# Patient Record
Sex: FEMALE | Race: WHITE | Employment: OTHER | URBAN - METROPOLITAN AREA
[De-identification: names, ages, dates, MRNs, and addresses within clinical notes are randomized per-mention and may not be internally consistent; named-entity substitution may affect disease eponyms.]

---

## 2017-04-29 ENCOUNTER — HOSPITAL ENCOUNTER (EMERGENCY)
Facility: CLINIC | Age: 82
Discharge: HOME | End: 2017-04-29
Attending: FAMILY MEDICINE | Admitting: FAMILY MEDICINE
Payer: MEDICARE

## 2017-04-29 VITALS
WEIGHT: 126 LBS | RESPIRATION RATE: 22 BRPM | HEART RATE: 81 BPM | TEMPERATURE: 98.2 F | SYSTOLIC BLOOD PRESSURE: 152 MMHG | OXYGEN SATURATION: 97 % | BODY MASS INDEX: 24.61 KG/M2 | DIASTOLIC BLOOD PRESSURE: 58 MMHG

## 2017-04-29 DIAGNOSIS — R00.2 PALPITATIONS: ICD-10-CM

## 2017-04-29 LAB
ALBUMIN SERPL-MCNC: 3.6 G/DL (ref 3.4–5)
ALP SERPL-CCNC: 113 U/L (ref 40–150)
ALT SERPL W P-5'-P-CCNC: 24 U/L (ref 0–50)
ANION GAP SERPL CALCULATED.3IONS-SCNC: 7 MMOL/L (ref 3–14)
AST SERPL W P-5'-P-CCNC: 22 U/L (ref 0–45)
BASOPHILS # BLD AUTO: 0 10E9/L (ref 0–0.2)
BASOPHILS NFR BLD AUTO: 0.3 %
BILIRUB SERPL-MCNC: 0.2 MG/DL (ref 0.2–1.3)
BUN SERPL-MCNC: 30 MG/DL (ref 7–30)
CALCIUM SERPL-MCNC: 9.2 MG/DL (ref 8.5–10.1)
CHLORIDE SERPL-SCNC: 96 MMOL/L (ref 94–109)
CO2 SERPL-SCNC: 36 MMOL/L (ref 20–32)
CREAT SERPL-MCNC: 0.88 MG/DL (ref 0.52–1.04)
DIFFERENTIAL METHOD BLD: ABNORMAL
EOSINOPHIL # BLD AUTO: 0.2 10E9/L (ref 0–0.7)
EOSINOPHIL NFR BLD AUTO: 2.3 %
ERYTHROCYTE [DISTWIDTH] IN BLOOD BY AUTOMATED COUNT: 13.9 % (ref 10–15)
GFR SERPL CREATININE-BSD FRML MDRD: 62 ML/MIN/1.7M2
GLUCOSE SERPL-MCNC: 93 MG/DL (ref 70–99)
HCT VFR BLD AUTO: 39.3 % (ref 35–47)
HGB BLD-MCNC: 11.6 G/DL (ref 11.7–15.7)
IMM GRANULOCYTES # BLD: 0 10E9/L (ref 0–0.4)
IMM GRANULOCYTES NFR BLD: 0.2 %
LYMPHOCYTES # BLD AUTO: 1.7 10E9/L (ref 0.8–5.3)
LYMPHOCYTES NFR BLD AUTO: 18.5 %
MCH RBC QN AUTO: 28.3 PG (ref 26.5–33)
MCHC RBC AUTO-ENTMCNC: 29.5 G/DL (ref 31.5–36.5)
MCV RBC AUTO: 96 FL (ref 78–100)
MONOCYTES # BLD AUTO: 0.9 10E9/L (ref 0–1.3)
MONOCYTES NFR BLD AUTO: 9.7 %
NEUTROPHILS # BLD AUTO: 6.5 10E9/L (ref 1.6–8.3)
NEUTROPHILS NFR BLD AUTO: 69 %
NT-PROBNP SERPL-MCNC: 973 PG/ML (ref 0–1800)
PLATELET # BLD AUTO: 263 10E9/L (ref 150–450)
POTASSIUM SERPL-SCNC: 3.8 MMOL/L (ref 3.4–5.3)
PROT SERPL-MCNC: 7.6 G/DL (ref 6.8–8.8)
RBC # BLD AUTO: 4.1 10E12/L (ref 3.8–5.2)
SODIUM SERPL-SCNC: 139 MMOL/L (ref 133–144)
TROPONIN I SERPL-MCNC: NORMAL UG/L (ref 0–0.04)
WBC # BLD AUTO: 9.4 10E9/L (ref 4–11)

## 2017-04-29 PROCEDURE — 84484 ASSAY OF TROPONIN QUANT: CPT | Performed by: FAMILY MEDICINE

## 2017-04-29 PROCEDURE — 80053 COMPREHEN METABOLIC PANEL: CPT | Performed by: FAMILY MEDICINE

## 2017-04-29 PROCEDURE — 85025 COMPLETE CBC W/AUTO DIFF WBC: CPT | Performed by: FAMILY MEDICINE

## 2017-04-29 PROCEDURE — 93005 ELECTROCARDIOGRAM TRACING: CPT

## 2017-04-29 PROCEDURE — 93010 ELECTROCARDIOGRAM REPORT: CPT | Mod: Z6 | Performed by: FAMILY MEDICINE

## 2017-04-29 PROCEDURE — 99284 EMERGENCY DEPT VISIT MOD MDM: CPT | Mod: 25 | Performed by: FAMILY MEDICINE

## 2017-04-29 PROCEDURE — 99284 EMERGENCY DEPT VISIT MOD MDM: CPT | Mod: 25

## 2017-04-29 PROCEDURE — A9270 NON-COVERED ITEM OR SERVICE: HCPCS | Mod: GY | Performed by: FAMILY MEDICINE

## 2017-04-29 PROCEDURE — 25000132 ZZH RX MED GY IP 250 OP 250 PS 637: Mod: GY | Performed by: FAMILY MEDICINE

## 2017-04-29 PROCEDURE — 83880 ASSAY OF NATRIURETIC PEPTIDE: CPT | Performed by: FAMILY MEDICINE

## 2017-04-29 RX ORDER — ATENOLOL 25 MG/1
50 TABLET ORAL ONCE
Status: COMPLETED | OUTPATIENT
Start: 2017-04-29 | End: 2017-04-29

## 2017-04-29 RX ORDER — LIDOCAINE 40 MG/G
CREAM TOPICAL
Status: DISCONTINUED | OUTPATIENT
Start: 2017-04-29 | End: 2017-04-29 | Stop reason: HOSPADM

## 2017-04-29 RX ORDER — AMLODIPINE BESYLATE 5 MG/1
5 TABLET ORAL ONCE
Status: COMPLETED | OUTPATIENT
Start: 2017-04-29 | End: 2017-04-29

## 2017-04-29 RX ADMIN — ATENOLOL 50 MG: 25 TABLET ORAL at 18:08

## 2017-04-29 RX ADMIN — AMLODIPINE BESYLATE 5 MG: 5 TABLET ORAL at 18:09

## 2017-04-29 NOTE — DISCHARGE INSTRUCTIONS
"  * Heart Palpitations    Palpitations refers to the feeling that your heart is beating hard, fast or irregular. Some people describe it as \"pounding\" or \"skipped beats\". Palpitations may occur in persons with heart disease, but can also occur in healthy persons. Your doctor does not believe that anything dangerous is causing your symptoms at this time.  Heart-Related Causes:    Arrhythmia (a change from the heart's normal rhythm)    Disease of the heart valves  Non-Heart-Related Causes:    Certain medicines (such as asthma inhalers and decongestants)    Some herbal supplements, energy drinks and pills, and weight loss pills    Illegal stimulant drugs (such as cocaine, crank, methamphetamine, PCP)    Caffeine, alcohol and tobacco    Medical conditions such as thyroid disease, anemia, anxiety and panic disorder  Sometimes the cause cannot be found.  Home Care:  1. Avoid excess caffeine, alcohol, tobacco and any stimulant drugs.  2. Tell your doctor about any prescription or over-the-counter or herbal medicines you take.  Follow Up  with your doctor or as advised by our staff.  Get Prompt Medical Attention  if any of the following occur together with palpitations:    Weakness, dizziness, light-headed or fainting    Chest pain or shortness of breath    Rapid heart rate (over 120 beats per minute, at rest)    Palpitations that lasts over 20 minutes    Weakness of an arm or leg or one side of the face    Difficulty with speech or vision    3419-8376 [employer]. [address] [place] [address]. All rights reserved. This information is not intended as a substitute for professional medical care. Always follow your healthcare professional's instructions.        "

## 2017-04-29 NOTE — ED NOTES
Pt states that she felt odd today.  Checked her bp which was good, but HR:  40 then checked on an oximeter and it was 80.  This is the first time this has ever happened to her.  Denies any chest pain, n/v/Sob.

## 2017-04-29 NOTE — ED AVS SNAPSHOT
" [facility] Emergency Department    [address] [place] [address]    Phone:  [phone]    Fax:  [phone]                                       Janice K Goodell   MRN: [id number]    Department:  [facility] Emergency Department   Date of Visit:  4/29/2017           Patient Information     Date Of Birth          1935        Your diagnoses for this visit were:     Palpitations        You were seen by Negro Barroso MD.      Follow-up Information     Follow up with Michael Henao Schedule an appointment as soon as possible for a visit in 3 days.    Specialty:  Family Practice    Why:  If not improving.    Contact information:    [facility]  [id number]   [place] [address]  [phone]          Discharge Instructions         * Heart Palpitations    Palpitations refers to the feeling that your heart is beating hard, fast or irregular. Some people describe it as \"pounding\" or \"skipped beats\". Palpitations may occur in persons with heart disease, but can also occur in healthy persons. Your doctor does not believe that anything dangerous is causing your symptoms at this time.  Heart-Related Causes:    Arrhythmia (a change from the heart's normal rhythm)    Disease of the heart valves  Non-Heart-Related Causes:    Certain medicines (such as asthma inhalers and decongestants)    Some herbal supplements, energy drinks and pills, and weight loss pills    Illegal stimulant drugs (such as cocaine, crank, methamphetamine, PCP)    Caffeine, alcohol and tobacco    Medical conditions such as thyroid disease, anemia, anxiety and panic disorder  Sometimes the cause cannot be found.  Home Care:  1. Avoid excess caffeine, alcohol, tobacco and any stimulant drugs.  2. Tell your doctor about any prescription or over-the-counter or herbal medicines you take.  Follow Up  with your doctor or as advised by our staff.  Get Prompt Medical Attention  if any of the following " occur together with palpitations:    Weakness, dizziness, light-headed or fainting    Chest pain or shortness of breath    Rapid heart rate (over 120 beats per minute, at rest)    Palpitations that lasts over 20 minutes    Weakness of an arm or leg or one side of the face    Difficulty with speech or vision    9211-0167 [employer]. [address] [place] [address]. All rights reserved. This information is not intended as a substitute for professional medical care. Always follow your healthcare professional's instructions.          24 Hour Appointment Hotline       To make an appointment at any [facility], call [phone] ([phone]). If you don't have a family doctor or clinic, we will help you find one. [facility] clinics are conveniently located to serve the needs of you and your family.             Review of your medicines      Our records show that you are taking the medicines listed below. If these are incorrect, please call your family doctor or clinic.        Dose / Directions Last dose taken    ADVAIR DISKUS 250-50 MCG/DOSE diskus inhaler   Generic drug:  fluticasone-salmeterol        bid   Refills:  0        ALBUTEROL IN        as needed   Refills:  0        aspirin 81 MG chewable tablet   Dose:  81 mg        Take 81 mg by mouth daily.   Refills:  0        atenolol 50 MG tablet   Commonly known as:  TENORMIN        1 TABLET DAILY   Refills:  0        BACTROBAN 2 % ointment   Generic drug:  mupirocin        in nose prn   Refills:  0        cetirizine 10 MG tablet   Commonly known as:  zyrTEC   Dose:  10 mg   Quantity:  20 tablet        Take 1 tablet by mouth 2 times daily as needed (itch, swelling of lips).   Refills:  0        COLACE PO        Take  by mouth.   Refills:  0        ferrous sulfate 325 (65 FE) MG tablet   Commonly known as:  IRON        Take  by mouth daily (with breakfast).   Refills:  0        FLONASE 50 MCG/ACT spray   Generic drug:  fluticasone        2  "puffs daily   Refills:  0        INCRUSE ELLIPTA 62.5 MCG/INH oral inhaler   Dose:  1 puff   Generic drug:  umeclidinium        Inhale 1 puff into the lungs daily   Refills:  0        LASIX 20 MG tablet   Generic drug:  furosemide        2x/day   Refills:  0        MULTIVITAMIN TABS   OR        1 tablet daily   Refills:  0        NORVASC 5 MG tablet   Generic drug:  amLODIPine        2x daily   Refills:  0        POTASSIUM CHLORIDE        Refills:  0        PRESERVISION/LUTEIN Caps        Take  by mouth.   Refills:  0        PRILOSEC PO   Dose:  20 mg        Take 20 mg by mouth.   Refills:  0        TYLENOL 325 MG tablet   Generic drug:  acetaminophen        as needed   Refills:  0                Procedures and tests performed during your visit     Procedure/Test Number of Times Performed    CBC with platelets differential 1    Cardiac Continuous Monitoring 1    Comprehensive metabolic panel 1    EKG 12-lead, tracing only 2    Nt probnp inpatient (BNP) 1    Orthostatic blood pressure and pulse 1    Peripheral IV catheter 1    Troponin I 1      Orders Needing Specimen Collection     None      Pending Results     No orders found from 4/27/2017 to 4/30/2017.            Pending Culture Results     No orders found from 4/27/2017 to 4/30/2017.            Thank you for choosing [facility]       Thank you for choosing [facility] for your care. Our goal is always to provide you with excellent care. Hearing back from our patients is one way we can continue to improve our services. Please take a few minutes to complete the written survey that you may receive in the mail after you visit with us. Thank you!        [employer] Information     [employer] lets you send messages to your doctor, view your test results, renew your prescriptions, schedule appointments and more. To sign up, go to [email or web address] . Click on \"Log in\" on the left side of the screen, which will take you to the Welcome page. Then click on \"Sign up Now\" on the " right side of the page.     You will be asked to enter the access code listed below, as well as some personal information. Please follow the directions to create your username and password.     Your access code is: [email or web address]  Expires: 2017  7:01 PM     Your access code will  in 90 days. If you need help or a new code, please call your [facility] clinic or [phone].        Care EveryWhere ID     This is your Care EveryWhere ID. This could be used by other organizations to access your [facility] medical records  [id number]        After Visit Summary       This is your record. Keep this with you and show to your community pharmacist(s) and doctor(s) at your next visit.

## 2017-04-29 NOTE — ED PROVIDER NOTES
History     Chief Complaint   Patient presents with     Irregular Heart Beat     HPI  Janice K Goodell is a 81 year old female who presents with concerns of an irregular heartbeat.  Patient states that she was feeling off this afternoon.  She has high blood pressure so she went to check her blood pressure when she noticed that her pulse was erratic.  It was jumping between the 80s and then down into the 40s.  Patient denied in the chest pain, lightheadedness or dizziness.  Patient does have long-standing COPD which she is on oxygen for but denies any worsening shortness of breath currently.  She denies any nausea vomiting or diarrhea.  She denies any back pain or belly pain.  She states she does drink a lot of coffee daily but is not drunk anymore than she normally does.  She states she has been eating and drinking normally.    I have reviewed the Medications, Allergies, Past Medical and Surgical History, and Social History in the Epic system.    Review of Systems   All other systems reviewed and are negative.      Physical Exam   BP: (!) 188/92  Pulse: 81  Heart Rate: 46  Temp: 98  F (36.7  C)  Resp: 18  Weight: 57.2 kg (126 lb 3.2 oz)  SpO2: 96 %  Physical Exam   Constitutional: She appears well-developed and well-nourished. No distress.   HENT:   Head: Normocephalic and atraumatic.   Mouth/Throat: Oropharynx is clear and moist. No oropharyngeal exudate.   Eyes: Conjunctivae are normal.   Neck: Normal range of motion. Neck supple.   Cardiovascular: Normal rate, regular rhythm, normal heart sounds and intact distal pulses.  Exam reveals no gallop and no friction rub.    No murmur heard.  Pulmonary/Chest: Effort normal and breath sounds normal. No respiratory distress. She has no wheezes. She has no rales.   Abdominal: Soft. Bowel sounds are normal. She exhibits no distension. There is no tenderness. There is no rebound and no guarding.   Skin: She is not diaphoretic.   Nursing note and vitals reviewed.      ED  Course     ED Course     Procedures              EKG Interpretation:      Interpreted by Negro Barroso  Time reviewed: now   Symptoms at time of EKG: now   Rhythm: normal sinus   Rate: normal  Axis: NORMAL  Ectopy: some PACs noted  Conduction: normal  ST Segments/ T Waves: No ST-T wave changes  Q Waves: none  Comparison to prior: No old EKG available    Clinical Impression: normal EKG with PACs    Results for orders placed or performed during the hospital encounter of 04/29/17   CBC with platelets differential   Result Value Ref Range    WBC 9.4 4.0 - 11.0 10e9/L    RBC Count 4.10 3.8 - 5.2 10e12/L    Hemoglobin 11.6 (L) 11.7 - 15.7 g/dL    Hematocrit 39.3 35.0 - 47.0 %    MCV 96 78 - 100 fl    MCH 28.3 26.5 - 33.0 pg    MCHC 29.5 (L) 31.5 - 36.5 g/dL    RDW 13.9 10.0 - 15.0 %    Platelet Count 263 150 - 450 10e9/L    Diff Method Automated Method     % Neutrophils 69.0 %    % Lymphocytes 18.5 %    % Monocytes 9.7 %    % Eosinophils 2.3 %    % Basophils 0.3 %    % Immature Granulocytes 0.2 %    Absolute Neutrophil 6.5 1.6 - 8.3 10e9/L    Absolute Lymphocytes 1.7 0.8 - 5.3 10e9/L    Absolute Monocytes 0.9 0.0 - 1.3 10e9/L    Absolute Eosinophils 0.2 0.0 - 0.7 10e9/L    Absolute Basophils 0.0 0.0 - 0.2 10e9/L    Abs Immature Granulocytes 0.0 0 - 0.4 10e9/L   Comprehensive metabolic panel   Result Value Ref Range    Sodium 139 133 - 144 mmol/L    Potassium 3.8 3.4 - 5.3 mmol/L    Chloride 96 94 - 109 mmol/L    Carbon Dioxide 36 (H) 20 - 32 mmol/L    Anion Gap 7 3 - 14 mmol/L    Glucose 93 70 - 99 mg/dL    Urea Nitrogen 30 7 - 30 mg/dL    Creatinine 0.88 0.52 - 1.04 mg/dL    GFR Estimate 62 >60 mL/min/1.7m2    GFR Estimate If Black 75 >60 mL/min/1.7m2    Calcium 9.2 8.5 - 10.1 mg/dL    Bilirubin Total 0.2 0.2 - 1.3 mg/dL    Albumin 3.6 3.4 - 5.0 g/dL    Protein Total 7.6 6.8 - 8.8 g/dL    Alkaline Phosphatase 113 40 - 150 U/L    ALT 24 0 - 50 U/L    AST 22 0 - 45 U/L   Troponin I   Result Value Ref Range     Troponin I ES  0.000 - 0.045 ug/L     <0.015  The 99th percentile for upper reference range is 0.045 ug/L.  Troponin values in   the range of 0.045 - 0.120 ug/L may be associated with risks of adverse   clinical events.     Nt probnp inpatient (BNP)   Result Value Ref Range    N-Terminal Pro BNP Inpatient 973 0 - 1800 pg/mL     Medications   amLODIPine (NORVASC) tablet 5 mg (5 mg Oral Given 4/29/17 1809)   atenolol (TENORMIN) tablet 50 mg (50 mg Oral Given 4/29/17 1808)     labs are reviewed and more remarkable.  Patient was monitored on the cardiac monitor for more than an 2 hours and had no signs of significant arrhythmias.  I think some of her symptoms could be related to her PACs this doesn't appear to be any significant cardiac event at this point.  I recommend that she cut back on her caffeine use to see if this makes a difference.  I will have her follow-up with her doctor in the next few days for further evaluation if needed.    Assessments & Plan (with Medical Decision Making)  palpitations      I have reviewed the nursing notes.    I have reviewed the findings, diagnosis, plan and need for follow up with the patient.    Discharge Medication List as of 4/29/2017  7:01 PM          Final diagnoses:   Palpitations       4/29/2017   [facility] EMERGENCY DEPARTMENT     Negro Barroso MD  04/30/17 5430

## 2017-04-29 NOTE — ED AVS SNAPSHOT
[facility] Emergency Department    [address] [place] [address]    Phone:  [phone]    Fax:  [phone]                                       Janice K Goodell   MRN: [id number]    Department:  [facility] Emergency Department   Date of Visit:  4/29/2017           After Visit Summary Signature Page     I have received my discharge instructions, and my questions have been answered. I have discussed any challenges I see with this plan with the nurse or doctor.    ..........................................................................................................................................  Patient/Patient Representative Signature      ..........................................................................................................................................  Patient Representative Print Name and Relationship to Patient    ..................................................               ................................................  Date                                            Time    ..........................................................................................................................................  Reviewed by Signature/Title    ...................................................              ..............................................  Date                                                            Time

## 2017-05-16 ENCOUNTER — HOSPITAL ENCOUNTER (OUTPATIENT)
Dept: CT IMAGING | Facility: CLINIC | Age: 82
Discharge: HOME | End: 2017-05-16
Attending: FAMILY MEDICINE | Admitting: FAMILY MEDICINE
Payer: MEDICARE

## 2017-05-16 DIAGNOSIS — Z85.118 PERSONAL HISTORY OF MALIGNANT NEOPLASM OF BRONCHUS AND LUNG: ICD-10-CM

## 2017-05-16 PROCEDURE — 71260 CT THORAX DX C+: CPT

## 2017-05-16 PROCEDURE — 25000125 ZZHC RX 250: Performed by: RADIOLOGY

## 2017-05-16 PROCEDURE — 25000128 H RX IP 250 OP 636: Performed by: RADIOLOGY

## 2017-05-16 RX ORDER — IOPAMIDOL 755 MG/ML
500 INJECTION, SOLUTION INTRAVASCULAR ONCE
Status: COMPLETED | OUTPATIENT
Start: 2017-05-16 | End: 2017-05-16

## 2017-05-16 RX ADMIN — SODIUM CHLORIDE 75 ML: 9 INJECTION, SOLUTION INTRAVENOUS at 11:33

## 2017-05-16 RX ADMIN — IOPAMIDOL 75 ML: 755 INJECTION, SOLUTION INTRAVENOUS at 11:33

## 2017-11-08 ENCOUNTER — HOSPITAL ENCOUNTER (OUTPATIENT)
Dept: CT IMAGING | Facility: CLINIC | Age: 82
Discharge: HOME | End: 2017-11-08
Attending: FAMILY MEDICINE | Admitting: FAMILY MEDICINE
Payer: MEDICARE

## 2017-11-08 DIAGNOSIS — R91.1 LUNG NODULE: ICD-10-CM

## 2017-11-08 PROCEDURE — 71250 CT THORAX DX C-: CPT

## 2018-05-15 ENCOUNTER — HOSPITAL ENCOUNTER (OUTPATIENT)
Dept: CT IMAGING | Facility: CLINIC | Age: 83
Discharge: HOME | End: 2018-05-15
Attending: FAMILY MEDICINE | Admitting: FAMILY MEDICINE
Payer: MEDICARE

## 2018-05-15 DIAGNOSIS — R91.1 LUNG NODULE: ICD-10-CM

## 2018-05-15 PROCEDURE — 71250 CT THORAX DX C-: CPT

## 2018-08-25 ENCOUNTER — HOSPITAL ENCOUNTER (EMERGENCY)
Facility: CLINIC | Age: 83
Discharge: HOME | End: 2018-08-26
Attending: PHYSICIAN ASSISTANT | Admitting: PHYSICIAN ASSISTANT
Payer: MEDICARE

## 2018-08-25 DIAGNOSIS — I48.91 ATRIAL FIBRILLATION WITH RAPID VENTRICULAR RESPONSE (H): ICD-10-CM

## 2018-08-25 LAB
BASOPHILS # BLD AUTO: 0 10E9/L (ref 0–0.2)
BASOPHILS NFR BLD AUTO: 0.4 %
DIFFERENTIAL METHOD BLD: ABNORMAL
EOSINOPHIL NFR BLD AUTO: 3 %
ERYTHROCYTE [DISTWIDTH] IN BLOOD BY AUTOMATED COUNT: 13.4 % (ref 10–15)
HCT VFR BLD AUTO: 32.5 % (ref 35–47)
HGB BLD-MCNC: 10.2 G/DL (ref 11.7–15.7)
IMM GRANULOCYTES # BLD: 0 10E9/L (ref 0–0.4)
IMM GRANULOCYTES NFR BLD: 0.3 %
LYMPHOCYTES # BLD AUTO: 1.6 10E9/L (ref 0.8–5.3)
LYMPHOCYTES NFR BLD AUTO: 14.6 %
MCH RBC QN AUTO: 29 PG (ref 26.5–33)
MCHC RBC AUTO-ENTMCNC: 31.4 G/DL (ref 31.5–36.5)
MCV RBC AUTO: 92 FL (ref 78–100)
MONOCYTES # BLD AUTO: 1 10E9/L (ref 0–1.3)
MONOCYTES NFR BLD AUTO: 8.8 %
NEUTROPHILS # BLD AUTO: 7.9 10E9/L (ref 1.6–8.3)
NEUTROPHILS NFR BLD AUTO: 72.9 %
NRBC # BLD AUTO: 0 10*3/UL
NRBC BLD AUTO-RTO: 0 /100
PLATELET # BLD AUTO: 249 10E9/L (ref 150–450)
RBC # BLD AUTO: 3.52 10E12/L (ref 3.8–5.2)
WBC # BLD AUTO: 10.8 10E9/L (ref 4–11)

## 2018-08-25 PROCEDURE — 99285 EMERGENCY DEPT VISIT HI MDM: CPT | Mod: 25 | Performed by: PHYSICIAN ASSISTANT

## 2018-08-25 PROCEDURE — 25000128 H RX IP 250 OP 636: Performed by: PHYSICIAN ASSISTANT

## 2018-08-25 PROCEDURE — 93005 ELECTROCARDIOGRAM TRACING: CPT | Performed by: PHYSICIAN ASSISTANT

## 2018-08-25 PROCEDURE — 84484 ASSAY OF TROPONIN QUANT: CPT | Performed by: PHYSICIAN ASSISTANT

## 2018-08-25 PROCEDURE — 93010 ELECTROCARDIOGRAM REPORT: CPT | Mod: Z6 | Performed by: PHYSICIAN ASSISTANT

## 2018-08-25 PROCEDURE — 83880 ASSAY OF NATRIURETIC PEPTIDE: CPT | Performed by: PHYSICIAN ASSISTANT

## 2018-08-25 PROCEDURE — 96361 HYDRATE IV INFUSION ADD-ON: CPT | Performed by: PHYSICIAN ASSISTANT

## 2018-08-25 PROCEDURE — 80053 COMPREHEN METABOLIC PANEL: CPT | Performed by: PHYSICIAN ASSISTANT

## 2018-08-25 PROCEDURE — 85025 COMPLETE CBC W/AUTO DIFF WBC: CPT | Performed by: PHYSICIAN ASSISTANT

## 2018-08-25 RX ORDER — POTASSIUM CHLORIDE 750 MG/1
10 TABLET, EXTENDED RELEASE ORAL 2 TIMES DAILY WITH MEALS
COMMUNITY
Start: 2017-10-17

## 2018-08-25 RX ORDER — ASPIRIN 81 MG/1
81 TABLET ORAL DAILY
COMMUNITY
Start: 2012-03-21 | End: 2018-08-25

## 2018-08-25 RX ORDER — DESONIDE 0.5 MG/G
CREAM TOPICAL PRN
Status: ON HOLD | COMMUNITY
Start: 2017-10-17 | End: 2019-06-26

## 2018-08-25 RX ORDER — DILTIAZEM HYDROCHLORIDE 5 MG/ML
20 INJECTION INTRAVENOUS ONCE
Status: COMPLETED | OUTPATIENT
Start: 2018-08-25 | End: 2018-08-26

## 2018-08-25 RX ORDER — FLUTICASONE PROPIONATE 50 MCG
1 SPRAY, SUSPENSION (ML) NASAL DAILY
COMMUNITY
Start: 2013-11-29

## 2018-08-25 RX ORDER — ATENOLOL 50 MG/1
50 TABLET ORAL 2 TIMES DAILY
COMMUNITY
Start: 2018-05-08 | End: 2018-09-05 | Stop reason: ALTCHOICE

## 2018-08-25 RX ORDER — FUROSEMIDE 40 MG
40 TABLET ORAL DAILY
Status: ON HOLD | COMMUNITY
Start: 2018-07-18 | End: 2019-06-26

## 2018-08-25 RX ORDER — MULTIVITAMIN
1 TABLET ORAL DAILY
COMMUNITY

## 2018-08-25 RX ORDER — TRIAMCINOLONE ACETONIDE 0.1 %
PASTE (GRAM) DENTAL 2 TIMES DAILY PRN
Status: ON HOLD | COMMUNITY
Start: 2017-10-17 | End: 2019-06-26

## 2018-08-25 RX ORDER — AMLODIPINE BESYLATE 5 MG/1
5 TABLET ORAL 2 TIMES DAILY
COMMUNITY
Start: 2018-05-08 | End: 2019-06-24

## 2018-08-25 RX ORDER — TIOTROPIUM BROMIDE 18 UG/1
18 CAPSULE ORAL; RESPIRATORY (INHALATION) DAILY
COMMUNITY
Start: 2018-07-02

## 2018-08-25 RX ORDER — LISINOPRIL 10 MG/1
20 TABLET ORAL 2 TIMES DAILY
COMMUNITY
Start: 2017-12-13

## 2018-08-25 RX ORDER — FERROUS GLUCONATE 324(38)MG
324 TABLET ORAL DAILY
COMMUNITY
Start: 2013-05-30 | End: 2019-06-24

## 2018-08-25 RX ORDER — DOCUSATE SODIUM 100 MG/1
100 CAPSULE, LIQUID FILLED ORAL DAILY
Status: ON HOLD | COMMUNITY
Start: 2012-03-30 | End: 2019-06-26

## 2018-08-25 RX ORDER — ALBUTEROL SULFATE 90 UG/1
2 AEROSOL, METERED RESPIRATORY (INHALATION) 4 TIMES DAILY PRN
COMMUNITY
Start: 2018-07-02

## 2018-08-25 RX ADMIN — SODIUM CHLORIDE 1000 ML: 9 INJECTION, SOLUTION INTRAVENOUS at 23:53

## 2018-08-25 NOTE — ED AVS SNAPSHOT
[facility] Emergency Department    [address] [place] [address]    Phone:  [phone]    Fax:  [phone]                                       Janice K Goodell   MRN: [id number]    Department:  [facility] Emergency Department   Date of Visit:  8/25/2018           After Visit Summary Signature Page     I have received my discharge instructions, and my questions have been answered. I have discussed any challenges I see with this plan with the nurse or doctor.    ..........................................................................................................................................  Patient/Patient Representative Signature      ..........................................................................................................................................  Patient Representative Print Name and Relationship to Patient    ..................................................               ................................................  Date                                            Time    ..........................................................................................................................................  Reviewed by Signature/Title    ...................................................              ..............................................  Date                                                            Time          22EPIC Rev 08/18

## 2018-08-25 NOTE — ED AVS SNAPSHOT
[facility] Emergency Department    [address]     [place] [address]    Phone:  [phone]    Fax:  [phone]                                       Janice K Goodell   MRN: [id number]    Department:  [facility] Emergency Department   Date of Visit:  8/25/2018           Patient Information     Date Of Birth          1935        Your diagnoses for this visit were:     Atrial fibrillation with rapid ventricular response (H)        You were seen by Dayna Marie PA-C.      Follow-up Information     Follow up with [facility] Emergency Department.    Specialty:  EMERGENCY MEDICINE    Why:  If symptoms worsen    Contact information:    [address] [facility]   [place] [address] [phone]    Additional information:    From [address]: Exit at [address] on south side of [place]. Turn right on [address]. Turn left at stoplight on [address]. [facility] will be in view two blocks ahead        Follow up with Michael Henao In 3 days.    Specialty:  Family Practice    Why:  For ER follow up    Contact information:    [facility]  [address] [facility] [address]  [phone]          Discharge Instructions       Fortunately it looks like your heart converted back into a normal rhythm. Continue taking your atenolol twice daily as prescribed.  This will help control your rate going forward.  We did not start any anticoagulation medication due to the bleeding risk after your liver biopsy.    Please talk to your regular doctor on Tuesday at your follow-up appointment about this ER visit.  You may benefit from wearing a heart monitor to evaluate rhythm further.  You may require anticoagulation in the future but this can be discussed at the follow-up appointment either with him or with cardiology.    In the meantime, if you develop any recurrence of or worsening symptoms, please do not hesitate to return to the emergency department  right away.    Thank you for choosing [facility] Emergency Department. It was a pleasure taking care of you today. If you have any questions, please call [phone].    Dayna Marie PA-C          What Is Atrial Flutter/Atrial Fibrillation?      The heart has its own electrical system. This system makes the signals that start each heartbeat. The heartbeat begins in 1 of the 2 upper chambers of the heart (atria). A problem can make the atria beat faster than normal. The atria may beat fast but still evenly. This problem is called atrial flutter. If the atria beat very fast and also unevenly, it is called atrial fibrillation (AFib).  Causes of Atrial Flutter and Atrial Fibrillation  Causes of these problems can include:    Previous heart attack    High blood pressure    Thyroid problems  In many cases, the cause is unknown.  When the Atria Beat Too Fast  The atria may beat fast only once in a while. This is called a paroxysmal heart rhythm problem. If they beat fast all the time, it is a chronic problem.  Atrial Flutter  With atrial flutter, electrical signals travel around and around inside the atria. These circling signals make the atria beat too fast:    Atrial flutter can cause symptoms similar to AFib. It can also lead to the even faster, uneven rhythms of AFib.  Atrial Fibrillation (AFib)  With AFib, cells in the atria send extra electrical signals. These extra signals make the atria beat very fast. They also beat unevenly:    The atria beat so fast and unevenly that they may quiver instead of norma. If the atria don t contract, they don t move enough blood into the 2 lower chambers of the heart (ventricles). This can cause you to feel dizzy or weak.    Blood that doesn t keep moving can pool and form clots in the atria. These clots can move into other parts of the body and cause serious problems such as a stroke.   Symptoms of Atrial Flutter and AFib  These symptoms include the  following:    Palpitations (a fluttering, fast heartbeat)    Weakness or tiredness    Shortness of breath    Chest pain or tightness    Dizziness or lightheadedness    Fainting spells           24 Hour Appointment Hotline       To make an appointment at any [facility] clinic, call [phone]). If you don't have a family doctor or clinic, we will help you find one. [facility] clinics are conveniently located to serve the needs of you and your family.             Review of your medicines      Our records show that you are taking the medicines listed below. If these are incorrect, please call your family doctor or clinic.        Dose / Directions Last dose taken    ADVAIR DISKUS 500-50 MCG/DOSE diskus inhaler   Dose:  1 puff   Generic drug:  fluticasone-salmeterol        Inhale 1 puff into the lungs 2 times daily   Refills:  0        albuterol 108 (90 Base) MCG/ACT inhaler   Commonly known as:  PROAIR HFA/PROVENTIL HFA/VENTOLIN HFA   Dose:  2 puff        Inhale 2 puffs into the lungs 4 times daily as needed   Refills:  0        amLODIPine 5 MG tablet   Commonly known as:  NORVASC   Dose:  5 mg        Take 5 mg by mouth 2 times daily   Refills:  0        aspirin 81 MG chewable tablet   Dose:  81 mg        Take 81 mg by mouth daily.   Refills:  0        atenolol 50 MG tablet   Commonly known as:  TENORMIN   Dose:  50 mg        Take 50 mg by mouth 2 times daily   Refills:  0        BACTROBAN 2 % ointment   Generic drug:  mupirocin        in nose prn   Refills:  0        cetirizine 10 MG tablet   Commonly known as:  zyrTEC   Dose:  10 mg   Quantity:  20 tablet        Take 1 tablet by mouth 2 times daily as needed (itch, swelling of lips).   Refills:  0        desonide 0.05 % cream   Commonly known as:  DESOWEN        Apply topically as needed   Refills:  0        ferrous gluconate 324 (38 Fe) MG tablet   Commonly known as:  FERGON   Dose:  324 mg        Take 324 mg by mouth daily   Refills:  0         fluticasone 50 MCG/ACT spray   Commonly known as:  FLONASE   Dose:  1 spray        Spray 1 spray in nostril daily   Refills:  0        furosemide 40 MG tablet   Commonly known as:  LASIX   Dose:  40 mg        Take 40 mg by mouth daily   Refills:  0        lisinopril 10 MG tablet   Commonly known as:  PRINIVIL/ZESTRIL   Dose:  10 mg        Take 10 mg by mouth 2 times daily   Refills:  0        MULTIPLE VITAMIN ESSENTIAL Tabs   Dose:  1 tablet        Take 1 tablet by mouth daily   Refills:  0        OCUVITE PRESERVISION PO   Dose:  1 tablet        Take 1 tablet by mouth 2 times daily   Refills:  0        omeprazole 20 MG CR capsule   Commonly known as:  priLOSEC   Dose:  20 mg        Take 20 mg by mouth 2 times daily   Refills:  0        potassium chloride SA 10 MEQ CR tablet   Commonly known as:  K-DUR/KLOR-CON M   Dose:  10 mEq        Take 10 mEq by mouth 2 times daily (with meals)   Refills:  0        STOOL SOFTENER 100 MG capsule   Dose:  100 mg   Generic drug:  docusate sodium        Take 100 mg by mouth daily   Refills:  0        tiotropium 18 MCG capsule   Commonly known as:  SPIRIVA   Dose:  18 mcg        Inhale 18 mcg into the lungs daily   Refills:  0        triamcinolone 0.1 % paste   Commonly known as:  KENALOG        2 times daily as needed   Refills:  0                Procedures and tests performed during your visit     Procedure/Test Number of Times Performed    CBC with platelets differential 1    Cardiac Continuous Monitoring 1    Comprehensive metabolic panel 1    EKG 12-lead, tracing only 2    Nt probnp inpatient (BNP) 1    Peripheral IV catheter 1    Troponin I 1      Orders Needing Specimen Collection     None      Pending Results     No orders found for last 3 day(s).            Pending Culture Results     No orders found for last 3 day(s).            Pending Results Instructions     If you had any lab results that were not finalized at the time of your Discharge, you can call the ED Lab Result  "RN at [phone]. You will be contacted by this team for any positive Lab results or changes in treatment. The nurses are available 7 days a week from 10A to 6:30P.  You can leave a message 24 hours per day and they will return your call.        Thank you for choosing [facility]       Thank you for choosing [facility] for your care. Our goal is always to provide you with excellent care. Hearing back from our patients is one way we can continue to improve our services. Please take a few minutes to complete the written survey that you may receive in the mail after you visit with us. Thank you!        [employer] Information     [employer] lets you send messages to your doctor, view your test results, renew your prescriptions, schedule appointments and more. To sign up, go to [email or web address] . Click on \"Log in\" on the left side of the screen, which will take you to the Welcome page. Then click on \"Sign up Now\" on the right side of the page.     You will be asked to enter the access code listed below, as well as some personal information. Please follow the directions to create your username and password.     Your access code is: [email or web address]  Expires: 2018 12:56 AM     Your access code will  in 90 days. If you need help or a new code, please call your [facility] clinic or [phone].        Care EveryWhere ID     This is your Care EveryWhere ID. This could be used by other organizations to access your [facility] medical records  [id number]        Equal Access to Services     VARINDER VARGHESE : Hadii jhonathan croucho Sogaetano, waaxda luqadaha, qaybta kaalmada charito salcedo . So [facility] [phone].    ATENCIÓN: Si habla español, tiene a roldan disposición servicios gratuitos de asistencia lingüística. Llame al [phone].    We comply with applicable federal civil rights laws and [place] laws. We do not discriminate on the basis of race, color, national origin, age, disability, " sex, sexual orientation, or gender identity.            After Visit Summary       This is your record. Keep this with you and show to your community pharmacist(s) and doctor(s) at your next visit.

## 2018-08-26 VITALS
TEMPERATURE: 98 F | SYSTOLIC BLOOD PRESSURE: 134 MMHG | DIASTOLIC BLOOD PRESSURE: 60 MMHG | RESPIRATION RATE: 22 BRPM | OXYGEN SATURATION: 98 % | HEART RATE: 132 BPM

## 2018-08-26 LAB
ALBUMIN SERPL-MCNC: 3.2 G/DL (ref 3.4–5)
ALP SERPL-CCNC: 111 U/L (ref 40–150)
ALT SERPL W P-5'-P-CCNC: 29 U/L (ref 0–50)
ANION GAP SERPL CALCULATED.3IONS-SCNC: 7 MMOL/L (ref 3–14)
AST SERPL W P-5'-P-CCNC: 29 U/L (ref 0–45)
BILIRUB SERPL-MCNC: 0.2 MG/DL (ref 0.2–1.3)
BUN SERPL-MCNC: 22 MG/DL (ref 7–30)
CALCIUM SERPL-MCNC: 8.5 MG/DL (ref 8.5–10.1)
CHLORIDE SERPL-SCNC: 97 MMOL/L (ref 94–109)
CO2 SERPL-SCNC: 32 MMOL/L (ref 20–32)
CREAT SERPL-MCNC: 1.07 MG/DL (ref 0.52–1.04)
GFR SERPL CREATININE-BSD FRML MDRD: 49 ML/MIN/1.7M2
GLUCOSE SERPL-MCNC: 134 MG/DL (ref 70–99)
NT-PROBNP SERPL-MCNC: 1998 PG/ML (ref 0–1800)
POTASSIUM SERPL-SCNC: 3.6 MMOL/L (ref 3.4–5.3)
PROT SERPL-MCNC: 7.1 G/DL (ref 6.8–8.8)
SODIUM SERPL-SCNC: 136 MMOL/L (ref 133–144)
TROPONIN I SERPL-MCNC: <0.015 UG/L (ref 0–0.04)

## 2018-08-26 PROCEDURE — 96374 THER/PROPH/DIAG INJ IV PUSH: CPT | Performed by: PHYSICIAN ASSISTANT

## 2018-08-26 PROCEDURE — 93010 ELECTROCARDIOGRAM REPORT: CPT | Mod: 76 | Performed by: PHYSICIAN ASSISTANT

## 2018-08-26 PROCEDURE — 25000125 ZZHC RX 250: Performed by: PHYSICIAN ASSISTANT

## 2018-08-26 RX ADMIN — DILTIAZEM HYDROCHLORIDE 20 MG: 5 INJECTION INTRAVENOUS at 00:07

## 2018-08-26 NOTE — DISCHARGE INSTRUCTIONS
Fortunately it looks like your heart converted back into a normal rhythm. Continue taking your atenolol twice daily as prescribed.  This will help control your rate going forward.  We did not start any anticoagulation medication due to the bleeding risk after your liver biopsy.    Please talk to your regular doctor on Tuesday at your follow-up appointment about this ER visit.  You may benefit from wearing a heart monitor to evaluate rhythm further.  You may require anticoagulation in the future but this can be discussed at the follow-up appointment either with him or with cardiology.    In the meantime, if you develop any recurrence of or worsening symptoms, please do not hesitate to return to the emergency department right away.    Thank you for choosing [facility] Emergency Department. It was a pleasure taking care of you today. If you have any questions, please call [phone].    Dayna Marie PA-C          What Is Atrial Flutter/Atrial Fibrillation?      The heart has its own electrical system. This system makes the signals that start each heartbeat. The heartbeat begins in 1 of the 2 upper chambers of the heart (atria). A problem can make the atria beat faster than normal. The atria may beat fast but still evenly. This problem is called atrial flutter. If the atria beat very fast and also unevenly, it is called atrial fibrillation (AFib).  Causes of Atrial Flutter and Atrial Fibrillation  Causes of these problems can include:    Previous heart attack    High blood pressure    Thyroid problems  In many cases, the cause is unknown.  When the Atria Beat Too Fast  The atria may beat fast only once in a while. This is called a paroxysmal heart rhythm problem. If they beat fast all the time, it is a chronic problem.  Atrial Flutter  With atrial flutter, electrical signals travel around and around inside the atria. These circling signals make the atria beat too fast:    Atrial flutter can cause  symptoms similar to AFib. It can also lead to the even faster, uneven rhythms of AFib.  Atrial Fibrillation (AFib)  With AFib, cells in the atria send extra electrical signals. These extra signals make the atria beat very fast. They also beat unevenly:    The atria beat so fast and unevenly that they may quiver instead of norma. If the atria don t contract, they don t move enough blood into the 2 lower chambers of the heart (ventricles). This can cause you to feel dizzy or weak.    Blood that doesn t keep moving can pool and form clots in the atria. These clots can move into other parts of the body and cause serious problems such as a stroke.   Symptoms of Atrial Flutter and AFib  These symptoms include the following:    Palpitations (a fluttering, fast heartbeat)    Weakness or tiredness    Shortness of breath    Chest pain or tightness    Dizziness or lightheadedness    Fainting spells

## 2018-08-26 NOTE — ED TRIAGE NOTES
Pt with multiple issues. Had a recent liver BX. BP has been up and down. Heart rate has been going up. Feels tired. Had a recent brain MRI. Was told recently her cancer is back. Lack of energy. Chronic pain in L side.

## 2018-08-26 NOTE — ED PROVIDER NOTES
History     Chief Complaint   Patient presents with     Generalized Weakness     HPI  Janice K Goodell is a 82 year old female who presents to the emergency department with palpitations and weakness the patient reports that yesterday she was feeling somewhat weaker than normal.  Today she was feeling very weak and tired as well.  This afternoon she developed a pounding in her lower chest that she has never experienced before and has been even more weak, lightheaded, and fatigued since then.  She notes some fluttering in her chest still and feels like her shortness of breath is worse than usual.  She is on oxygen for COPD at 3 L/min.  Her friend who is a nurse came over and they were checking her blood pressure which was fluctuating from the 140s systolic down to the 80s systolic and her heart rate was in the 100-140s range.  Her friend said that she probably should get checked out due to her abnormal vital signs.  Patient reports she saw a cardiologist several months ago and had stress testing completed which was normal.  Patient denies any chest pain with these palpitations.  She denies any abdominal pain, nausea or vomiting.  Denies urinary symptoms.  She notes that she recently found out she had lung cancer again and had a liver biopsy done in the last week as well.        Problem List:    Patient Active Problem List    Diagnosis Date Noted     community acquired pneumonia 09/22/2012     Priority: High     Anemia 09/23/2012     Priority: Medium     COPD (chronic obstructive pulmonary disease) (H) 09/22/2012     Priority: Medium     CKD (chronic kidney disease) stage 3, GFR 30-59 ml/min 09/22/2012     Priority: Medium     Hypokalemia 09/22/2012     Priority: Medium     HTN (hypertension) 09/22/2012     Priority: Medium     Personal history of Lung cancer - right lobectomy 2006, chemo 2007 09/22/2012     Priority: Low        Past Medical History:    Past Medical History:   Diagnosis Date     COPD (chronic  obstructive pulmonary disease) (H)      Heart valve disorder      Hypertension      Macular degeneration      Malignant neoplasm (H) 2006     Plantar fasciitis        Past Surgical History:    Past Surgical History:   Procedure Laterality Date     APPENDECTOMY       BIOPSY  2006    Lung     CATARACT IOL, RT/LT  6/26/2008    Right eye     CHOLECYSTECTOMY       COLONOSCOPY       Colorectal surgery       ENT SURGERY      Tympanoplasty     HC REMV CATARACT EXTRACAP,INSERT LENS  04/23/09    Left eye     HERNIA REPAIR      Incarcerated, 2 surgeries     LASER YAG CAPSULOTOMY  7/24/2014    Procedure: LASER YAG CAPSULOTOMY;  Surgeon: Wes Mcbride MD;  Location: [facility] OR     Right lung - lobectomy  2006       Family History:    No family history on file.    Social History:  Marital Status:   [5]  Social History   Substance Use Topics     Smoking status: Former Smoker     Quit date: 10/12/2001     Smokeless tobacco: Not on file     Alcohol use No        Medications:      albuterol (PROAIR HFA/PROVENTIL HFA/VENTOLIN HFA) 108 (90 Base) MCG/ACT inhaler   amLODIPine (NORVASC) 5 MG tablet   aspirin 81 MG chewable tablet   atenolol (TENORMIN) 50 MG tablet   BACTROBAN 2 % EX OINT   cetirizine (ZYRTEC) 10 MG tablet   desonide (DESOWEN) 0.05 % cream   docusate sodium (STOOL SOFTENER) 100 MG capsule   ferrous gluconate (FERGON) 324 (38 Fe) MG tablet   fluticasone (FLONASE) 50 MCG/ACT spray   fluticasone-salmeterol (ADVAIR DISKUS) 500-50 MCG/DOSE diskus inhaler   furosemide (LASIX) 40 MG tablet   lisinopril (PRINIVIL/ZESTRIL) 10 MG tablet   MULTIPLE VITAMIN ESSENTIAL TABS   Multiple Vitamins-Minerals (OCUVITE PRESERVISION PO)   omeprazole (PRILOSEC) 20 MG CR capsule   potassium chloride SA (K-DUR/KLOR-CON M) 10 MEQ CR tablet   tiotropium (SPIRIVA) 18 MCG capsule   triamcinolone (KENALOG) 0.1 % paste         Review of Systems   All other systems reviewed and are negative.      Physical Exam   BP: (!) 144/100  Pulse:  132  Heart Rate: 105  Temp: 98  F (36.7  C)  Resp: 22  SpO2: 97 %      Physical Exam   Constitutional: She is oriented to person, place, and time. No distress.   Chronically ill-appearing female, appears frail.  Wearing O2 via nasal cannula.   HENT:   Head: Normocephalic and atraumatic.   Eyes: Conjunctivae are normal.   Neck: Neck supple.   Cardiovascular: Normal heart sounds.  An irregular rhythm present. Tachycardia present.    Pulmonary/Chest: Effort normal. No respiratory distress. She has no wheezes.   Slightly diminished breath sounds in bases.   Abdominal: Soft. She exhibits no distension. There is no tenderness. There is no rebound.   Musculoskeletal: She exhibits edema (trace BLE). She exhibits no tenderness (in BLE).   Neurological: She is alert and oriented to person, place, and time.   Skin: Skin is warm and dry. She is not diaphoretic.   Psychiatric: She has a normal mood and affect.       ED Course     ED Course     Procedures         EKG Interpretation:      Interpreted by Dayna Marie  Time reviewed: 2355  Symptoms at time of EKG: weakness, palpitations   Rhythm: atrial fibrillation - rapid  Rate: 100-110  Axis: Left Axis Deviation  Ectopy: none  Conduction: normal  ST Segments/ T Waves: No acute ischemic changes  Q Waves: none  Comparison to prior: now with new onset a-fib with RVR    Clinical Impression: atrial fibrillation (new onset)             EKG Interpretation:      Interpreted by Dayna Marie  Time reviewed:0020   Symptoms at time of EKG: after diltiazem administration feeling improved  Rhythm: Normal sinus   Rate: Normal  Axis: Left Axis Deviation  Ectopy: None  Conduction: Normal  ST Segments/ T Waves: No acute ischemic changes  Q Waves: None  Comparison to prior: converted from a-fib to sinus rhythm    Clinical Impression: normal sinus rhythm        Critical Care time:  none      Results for orders placed or performed during the hospital encounter of 08/25/18 (from the past  24 hour(s))   CBC with platelets differential   Result Value Ref Range    WBC 10.8 4.0 - 11.0 10e9/L    RBC Count 3.52 (L) 3.8 - 5.2 10e12/L    Hemoglobin 10.2 (L) 11.7 - 15.7 g/dL    Hematocrit 32.5 (L) 35.0 - 47.0 %    MCV 92 78 - 100 fl    MCH 29.0 26.5 - 33.0 pg    MCHC 31.4 (L) 31.5 - 36.5 g/dL    RDW 13.4 10.0 - 15.0 %    Platelet Count 249 150 - 450 10e9/L    Diff Method Automated Method     % Neutrophils 72.9 %    % Lymphocytes 14.6 %    % Monocytes 8.8 %    % Eosinophils 3.0 %    % Basophils 0.4 %    % Immature Granulocytes 0.3 %    Nucleated RBCs 0 0 /100    Absolute Neutrophil 7.9 1.6 - 8.3 10e9/L    Absolute Lymphocytes 1.6 0.8 - 5.3 10e9/L    Absolute Monocytes 1.0 0.0 - 1.3 10e9/L    Absolute Basophils 0.0 0.0 - 0.2 10e9/L    Abs Immature Granulocytes 0.0 0 - 0.4 10e9/L    Absolute Nucleated RBC 0.0    Comprehensive metabolic panel   Result Value Ref Range    Sodium 136 133 - 144 mmol/L    Potassium 3.6 3.4 - 5.3 mmol/L    Chloride 97 94 - 109 mmol/L    Carbon Dioxide 32 20 - 32 mmol/L    Anion Gap 7 3 - 14 mmol/L    Glucose 134 (H) 70 - 99 mg/dL    Urea Nitrogen 22 7 - 30 mg/dL    Creatinine 1.07 (H) 0.52 - 1.04 mg/dL    GFR Estimate 49 (L) >60 mL/min/1.7m2    GFR Estimate If Black 60 (L) >60 mL/min/1.7m2    Calcium 8.5 8.5 - 10.1 mg/dL    Bilirubin Total 0.2 0.2 - 1.3 mg/dL    Albumin 3.2 (L) 3.4 - 5.0 g/dL    Protein Total 7.1 6.8 - 8.8 g/dL    Alkaline Phosphatase 111 40 - 150 U/L    ALT 29 0 - 50 U/L    AST 29 0 - 45 U/L   Troponin I   Result Value Ref Range    Troponin I ES <0.015 0.000 - 0.045 ug/L   Nt probnp inpatient (BNP)   Result Value Ref Range    N-Terminal Pro BNP Inpatient 1998 (H) 0 - 1800 pg/mL       Medications   0.9% sodium chloride BOLUS (0 mLs Intravenous Stopped 8/26/18 0045)   diltiazem (CARDIZEM) injection 20 mg (20 mg Intravenous Given 8/26/18 0007)       Assessments & Plan (with Medical Decision Making)  Janice K Goodell is a 82 year old email with a history of COPD on  chronic O2 who presented to the ED complaining of palpitations, weakness, and lightheadedness that developed in the last couple days.  Denies chest pain.  Slight increase in shortness of breath from baseline.  On arrival to the ED patient tachycardic ranging from 100-140s with O2 saturations 98% on home O2.  Blood pressure 144/100.  Noted to have an irregular, tachycardic rhythm on exam today suggestive of atrial fibrillation or other dysrhythmias.  Otherwise no acute abnormalities found on exam.  Given slow IV fluids for heart rate and lightheadedness.  EKG obtained and confirmed atrial fibrillation with RVR.  Troponin undetectable, BNP mildly elevated at 1998.  CBC appeared near her baseline, CMP with mildly elevated creatinine of 1.07 which is also near her baseline.  Patient was administered 20 mg IV bolus of diltiazem here in the ED.  Within 5-10 minutes it was noted on cardiac monitoring that she had converted back into a sinus rhythm.  EKG confirmed a normal sinus rhythm and there were no additional arrhythmias or acute ischemic changes.  Patient did report feeling improved.  At this point I am not seeing any indication for hospitalization.  I did discuss case with Dr. Durand, hospitalist, for outpatient management consultation.  Because of her recent liver biopsy 3 days ago we did not want to start anticoagulation for at least a week due to high risk of bleeding.  She is already on atenolol 50 mg twice daily which should provide adequate rate control in the meantime.  She has a follow-up visit for Tuesday, 3 days from now, which will also be beneficial for her to discuss further evaluation if indicated.  May benefit from a zio patch to monitor if she is paroxysmally going into atrial fibrillation.  She was advised to take it easy over the next few days in the meantime.  I did go over warning signs and symptoms of when she should return to the emergency department.  Patient expressed understanding and was  agreeable to this plan.  She was discharged home in suitable condition.     I have reviewed the nursing notes.    I have reviewed the findings, diagnosis, plan and need for follow up with the patient.       New Prescriptions    No medications on file       Final diagnoses:   Atrial fibrillation with rapid ventricular response (H)     Note: Chart documentation done in part with Dragon Voice Recognition software. Although reviewed after completion, some word and grammatical errors may remain.     8/25/2018   [facility] EMERGENCY DEPARTMENT     Dayna Marie PA-C  08/26/18 0055

## 2018-09-04 ENCOUNTER — HOSPITAL ENCOUNTER (EMERGENCY)
Facility: CLINIC | Age: 83
Discharge: HOME | End: 2018-09-05
Attending: FAMILY MEDICINE | Admitting: FAMILY MEDICINE
Payer: MEDICARE

## 2018-09-04 DIAGNOSIS — I48.0 PAROXYSMAL ATRIAL FIBRILLATION (H): ICD-10-CM

## 2018-09-04 DIAGNOSIS — J44.9 CHRONIC OBSTRUCTIVE PULMONARY DISEASE, UNSPECIFIED COPD TYPE (H): ICD-10-CM

## 2018-09-04 DIAGNOSIS — D64.9 ANEMIA, UNSPECIFIED TYPE: ICD-10-CM

## 2018-09-04 DIAGNOSIS — C34.90 SMALL CELL CARCINOMA OF LUNG, UNSPECIFIED LATERALITY: ICD-10-CM

## 2018-09-04 LAB
ANION GAP SERPL CALCULATED.3IONS-SCNC: 10 MMOL/L (ref 3–14)
APTT PPP: 25 SEC (ref 22–37)
BASOPHILS # BLD AUTO: 0 10E9/L (ref 0–0.2)
BASOPHILS NFR BLD AUTO: 0.2 %
BUN SERPL-MCNC: 34 MG/DL (ref 7–30)
CALCIUM SERPL-MCNC: 8.8 MG/DL (ref 8.5–10.1)
CHLORIDE SERPL-SCNC: 98 MMOL/L (ref 94–109)
CO2 SERPL-SCNC: 32 MMOL/L (ref 20–32)
CREAT SERPL-MCNC: 1.03 MG/DL (ref 0.52–1.04)
DIFFERENTIAL METHOD BLD: ABNORMAL
EOSINOPHIL NFR BLD AUTO: 3.1 %
ERYTHROCYTE [DISTWIDTH] IN BLOOD BY AUTOMATED COUNT: 13.2 % (ref 10–15)
GFR SERPL CREATININE-BSD FRML MDRD: 51 ML/MIN/1.7M2
GLUCOSE SERPL-MCNC: 164 MG/DL (ref 70–99)
HCT VFR BLD AUTO: 31.8 % (ref 35–47)
HGB BLD-MCNC: 9.9 G/DL (ref 11.7–15.7)
IMM GRANULOCYTES # BLD: 0 10E9/L (ref 0–0.4)
IMM GRANULOCYTES NFR BLD: 0.3 %
INR PPP: 0.96 (ref 0.86–1.14)
LYMPHOCYTES # BLD AUTO: 1.4 10E9/L (ref 0.8–5.3)
LYMPHOCYTES NFR BLD AUTO: 21.4 %
MCH RBC QN AUTO: 28.9 PG (ref 26.5–33)
MCHC RBC AUTO-ENTMCNC: 31.1 G/DL (ref 31.5–36.5)
MCV RBC AUTO: 93 FL (ref 78–100)
MONOCYTES # BLD AUTO: 0.1 10E9/L (ref 0–1.3)
MONOCYTES NFR BLD AUTO: 1.8 %
NEUTROPHILS # BLD AUTO: 4.8 10E9/L (ref 1.6–8.3)
NEUTROPHILS NFR BLD AUTO: 73.2 %
NRBC # BLD AUTO: 0 10*3/UL
NRBC BLD AUTO-RTO: 0 /100
PLATELET # BLD AUTO: 221 10E9/L (ref 150–450)
POTASSIUM SERPL-SCNC: 3.6 MMOL/L (ref 3.4–5.3)
RBC # BLD AUTO: 3.42 10E12/L (ref 3.8–5.2)
SODIUM SERPL-SCNC: 140 MMOL/L (ref 133–144)
TROPONIN I SERPL-MCNC: <0.015 UG/L (ref 0–0.04)
WBC # BLD AUTO: 6.5 10E9/L (ref 4–11)

## 2018-09-04 PROCEDURE — 25000132 ZZH RX MED GY IP 250 OP 250 PS 637: Mod: GY | Performed by: FAMILY MEDICINE

## 2018-09-04 PROCEDURE — 84484 ASSAY OF TROPONIN QUANT: CPT | Performed by: FAMILY MEDICINE

## 2018-09-04 PROCEDURE — 85025 COMPLETE CBC W/AUTO DIFF WBC: CPT | Performed by: FAMILY MEDICINE

## 2018-09-04 PROCEDURE — 93010 ELECTROCARDIOGRAM REPORT: CPT | Mod: 76 | Performed by: FAMILY MEDICINE

## 2018-09-04 PROCEDURE — 96360 HYDRATION IV INFUSION INIT: CPT | Performed by: FAMILY MEDICINE

## 2018-09-04 PROCEDURE — A9270 NON-COVERED ITEM OR SERVICE: HCPCS | Mod: GY | Performed by: FAMILY MEDICINE

## 2018-09-04 PROCEDURE — 96361 HYDRATE IV INFUSION ADD-ON: CPT | Performed by: FAMILY MEDICINE

## 2018-09-04 PROCEDURE — 85730 THROMBOPLASTIN TIME PARTIAL: CPT | Performed by: FAMILY MEDICINE

## 2018-09-04 PROCEDURE — 93010 ELECTROCARDIOGRAM REPORT: CPT | Mod: Z6 | Performed by: FAMILY MEDICINE

## 2018-09-04 PROCEDURE — 99285 EMERGENCY DEPT VISIT HI MDM: CPT | Mod: 25 | Performed by: FAMILY MEDICINE

## 2018-09-04 PROCEDURE — 25000128 H RX IP 250 OP 636: Performed by: FAMILY MEDICINE

## 2018-09-04 PROCEDURE — 96374 THER/PROPH/DIAG INJ IV PUSH: CPT | Performed by: FAMILY MEDICINE

## 2018-09-04 PROCEDURE — 85610 PROTHROMBIN TIME: CPT | Performed by: FAMILY MEDICINE

## 2018-09-04 PROCEDURE — 80048 BASIC METABOLIC PNL TOTAL CA: CPT | Performed by: FAMILY MEDICINE

## 2018-09-04 PROCEDURE — 93005 ELECTROCARDIOGRAM TRACING: CPT | Mod: 76 | Performed by: FAMILY MEDICINE

## 2018-09-04 PROCEDURE — 93005 ELECTROCARDIOGRAM TRACING: CPT | Performed by: FAMILY MEDICINE

## 2018-09-04 RX ORDER — DILTIAZEM HYDROCHLORIDE 5 MG/ML
15 INJECTION INTRAVENOUS ONCE
Status: COMPLETED | OUTPATIENT
Start: 2018-09-04 | End: 2018-09-05

## 2018-09-04 RX ORDER — ASPIRIN 81 MG/1
324 TABLET, CHEWABLE ORAL ONCE
Status: COMPLETED | OUTPATIENT
Start: 2018-09-04 | End: 2018-09-04

## 2018-09-04 RX ORDER — CALCIUM GLUCONATE 94 MG/ML
0.5 INJECTION, SOLUTION INTRAVENOUS ONCE
Status: DISCONTINUED | OUTPATIENT
Start: 2018-09-04 | End: 2018-09-05 | Stop reason: HOSPADM

## 2018-09-04 RX ORDER — SODIUM CHLORIDE 9 MG/ML
1000 INJECTION, SOLUTION INTRAVENOUS CONTINUOUS
Status: DISCONTINUED | OUTPATIENT
Start: 2018-09-04 | End: 2018-09-05 | Stop reason: HOSPADM

## 2018-09-04 RX ADMIN — SODIUM CHLORIDE 1000 ML: 9 INJECTION, SOLUTION INTRAVENOUS at 23:23

## 2018-09-04 RX ADMIN — ASPIRIN 81 MG 324 MG: 81 TABLET ORAL at 23:24

## 2018-09-04 NOTE — ED AVS SNAPSHOT
[facility] Emergency Department    [address] [place] [address]    Phone:  [phone]    Fax:  [phone]                                       Janice K Goodell   MRN: [id number]    Department:  [facility] Emergency Department   Date of Visit:  9/4/2018           After Visit Summary Signature Page     I have received my discharge instructions, and my questions have been answered. I have discussed any challenges I see with this plan with the nurse or doctor.    ..........................................................................................................................................  Patient/Patient Representative Signature      ..........................................................................................................................................  Patient Representative Print Name and Relationship to Patient    ..................................................               ................................................  Date                                            Time    ..........................................................................................................................................  Reviewed by Signature/Title    ...................................................              ..............................................  Date                                                            Time          22EPIC Rev 08/18

## 2018-09-04 NOTE — ED AVS SNAPSHOT
[facility] Emergency Department    [address] [place] [address]    Phone:  [phone]    Fax:  [phone]                                       Janice K Goodell   MRN: [id number]    Department:  [facility] Emergency Department   Date of Visit:  9/4/2018           Patient Information     Date Of Birth          1935        Your diagnoses for this visit were:     Paroxysmal atrial fibrillation (H) with RVR    Anemia, unspecified type Hgb 9.9       You were seen by Maxim Ashford MD.      Follow-up Information     Follow up with cardiology On 9/11/2018.    Why:  as scheduled        Discharge Instructions       Stop the atenolol and switch to metoprolol 50 mg twice a day instead.    I sent your prescription to [facility] in [place].    We sent 1 dose home with you that you can take in the morning.    Take a full aspirin daily and keep your appointment with cardiology on Tuesday as scheduled.  Please return to the ED if you worsen or have any concerns.  It was a pleasure visiting with both of you tonight.  I am glad you are feeling better and hope you continue to improve.  I wish you the very best going forward.    Thank you for choosing [facility]. We appreciate the opportunity to meet your urgent medical needs. Please let us know if we could have done anything to make your stay more satisfying.    After discharge, please closely monitor for any new or worsening symptoms. Return to the Emergency Department if you develop any acute worsening signs or symptoms.    If you had lab work, cultures or imaging studies done during your stay, the final results may still be pending. We will call you if your plan of care needs to change. However, if you are not improving as expected, please follow up with your primary care provider or clinic.     Start any prescription medications that were prescribed to you and take them as directed.     Please see  additional handouts that may be pertinent to your condition.        Discharge References/Attachments     FIBRILLATION, ATRIAL (ENGLISH)    ANEMIA DURING CANCER (ENGLISH)      24 Hour Appointment Hotline       To make an appointment at any [facility] clinic, call [phone] ([phone]). If you don't have a family doctor or clinic, we will help you find one. [facility] clinics are conveniently located to serve the needs of you and your family.             Review of your medicines      START taking        Dose / Directions Last dose taken    aspirin 325 MG tablet   Dose:  325 mg   Quantity:  120 tablet   Replaces:  aspirin 81 MG chewable tablet        Take 1 tablet (325 mg) by mouth daily   Refills:  0        metoprolol tartrate 50 MG tablet   Commonly known as:  LOPRESSOR   Dose:  50 mg   Quantity:  60 tablet        Take 1 tablet (50 mg) by mouth 2 times daily   Refills:  0          Our records show that you are taking the medicines listed below. If these are incorrect, please call your family doctor or clinic.        Dose / Directions Last dose taken    ADVAIR DISKUS 500-50 MCG/DOSE diskus inhaler   Dose:  1 puff   Generic drug:  fluticasone-salmeterol        Inhale 1 puff into the lungs 2 times daily   Refills:  0        albuterol 108 (90 Base) MCG/ACT inhaler   Commonly known as:  PROAIR HFA/PROVENTIL HFA/VENTOLIN HFA   Dose:  2 puff        Inhale 2 puffs into the lungs 4 times daily as needed   Refills:  0        amLODIPine 5 MG tablet   Commonly known as:  NORVASC   Dose:  5 mg        Take 5 mg by mouth 2 times daily   Refills:  0        BACTROBAN 2 % ointment   Generic drug:  mupirocin        in nose prn   Refills:  0        cetirizine 10 MG tablet   Commonly known as:  zyrTEC   Dose:  10 mg   Quantity:  20 tablet        Take 1 tablet by mouth 2 times daily as needed (itch, swelling of lips).   Refills:  0        desonide 0.05 % cream   Commonly known as:  DESOWEN        Apply topically as needed   Refills:   0        ferrous gluconate 324 (38 Fe) MG tablet   Commonly known as:  FERGON   Dose:  324 mg        Take 324 mg by mouth daily   Refills:  0        fluticasone 50 MCG/ACT spray   Commonly known as:  FLONASE   Dose:  1 spray        Spray 1 spray in nostril daily   Refills:  0        furosemide 40 MG tablet   Commonly known as:  LASIX   Dose:  40 mg        Take 40 mg by mouth daily   Refills:  0        lisinopril 10 MG tablet   Commonly known as:  PRINIVIL/ZESTRIL   Dose:  10 mg        Take 10 mg by mouth 2 times daily   Refills:  0        MULTIPLE VITAMIN ESSENTIAL Tabs   Dose:  1 tablet        Take 1 tablet by mouth daily   Refills:  0        OCUVITE PRESERVISION PO   Dose:  1 tablet        Take 1 tablet by mouth 2 times daily   Refills:  0        omeprazole 20 MG CR capsule   Commonly known as:  priLOSEC   Dose:  20 mg        Take 20 mg by mouth 2 times daily   Refills:  0        potassium chloride SA 10 MEQ CR tablet   Commonly known as:  K-DUR/KLOR-CON M   Dose:  10 mEq        Take 10 mEq by mouth 2 times daily (with meals)   Refills:  0        STOOL SOFTENER 100 MG capsule   Dose:  100 mg   Generic drug:  docusate sodium        Take 100 mg by mouth daily   Refills:  0        tiotropium 18 MCG capsule   Commonly known as:  SPIRIVA   Dose:  18 mcg        Inhale 18 mcg into the lungs daily   Refills:  0        triamcinolone 0.1 % paste   Commonly known as:  KENALOG        2 times daily as needed   Refills:  0          STOP taking        Dose Reason for stopping Comments    aspirin 81 MG chewable tablet   Replaced by:  aspirin 325 MG tablet              atenolol 50 MG tablet   Commonly known as:  TENORMIN                      Prescriptions were sent or printed at these locations (2 Prescriptions)                   [facility] [place], [place] - [address]   [address] [address]    Telephone:  [phone]   Fax:  [phone]   Hours:                  E-Prescribed (1 of 2)         metoprolol  "tartrate (LOPRESSOR) 50 MG tablet                 Not Printed or Sent (1 of 2)         aspirin 325 MG tablet                Procedures and tests performed during your visit     Basic metabolic panel    CBC with platelets differential    Cardiac Continuous Monitoring    EKG 12 lead    EKG 12-lead, tracing only    INR    Partial thromboplastin time    Peripheral IV: Standard    Pulse oximetry nursing    Troponin I      Orders Needing Specimen Collection     None      Pending Results     No orders found for last 3 day(s).            Pending Culture Results     No orders found for last 3 day(s).            Pending Results Instructions     If you had any lab results that were not finalized at the time of your Discharge, you can call the ED Lab Result RN at [phone]. You will be contacted by this team for any positive Lab results or changes in treatment. The nurses are available 7 days a week from 10A to 6:30P.  You can leave a message 24 hours per day and they will return your call.        Thank you for choosing [facility]       Thank you for choosing [facility] for your care. Our goal is always to provide you with excellent care. Hearing back from our patients is one way we can continue to improve our services. Please take a few minutes to complete the written survey that you may receive in the mail after you visit with us. Thank you!        [facility] Information     [facility] [facility] lets you send messages to your doctor, view your test results, renew your prescriptions, schedule appointments and more. To sign up, go to [email or web address] . Click on \"Log in\" on the left side of the screen, which will take you to the Welcome page. Then click on \"Sign up Now\" on the right side of the page.     You will be asked to enter the access code listed below, as well as some personal information. Please follow the directions to create your username and password.     Your access code is: [email or web address]  Expires: 11/24/2018 12:56 AM     Your " access code will  in 90 days. If you need help or a new code, please call your [facility] clinic or [phone].        Care EveryWhere ID     This is your Care EveryWhere ID. This could be used by other organizations to access your [facility] medical records  [id number]        Equal Access to Services     VARINDER VARGHESE : Bharat croucho Sogaetano, waaxda luqadaha, qaybta kaalmada matias, charito vasquez. So [facility] [phone].    ATENCIÓN: Si habla español, tiene a roldan disposición servicios gratuitos de asistencia lingüística. Llame al [phone].    We comply with applicable federal civil rights laws and [place] laws. We do not discriminate on the basis of race, color, national origin, age, disability, sex, sexual orientation, or gender identity.            After Visit Summary       This is your record. Keep this with you and show to your community pharmacist(s) and doctor(s) at your next visit.

## 2018-09-05 VITALS
BODY MASS INDEX: 26.65 KG/M2 | DIASTOLIC BLOOD PRESSURE: 69 MMHG | RESPIRATION RATE: 18 BRPM | OXYGEN SATURATION: 98 % | WEIGHT: 136.47 LBS | HEART RATE: 78 BPM | TEMPERATURE: 97.5 F | SYSTOLIC BLOOD PRESSURE: 140 MMHG

## 2018-09-05 PROCEDURE — 25000125 ZZHC RX 250: Performed by: FAMILY MEDICINE

## 2018-09-05 PROCEDURE — 25000132 ZZH RX MED GY IP 250 OP 250 PS 637: Mod: GY | Performed by: FAMILY MEDICINE

## 2018-09-05 PROCEDURE — 25000128 H RX IP 250 OP 636: Performed by: FAMILY MEDICINE

## 2018-09-05 PROCEDURE — 96374 THER/PROPH/DIAG INJ IV PUSH: CPT | Performed by: FAMILY MEDICINE

## 2018-09-05 PROCEDURE — 96361 HYDRATE IV INFUSION ADD-ON: CPT | Performed by: FAMILY MEDICINE

## 2018-09-05 RX ORDER — ASPIRIN 325 MG
325 TABLET ORAL DAILY
Qty: 120 TABLET | COMMUNITY
Start: 2018-09-05 | End: 2019-06-24

## 2018-09-05 RX ORDER — METOPROLOL TARTRATE 50 MG
50 TABLET ORAL ONCE
Status: COMPLETED | OUTPATIENT
Start: 2018-09-05 | End: 2018-09-05

## 2018-09-05 RX ORDER — METOPROLOL TARTRATE 50 MG
50 TABLET ORAL 2 TIMES DAILY
Qty: 60 TABLET | Refills: 0 | Status: SHIPPED | OUTPATIENT
Start: 2018-09-05

## 2018-09-05 RX ADMIN — DILTIAZEM HYDROCHLORIDE 15 MG: 5 INJECTION INTRAVENOUS at 00:06

## 2018-09-05 RX ADMIN — METOPROLOL TARTRATE 50 MG: 50 TABLET, FILM COATED ORAL at 01:42

## 2018-09-05 RX ADMIN — SODIUM CHLORIDE 1000 ML: 9 INJECTION, SOLUTION INTRAVENOUS at 00:06

## 2018-09-05 NOTE — ED NOTES
Pt up to commode at bedside with episode of shortness of breath when getting back into bed, sats at 88% but quickly recovered after 1 minute in bed and sats back up to 97%, primary RN notified

## 2018-09-05 NOTE — ED PROVIDER NOTES
History     Chief Complaint   Patient presents with     Atrial Fib     HPI  Janice K Goodell is a 82 year old female who presents to the ED tonight with concerns about recurrent A. fib.  She had her first episode of atrial fibrillation on August 28 and was seen in the ED and converted after 1 dose of IV diltiazem 20 mg.  She had just had a liver biopsy 3 days prior so they elected to not anticoagulate her.  She had a follow-up appt. with her primary physician 3 days later and was in a regular rhythm.  Cardiology referral is in process.    She just recently found out that her lung cancer came back and has finished 3 doses of chemotherapy this past week.  She also has COPD.  Breathing is about at baseline.  She had a little bit of heaviness and pressure in her chest at home but that has decreased since she arrived.  Symptoms started tonight just before 10 PM when she got up from watching TV to get some ice water before bed.  She felt like her heart started pumping hard and then the heaviness and slight shortness of breath started.  She took her blood pressure and it was 159/93 with a heart rate of 158.  Shortly thereafter she checked it again and her blood pressure was 110/82 with a heart rate of 135.    She is on a beta-blocker as she takes atenolol 50 mg twice a day.  Is also on Norvasc lisinopril and Lasix.    Here with her daughter-in-law.    Problem List:    Patient Active Problem List    Diagnosis Date Noted     community acquired pneumonia 09/22/2012     Priority: High     Anemia 09/23/2012     Priority: Medium     COPD (chronic obstructive pulmonary disease) (H) 09/22/2012     Priority: Medium     CKD (chronic kidney disease) stage 3, GFR 30-59 ml/min 09/22/2012     Priority: Medium     Hypokalemia 09/22/2012     Priority: Medium     HTN (hypertension) 09/22/2012     Priority: Medium     Personal history of Lung cancer - right lobectomy 2006, chemo 2007 09/22/2012     Priority: Low        Past Medical  History:    Past Medical History:   Diagnosis Date     COPD (chronic obstructive pulmonary disease) (H)      Heart valve disorder      Hypertension      Macular degeneration      Malignant neoplasm (H) 2006     Plantar fasciitis        Past Surgical History:    Past Surgical History:   Procedure Laterality Date     APPENDECTOMY       BIOPSY  2006    Lung     CATARACT IOL, RT/LT  6/26/2008    Right eye     CHOLECYSTECTOMY       COLONOSCOPY       Colorectal surgery       ENT SURGERY      Tympanoplasty     HC REMV CATARACT EXTRACAP,INSERT LENS  04/23/09    Left eye     HERNIA REPAIR      Incarcerated, 2 surgeries     LASER YAG CAPSULOTOMY  7/24/2014    Procedure: LASER YAG CAPSULOTOMY;  Surgeon: Wes Mcbride MD;  Location: [facility] OR     Right lung - lobectomy  2006       Family History:    No family history on file.    Social History:  Marital Status:   [5]  Social History   Substance Use Topics     Smoking status: Former Smoker     Quit date: 10/12/2001     Smokeless tobacco: Not on file     Alcohol use No        Medications:      aspirin 325 MG tablet   metoprolol tartrate (LOPRESSOR) 50 MG tablet   albuterol (PROAIR HFA/PROVENTIL HFA/VENTOLIN HFA) 108 (90 Base) MCG/ACT inhaler   amLODIPine (NORVASC) 5 MG tablet   BACTROBAN 2 % EX OINT   cetirizine (ZYRTEC) 10 MG tablet   desonide (DESOWEN) 0.05 % cream   docusate sodium (STOOL SOFTENER) 100 MG capsule   ferrous gluconate (FERGON) 324 (38 Fe) MG tablet   fluticasone (FLONASE) 50 MCG/ACT spray   fluticasone-salmeterol (ADVAIR DISKUS) 500-50 MCG/DOSE diskus inhaler   furosemide (LASIX) 40 MG tablet   lisinopril (PRINIVIL/ZESTRIL) 10 MG tablet   MULTIPLE VITAMIN ESSENTIAL TABS   Multiple Vitamins-Minerals (OCUVITE PRESERVISION PO)   omeprazole (PRILOSEC) 20 MG CR capsule   potassium chloride SA (K-DUR/KLOR-CON M) 10 MEQ CR tablet   tiotropium (SPIRIVA) 18 MCG capsule   triamcinolone (KENALOG) 0.1 % paste         Review of Systems   All other systems  reviewed and are negative.      Physical Exam   BP: 90/62  Pulse: 78  Heart Rate: 132  Temp: 98.1  F (36.7  C)  Resp: 22  Weight: 61.9 kg (136 lb 7.4 oz)  SpO2: 97 %      Physical Exam   Constitutional: She is oriented to person, place, and time. She appears well-developed and well-nourished. No distress.   HENT:   Mouth/Throat: Oropharynx is clear and moist.   Eyes: EOM are normal.   Neck: Neck supple.   Cardiovascular: An irregularly irregular rhythm present. Tachycardia present.    Pulmonary/Chest: Effort normal and breath sounds normal.   Abdominal: Soft. There is no tenderness.   Musculoskeletal: Normal range of motion. She exhibits no edema or tenderness.   Neurological: She is alert and oriented to person, place, and time.   Skin: Skin is warm and dry. No erythema.   Psychiatric: She has a normal mood and affect.       ED Course  (with Medical Decision Making)    82-year-old female with her second occurrence of atrial fibrillation, the first occurred last week and she converted back to sinus rhythm with 1 dose of IV diltiazem.  IV placed.  We will give her saline bolus to support her blood pressure and then try another dose of IV diltiazem for rate control.  If her blood pressure does not support this, will need to consider cardioversion.    She was given a dose of Cardizem 15 mg IV along with calcium gluconate 500 mg IV to support her blood pressure.  Her heart rate did slow into the 70s but still appeared to be in A. fib.  We will watch and see if she converts on her own after slowing her rate like she did last time.    Hemoglobin down a bit at 9.9 likely related to her chemotherapy.  Renal function is stable.  Troponin was undetectable.    Her heart rate slowed into the 70s and remained in A. fib but soon she converted to sinus rhythm and remained in sinus rhythm for at least another hour.  She was ambulatory and felt well.  She had no chest discomfort in her shortness of breath is at baseline.  I spoke  with Dr. Durand, the hospitalist on call.  Her 2 episodes of A. fib have been very short-lived and she has converted with just a single dose of diltiazem.  He suggested switching her from atenolol over to metoprolol and keeping her at 50 mg twice a day.  She is over 2 weeks out from her liver biopsy and she was hoping to restart her aspirin.  We will bump her up to full dose aspirin once a day and she has a an appointment to see cardiology early next week.  We will hold off on further anticoagulation until she speaks with cardiology since she is aware when she goes into A. fib and her episodes have been just short-lived both times.       ED Course     Procedures               EKG Interpretation:      Interpreted by Maxim Ashford  Time reviewed: 2248  Symptoms at time of EKG: rapid afib   Rhythm: atrial fibrillation - rapid  Rate: 132  Axis: Left Axis Deviation -33  Ectopy: none  Conduction: normal  ST Segments/ T Waves: Poor R wave progression and nonspecific ST depression anterior laterally  Q Waves: none  Comparison to prior: Compared to the EKG on 8/25/2018 when she was also in A. fib, there has been no significant change.    Clinical Impression: Paroxysmal atrial fibrillation with rapid ventricular response.             EKG Interpretation:      Interpreted by Maxim Ashford  Time reviewed:0056   Symptoms at time of EKG: converted from AFib to NSR   Rhythm: Normal sinus   Rate: 76  Axis: Normal  Ectopy: None  Conduction: Normal  ST Segments/ T Waves: No acute ischemic changes and Poor R wave progression  Q Waves: None  Comparison to prior: Compared to the EKG done earlier this evening when she was in A. fib with RVR, she is now converted to sinus rhythm and the ST/T-wave changes have resolved.    Clinical Impression: Now converted to normal sinus rhythm at 76 bpm.  Poor R-wave progression is a chronic.  She has no acute ischemic changes on this EKG.            Critical Care time:  none                Results for orders placed or performed during the hospital encounter of 09/04/18 (from the past 24 hour(s))   CBC with platelets differential   Result Value Ref Range    WBC 6.5 4.0 - 11.0 10e9/L    RBC Count 3.42 (L) 3.8 - 5.2 10e12/L    Hemoglobin 9.9 (L) 11.7 - 15.7 g/dL    Hematocrit 31.8 (L) 35.0 - 47.0 %    MCV 93 78 - 100 fl    MCH 28.9 26.5 - 33.0 pg    MCHC 31.1 (L) 31.5 - 36.5 g/dL    RDW 13.2 10.0 - 15.0 %    Platelet Count 221 150 - 450 10e9/L    Diff Method Automated Method     % Neutrophils 73.2 %    % Lymphocytes 21.4 %    % Monocytes 1.8 %    % Eosinophils 3.1 %    % Basophils 0.2 %    % Immature Granulocytes 0.3 %    Nucleated RBCs 0 0 /100    Absolute Neutrophil 4.8 1.6 - 8.3 10e9/L    Absolute Lymphocytes 1.4 0.8 - 5.3 10e9/L    Absolute Monocytes 0.1 0.0 - 1.3 10e9/L    Absolute Basophils 0.0 0.0 - 0.2 10e9/L    Abs Immature Granulocytes 0.0 0 - 0.4 10e9/L    Absolute Nucleated RBC 0.0    Basic metabolic panel   Result Value Ref Range    Sodium 140 133 - 144 mmol/L    Potassium 3.6 3.4 - 5.3 mmol/L    Chloride 98 94 - 109 mmol/L    Carbon Dioxide 32 20 - 32 mmol/L    Anion Gap 10 3 - 14 mmol/L    Glucose 164 (H) 70 - 99 mg/dL    Urea Nitrogen 34 (H) 7 - 30 mg/dL    Creatinine 1.03 0.52 - 1.04 mg/dL    GFR Estimate 51 (L) >60 mL/min/1.7m2    GFR Estimate If Black 62 >60 mL/min/1.7m2    Calcium 8.8 8.5 - 10.1 mg/dL   Troponin I   Result Value Ref Range    Troponin I ES <0.015 0.000 - 0.045 ug/L   INR   Result Value Ref Range    INR 0.96 0.86 - 1.14   Partial thromboplastin time   Result Value Ref Range    PTT 25 22 - 37 sec       Medications   0.9% sodium chloride BOLUS (0 mLs Intravenous Stopped 9/5/18 0002)     Followed by   sodium chloride 0.9% infusion (0 mLs Intravenous Stopped 9/5/18 0135)   calcium gluconate 10 % injection 0.5 g (0.5 g Intravenous Not Given 9/5/18 0102)   aspirin chewable tablet 324 mg (324 mg Oral Given 9/4/18 5254)   diltiazem (CARDIZEM) injection 15 mg (15 mg  Intravenous Given 9/5/18 0006)   metoprolol tartrate (LOPRESSOR) tablet 50 mg (50 mg Oral Given 9/5/18 0142)       Assessments & Plan      I have reviewed the nursing notes.    I have reviewed the findings, diagnosis, plan and need for follow up with the patient.       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

## 2018-09-05 NOTE — ED NOTES
Pt was up and ambulated with monitor on and remained in sinus rhythm with oxygen sats maintained as well, stable on feet

## 2018-09-05 NOTE — ED TRIAGE NOTES
"Pt presents with concerns of A-fib.  Pt states that tonight when she got up for ice water her heart started \"pumping hard\".   Pt is a little short of breath and her chest is bothering her.    "

## 2018-09-13 DIAGNOSIS — C34.02 MALIGNANT NEOPLASM OF LEFT MAIN BRONCHUS (H): ICD-10-CM

## 2018-09-13 DIAGNOSIS — C34.02 MALIGNANT NEOPLASM OF LEFT MAIN BRONCHUS (H): Primary | ICD-10-CM

## 2018-09-13 LAB
BASOPHILS # BLD AUTO: 0 10E9/L (ref 0–0.2)
BASOPHILS NFR BLD AUTO: 0 %
DIFFERENTIAL METHOD BLD: ABNORMAL
EOSINOPHIL # BLD AUTO: 0.1 10E9/L (ref 0–0.7)
EOSINOPHIL NFR BLD AUTO: 5 %
ERYTHROCYTE [DISTWIDTH] IN BLOOD BY AUTOMATED COUNT: 12.3 % (ref 10–15)
HCT VFR BLD AUTO: 25.4 % (ref 35–47)
HGB BLD-MCNC: 8.1 G/DL (ref 11.7–15.7)
LYMPHOCYTES # BLD AUTO: 1.1 10E9/L (ref 0.8–5.3)
LYMPHOCYTES NFR BLD AUTO: 75 %
MCH RBC QN AUTO: 29 PG (ref 26.5–33)
MCHC RBC AUTO-ENTMCNC: 31.9 G/DL (ref 31.5–36.5)
MCV RBC AUTO: 91 FL (ref 78–100)
MONOCYTES # BLD AUTO: 0.2 10E9/L (ref 0–1.3)
MONOCYTES NFR BLD AUTO: 16 %
NEUTROPHILS # BLD AUTO: 0.1 10E9/L (ref 1.6–8.3)
NEUTROPHILS NFR BLD AUTO: 4 %
PLATELET # BLD AUTO: 45 10E9/L (ref 150–450)
RBC # BLD AUTO: 2.79 10E12/L (ref 3.8–5.2)
WBC # BLD AUTO: 1.4 10E9/L (ref 4–11)

## 2018-09-13 PROCEDURE — 36415 COLL VENOUS BLD VENIPUNCTURE: CPT | Performed by: INTERNAL MEDICINE

## 2018-09-13 PROCEDURE — 85025 COMPLETE CBC W/AUTO DIFF WBC: CPT | Performed by: INTERNAL MEDICINE

## 2018-09-14 ENCOUNTER — HOSPITAL ENCOUNTER (EMERGENCY)
Facility: CLINIC | Age: 83
Discharge: HOME | End: 2018-09-14
Attending: FAMILY MEDICINE | Admitting: FAMILY MEDICINE
Payer: MEDICARE

## 2018-09-14 ENCOUNTER — APPOINTMENT (OUTPATIENT)
Dept: ULTRASOUND IMAGING | Facility: CLINIC | Age: 83
End: 2018-09-14
Attending: FAMILY MEDICINE
Payer: MEDICARE

## 2018-09-14 VITALS
TEMPERATURE: 98.9 F | DIASTOLIC BLOOD PRESSURE: 74 MMHG | BODY MASS INDEX: 25.15 KG/M2 | WEIGHT: 128.8 LBS | SYSTOLIC BLOOD PRESSURE: 152 MMHG | OXYGEN SATURATION: 99 % | RESPIRATION RATE: 20 BRPM

## 2018-09-14 DIAGNOSIS — R21 RASH AND NONSPECIFIC SKIN ERUPTION: ICD-10-CM

## 2018-09-14 PROCEDURE — 93971 EXTREMITY STUDY: CPT | Mod: LT

## 2018-09-14 PROCEDURE — 99284 EMERGENCY DEPT VISIT MOD MDM: CPT | Mod: 25 | Performed by: FAMILY MEDICINE

## 2018-09-14 PROCEDURE — 99283 EMERGENCY DEPT VISIT LOW MDM: CPT | Mod: Z6 | Performed by: FAMILY MEDICINE

## 2018-09-14 NOTE — ED PROVIDER NOTES
History     Chief Complaint   Patient presents with     Leg Pain     The history is provided by the patient.     Janice K Goodell is a 82 year old female who presents to the emergency department with concerns of left leg pain. The patient is currently getting chemo treatments for her cancer and is concerned that she could have a blood clot in her left leg. She started noticing some discomfort behind her left leg when she woke up this morning. She says it hurts to walk on her leg and it hurts to touch that spot. She noticed about 3-4 hours ago that there was a lump behind her knee. The patient has not had any blood clots before. She does not remember hitting her leg on anything. She has neuropathy in her legs, but does not have any new numbness or tingling in her legs. She is not currently on any blood thinners. Her doctors have been talking about putting her on blood thinners, but she has not yet been placed on any. Patient reports that she had blood work done last night and was told that she is low on red blood cells and platelets. Patient notes that she recently had her Atenolol changed to Metoprolol.    Problem List:    Patient Active Problem List    Diagnosis Date Noted     community acquired pneumonia 09/22/2012     Priority: High     Anemia 09/23/2012     Priority: Medium     COPD (chronic obstructive pulmonary disease) (H) 09/22/2012     Priority: Medium     CKD (chronic kidney disease) stage 3, GFR 30-59 ml/min 09/22/2012     Priority: Medium     Hypokalemia 09/22/2012     Priority: Medium     HTN (hypertension) 09/22/2012     Priority: Medium     Personal history of Lung cancer - right lobectomy 2006, chemo 2007 09/22/2012     Priority: Low        Past Medical History:    Past Medical History:   Diagnosis Date     COPD (chronic obstructive pulmonary disease) (H)      Heart valve disorder      Hypertension      Macular degeneration      Malignant neoplasm (H) 2006     Plantar fasciitis        Past Surgical  History:    Past Surgical History:   Procedure Laterality Date     APPENDECTOMY       BIOPSY  2006    Lung     CATARACT IOL, RT/LT  6/26/2008    Right eye     CHOLECYSTECTOMY       COLONOSCOPY       Colorectal surgery       ENT SURGERY      Tympanoplasty     HC REMV CATARACT EXTRACAP,INSERT LENS  04/23/09    Left eye     HERNIA REPAIR      Incarcerated, 2 surgeries     LASER YAG CAPSULOTOMY  7/24/2014    Procedure: LASER YAG CAPSULOTOMY;  Surgeon: Wes Mcbride MD;  Location: [facility]     Right lung - lobectomy  2006       Family History:    No family history on file.    Social History:  Marital Status:   [5]  Social History   Substance Use Topics     Smoking status: Former Smoker     Quit date: 10/12/2001     Smokeless tobacco: Not on file     Alcohol use No        Medications:      albuterol (PROAIR HFA/PROVENTIL HFA/VENTOLIN HFA) 108 (90 Base) MCG/ACT inhaler   amLODIPine (NORVASC) 5 MG tablet   aspirin 325 MG tablet   cetirizine (ZYRTEC) 10 MG tablet   desonide (DESOWEN) 0.05 % cream   docusate sodium (STOOL SOFTENER) 100 MG capsule   ferrous gluconate (FERGON) 324 (38 Fe) MG tablet   fluticasone (FLONASE) 50 MCG/ACT spray   fluticasone-salmeterol (ADVAIR DISKUS) 500-50 MCG/DOSE diskus inhaler   furosemide (LASIX) 40 MG tablet   lisinopril (PRINIVIL/ZESTRIL) 10 MG tablet   metoprolol tartrate (LOPRESSOR) 50 MG tablet   MULTIPLE VITAMIN ESSENTIAL TABS   Multiple Vitamins-Minerals (OCUVITE PRESERVISION PO)   omeprazole (PRILOSEC) 20 MG CR capsule   potassium chloride SA (K-DUR/KLOR-CON M) 10 MEQ CR tablet   tiotropium (SPIRIVA) 18 MCG capsule   triamcinolone (KENALOG) 0.1 % paste   BACTROBAN 2 % EX OINT         Review of Systems   All other systems reviewed and are negative.      Physical Exam   BP: 161/67  Heart Rate: 95  Temp: 98.9  F (37.2  C)  Resp: 20  Weight: 58.4 kg (128 lb 12.8 oz)  SpO2: 97 %      Physical Exam   Constitutional: She appears well-developed and well-nourished. No distress.    HENT:   Head: Normocephalic and atraumatic.   Right Ear: External ear normal.   Left Ear: External ear normal.   Nose: Nose normal.   Mouth/Throat: Oropharynx is clear and moist. No oropharyngeal exudate.   Eyes: Pupils are equal, round, and reactive to light. Right eye exhibits no discharge. Left eye exhibits no discharge. No scleral icterus.   Neck: Normal range of motion. Neck supple.   Cardiovascular: Normal rate, normal heart sounds and intact distal pulses.    Pulmonary/Chest: Effort normal and breath sounds normal. No respiratory distress.   Abdominal: Soft. There is no tenderness.   Musculoskeletal: Normal range of motion.   The patient has a small rope-like structure on the inside part of her left thigh. It is slightly tender to palpations. She has good pulses in her foot.    Neurological: She is alert.   Skin: Skin is warm and dry. No rash noted. She is not diaphoretic.   Psychiatric: She has a normal mood and affect. Her behavior is normal. Judgment and thought content normal.   Nursing note reviewed.          ED Course     ED Course     Procedures    Results for orders placed or performed during the hospital encounter of 09/14/18   US Lower Extremity Venous Duplex Left    Narrative    VENOUS DOPPLER LEFT LOWER EXTREMITY 9/14/2018 2:59 PM    HISTORY: Left leg pain.    COMPARISON: 7/2/2016.    FINDINGS: Color-flow imaging and Doppler waveform spectral analysis  were utilized. There is normal compressibility and spontaneous flow  throughout the left common femoral, superficial femoral, popliteal and  visualized calf veins.      Impression    IMPRESSION: No evidence for deep venous thrombosis.     Ultrasound shows no signs of a DVT.  This rash does not appear to be any type of cellulitic process.  Please see the pitcher as noted above.  Patient's platelet level is low, this could just be some petechiae from her low platelets.  I recommend that she does apply a heat pack to this area, and continue to watch  this.  Patient will follow up with her doctor early next week if things are not improving.    Assessments & Plan (with Medical Decision Making)  Nonspecific rash     I have reviewed the nursing notes.    I have reviewed the findings, diagnosis, plan and need for follow up with the patient.            This document serves as a record of services personally performed by Negro Barroso, *. It was created on their behalf by Emilie Roque, a trained medical scribe. The creation of this record is based on the provider's personal observations and the statements of the patient. This document has been checked and approved by the attending provider.  Note: Chart documentation done in part with Dragon Voice Recognition software. Although reviewed after completion, some word and grammatical errors may remain.  9/14/2018   [facility] EMERGENCY DEPARTMENT     Negro Barroso MD  09/14/18 8994

## 2018-09-14 NOTE — ED AVS SNAPSHOT
[facility] Emergency Department    [address] [place] [address]    Phone:  [phone]    Fax:  [phone]                                       Janice K Goodell   MRN: [id number]    Department:  [facility] Emergency Department   Date of Visit:  9/14/2018           After Visit Summary Signature Page     I have received my discharge instructions, and my questions have been answered. I have discussed any challenges I see with this plan with the nurse or doctor.    ..........................................................................................................................................  Patient/Patient Representative Signature      ..........................................................................................................................................  Patient Representative Print Name and Relationship to Patient    ..................................................               ................................................  Date                                   Time    ..........................................................................................................................................  Reviewed by Signature/Title    ...................................................              ..............................................  Date                                               Time          22EPIC Rev 08/18

## 2018-09-14 NOTE — ED AVS SNAPSHOT
[facility] Emergency Department    [address] [place] [address]    Phone:  [phone]    Fax:  [phone]                                       Janice K Goodell   MRN: [id number]    Department:  [facility] Emergency Department   Date of Visit:  9/14/2018           Patient Information     Date Of Birth          1935        Your diagnoses for this visit were:     Rash and nonspecific skin eruption        You were seen by Negro Barroso MD.      Follow-up Information     Schedule an appointment as soon as possible for a visit with Michael Henao    Specialty:  Family Practice    Why:  For follow up on your ED stay    Contact information:    [facility]  [address]   [place] [address]  [phone]          Discharge Instructions       1.  Apply warm packs to the tender area a few times a day over the weekend.  You could also try ice packs.  2.  Use Tylenol as needed for any discomfort.    3.  Please return to the emergency department if you do develop a fever, if you see significant spread of the redness up or down your leg.  Otherwise you should follow-up doctor next week.    24 Hour Appointment Hotline       To make an appointment at any [facility] call [phone] ([phone]). If you don't have a family doctor or clinic, we will help you find one. [facility] are conveniently located to serve the needs of you and your family.             Review of your medicines      Our records show that you are taking the medicines listed below. If these are incorrect, please call your family doctor or clinic.        Dose / Directions Last dose taken    ADVAIR DISKUS 500-50 MCG/DOSE diskus inhaler   Dose:  1 puff   Generic drug:  fluticasone-salmeterol        Inhale 1 puff into the lungs 2 times daily   Refills:  0        albuterol 108 (90 Base) MCG/ACT inhaler   Commonly known as:  PROAIR HFA/PROVENTIL HFA/VENTOLIN HFA   Dose:  2 puff         Inhale 2 puffs into the lungs 4 times daily as needed   Refills:  0        amLODIPine 5 MG tablet   Commonly known as:  NORVASC   Dose:  5 mg        Take 5 mg by mouth 2 times daily   Refills:  0        aspirin 325 MG tablet   Dose:  325 mg   Quantity:  120 tablet        Take 1 tablet (325 mg) by mouth daily   Refills:  0        BACTROBAN 2 % ointment   Generic drug:  mupirocin        in nose prn   Refills:  0        cetirizine 10 MG tablet   Commonly known as:  zyrTEC   Dose:  10 mg   Quantity:  20 tablet        Take 1 tablet by mouth 2 times daily as needed (itch, swelling of lips).   Refills:  0        desonide 0.05 % cream   Commonly known as:  DESOWEN        Apply topically as needed   Refills:  0        ferrous gluconate 324 (38 Fe) MG tablet   Commonly known as:  FERGON   Dose:  324 mg        Take 324 mg by mouth daily   Refills:  0        fluticasone 50 MCG/ACT spray   Commonly known as:  FLONASE   Dose:  1 spray        Spray 1 spray in nostril daily   Refills:  0        furosemide 40 MG tablet   Commonly known as:  LASIX   Dose:  40 mg        Take 40 mg by mouth daily   Refills:  0        lisinopril 10 MG tablet   Commonly known as:  PRINIVIL/ZESTRIL   Dose:  10 mg        Take 10 mg by mouth 2 times daily   Refills:  0        metoprolol tartrate 50 MG tablet   Commonly known as:  LOPRESSOR   Dose:  50 mg   Quantity:  60 tablet        Take 1 tablet (50 mg) by mouth 2 times daily   Refills:  0        MULTIPLE VITAMIN ESSENTIAL Tabs   Dose:  1 tablet        Take 1 tablet by mouth daily   Refills:  0        OCUVITE PRESERVISION PO   Dose:  1 tablet        Take 1 tablet by mouth 2 times daily   Refills:  0        omeprazole 20 MG CR capsule   Commonly known as:  priLOSEC   Dose:  20 mg        Take 20 mg by mouth 2 times daily   Refills:  0        potassium chloride SA 10 MEQ CR tablet   Commonly known as:  K-DUR/KLOR-CON M   Dose:  10 mEq        Take 10 mEq by mouth 2 times daily (with meals)   Refills:  0      "   STOOL SOFTENER 100 MG capsule   Dose:  100 mg   Generic drug:  docusate sodium        Take 100 mg by mouth daily   Refills:  0        tiotropium 18 MCG capsule   Commonly known as:  SPIRIVA   Dose:  18 mcg        Inhale 18 mcg into the lungs daily   Refills:  0        triamcinolone 0.1 % paste   Commonly known as:  KENALOG        2 times daily as needed   Refills:  0                Procedures and tests performed during your visit     US Lower Extremity Venous Duplex Left      Orders Needing Specimen Collection     None      Pending Results     Date and Time Order Name Status Description    9/14/2018 1422 US Lower Extremity Venous Duplex Left Preliminary             Pending Culture Results     No orders found from 9/12/2018 to 9/15/2018.            Pending Results Instructions     If you had any lab results that were not finalized at the time of your Discharge, you can call the ED Lab Result RN at [phone]. You will be contacted by this team for any positive Lab results or changes in treatment. The nurses are available 7 days a week from 10A to 6:30P.  You can leave a message 24 hours per day and they will return your call.        Thank you for choosing [facility]       Thank you for choosing [facility] for your care. Our goal is always to provide you with excellent care. Hearing back from our patients is one way we can continue to improve our services. Please take a few minutes to complete the written survey that you may receive in the mail after you visit with us. Thank you!        [employer] Information     [employer] lets you send messages to your doctor, view your test results, renew your prescriptions, schedule appointments and more. To sign up, go to [email or web address] . Click on \"Log in\" on the left side of the screen, which will take you to the Welcome page. Then click on \"Sign up Now\" on the right side of the page.     You will be asked to enter the access code listed below, as well as some personal " information. Please follow the directions to create your username and password.     Your access code is: [email or web address]  Expires: 2018 12:56 AM     Your access code will  in 90 days. If you need help or a new code, please call your [facility] clinic or [phone].        Care EveryWhere ID     This is your Care EveryWhere ID. This could be used by other organizations to access your [facility] medical records  [id number]        Equal Access to Services     [facility]ASH[facility] : Bharat croucho Sogaetano, waaxda luqadaha, qaybta kaalmada adelauren, charito aleman . So [facility] [phone].    ATENCIÓN: Si habla español, tiene a roldan disposición servicios gratuitos de asistencia lingüística. Llame al [phone].    We comply with applicable federal civil rights laws and [place] laws. We do not discriminate on the basis of race, color, national origin, age, disability, sex, sexual orientation, or gender identity.            After Visit Summary       This is your record. Keep this with you and show to your community pharmacist(s) and doctor(s) at your next visit.

## 2018-09-14 NOTE — ED NOTES
Pt states her left knee is tender and it is painful with palpation.  There is a small hard area under and area of redness on medial side of left knee.  Pt has recent diagnosis of a-fib and is not currently on a thinner.  Additionally, pt is undergoing cancer treatment, chemo.

## 2018-09-14 NOTE — DISCHARGE INSTRUCTIONS
1.  Apply warm packs to the tender area a few times a day over the weekend.  You could also try ice packs.  2.  Use Tylenol as needed for any discomfort.    3.  Please return to the emergency department if you do develop a fever, if you see significant spread of the redness up or down your leg.  Otherwise you should follow-up doctor next week.

## 2018-09-20 ENCOUNTER — TRANSFERRED RECORDS (OUTPATIENT)
Dept: HEALTH INFORMATION MANAGEMENT | Facility: CLINIC | Age: 83
End: 2018-09-20

## 2018-09-20 LAB
ALT SERPL-CCNC: 17 IU/L (ref 7–52)
AST SERPL-CCNC: 23 U/L (ref 13–39)
CREAT SERPL-MCNC: 0.88 MG/DL (ref 0.6–1.3)
GFR SERPL CREATININE-BSD FRML MDRD: 61.7 ML/MIN/1.73M2
GLUCOSE SERPL-MCNC: 88 MG/DL (ref 70–110)
POTASSIUM SERPL-SCNC: 3.9 MMOL/L (ref 3.5–5.1)

## 2018-09-24 DIAGNOSIS — C34.02 SMALL CELL CARCINOMA OF LEFT MAIN BRONCHUS (H): Primary | ICD-10-CM

## 2018-09-27 DIAGNOSIS — C34.02 SMALL CELL CARCINOMA OF LEFT MAIN BRONCHUS (H): ICD-10-CM

## 2018-09-27 LAB
BASOPHILS # BLD AUTO: 0 10E9/L (ref 0–0.2)
BASOPHILS NFR BLD AUTO: 0.2 %
DIFFERENTIAL METHOD BLD: ABNORMAL
EOSINOPHIL NFR BLD AUTO: 0.8 %
ERYTHROCYTE [DISTWIDTH] IN BLOOD BY AUTOMATED COUNT: 13.2 % (ref 10–15)
HCT VFR BLD AUTO: 24.9 % (ref 35–47)
HGB BLD-MCNC: 7.9 G/DL (ref 11.7–15.7)
IMM GRANULOCYTES # BLD: 0 10E9/L (ref 0–0.4)
IMM GRANULOCYTES NFR BLD: 0.6 %
LYMPHOCYTES # BLD AUTO: 1.1 10E9/L (ref 0.8–5.3)
LYMPHOCYTES NFR BLD AUTO: 21.5 %
MCH RBC QN AUTO: 29.5 PG (ref 26.5–33)
MCHC RBC AUTO-ENTMCNC: 31.7 G/DL (ref 31.5–36.5)
MCV RBC AUTO: 93 FL (ref 78–100)
MONOCYTES # BLD AUTO: 0.1 10E9/L (ref 0–1.3)
MONOCYTES NFR BLD AUTO: 1.4 %
NEUTROPHILS # BLD AUTO: 3.7 10E9/L (ref 1.6–8.3)
NEUTROPHILS NFR BLD AUTO: 75.5 %
NRBC # BLD AUTO: 0 10*3/UL
NRBC BLD AUTO-RTO: 0 /100
PLATELET # BLD AUTO: 355 10E9/L (ref 150–450)
PLATELET # BLD EST: ABNORMAL 10*3/UL
RBC # BLD AUTO: 2.68 10E12/L (ref 3.8–5.2)
RBC MORPH BLD: NORMAL
WBC # BLD AUTO: 4.9 10E9/L (ref 4–11)

## 2018-09-27 PROCEDURE — 85025 COMPLETE CBC W/AUTO DIFF WBC: CPT | Performed by: INTERNAL MEDICINE

## 2018-09-27 PROCEDURE — 36415 COLL VENOUS BLD VENIPUNCTURE: CPT | Performed by: INTERNAL MEDICINE

## 2018-10-18 DIAGNOSIS — C34.02 SMALL CELL CARCINOMA OF LEFT MAIN BRONCHUS (H): ICD-10-CM

## 2018-10-18 LAB
BASOPHILS # BLD AUTO: 0 10E9/L (ref 0–0.2)
BASOPHILS NFR BLD AUTO: 0 %
DIFFERENTIAL METHOD BLD: ABNORMAL
EOSINOPHIL # BLD AUTO: 0 10E9/L (ref 0–0.7)
EOSINOPHIL NFR BLD AUTO: 0 %
ERYTHROCYTE [DISTWIDTH] IN BLOOD BY AUTOMATED COUNT: 15.8 % (ref 10–15)
HCT VFR BLD AUTO: 26.1 % (ref 35–47)
HGB BLD-MCNC: 8.2 G/DL (ref 11.7–15.7)
LYMPHOCYTES # BLD AUTO: 2 10E9/L (ref 0.8–5.3)
LYMPHOCYTES NFR BLD AUTO: 14 %
MCH RBC QN AUTO: 29.4 PG (ref 26.5–33)
MCHC RBC AUTO-ENTMCNC: 31.4 G/DL (ref 31.5–36.5)
MCV RBC AUTO: 94 FL (ref 78–100)
MONOCYTES # BLD AUTO: 0.4 10E9/L (ref 0–1.3)
MONOCYTES NFR BLD AUTO: 3 %
NEUTROPHILS # BLD AUTO: 11.9 10E9/L (ref 1.6–8.3)
NEUTROPHILS NFR BLD AUTO: 83 %
PLATELET # BLD AUTO: 131 10E9/L (ref 150–450)
PLATELET # BLD EST: ABNORMAL 10*3/UL
RBC # BLD AUTO: 2.79 10E12/L (ref 3.8–5.2)
RBC MORPH BLD: NORMAL
WBC # BLD AUTO: 14.3 10E9/L (ref 4–11)

## 2018-10-18 PROCEDURE — 36415 COLL VENOUS BLD VENIPUNCTURE: CPT | Performed by: INTERNAL MEDICINE

## 2018-10-18 PROCEDURE — 85025 COMPLETE CBC W/AUTO DIFF WBC: CPT | Performed by: INTERNAL MEDICINE

## 2018-10-25 DIAGNOSIS — C34.02 SMALL CELL CARCINOMA OF LEFT MAIN BRONCHUS (H): ICD-10-CM

## 2018-10-25 LAB
ANISOCYTOSIS BLD QL SMEAR: SLIGHT
BASOPHILS # BLD AUTO: 0 10E9/L (ref 0–0.2)
BASOPHILS NFR BLD AUTO: 0 %
DIFFERENTIAL METHOD BLD: ABNORMAL
EOSINOPHIL # BLD AUTO: 0.4 10E9/L (ref 0–0.7)
EOSINOPHIL NFR BLD AUTO: 2 %
ERYTHROCYTE [DISTWIDTH] IN BLOOD BY AUTOMATED COUNT: 16.2 % (ref 10–15)
HCT VFR BLD AUTO: 25.3 % (ref 35–47)
HGB BLD-MCNC: 7.9 G/DL (ref 11.7–15.7)
LYMPHOCYTES # BLD AUTO: 2.3 10E9/L (ref 0.8–5.3)
LYMPHOCYTES NFR BLD AUTO: 11 %
MCH RBC QN AUTO: 29 PG (ref 26.5–33)
MCHC RBC AUTO-ENTMCNC: 31.2 G/DL (ref 31.5–36.5)
MCV RBC AUTO: 93 FL (ref 78–100)
METAMYELOCYTES # BLD: 1.3 10E9/L
METAMYELOCYTES NFR BLD MANUAL: 6 %
MONOCYTES # BLD AUTO: 0.2 10E9/L (ref 0–1.3)
MONOCYTES NFR BLD AUTO: 1 %
MYELOCYTES # BLD: 0.9 10E9/L
MYELOCYTES NFR BLD MANUAL: 4 %
NEUTROPHILS # BLD AUTO: 16.2 10E9/L (ref 1.6–8.3)
NEUTROPHILS NFR BLD AUTO: 76 %
PLATELET # BLD AUTO: 63 10E9/L (ref 150–450)
PLATELET # BLD EST: ABNORMAL 10*3/UL
RBC # BLD AUTO: 2.72 10E12/L (ref 3.8–5.2)
WBC # BLD AUTO: 21.3 10E9/L (ref 4–11)

## 2018-10-25 PROCEDURE — 85025 COMPLETE CBC W/AUTO DIFF WBC: CPT | Performed by: INTERNAL MEDICINE

## 2018-10-25 PROCEDURE — 36415 COLL VENOUS BLD VENIPUNCTURE: CPT | Performed by: INTERNAL MEDICINE

## 2018-11-01 DIAGNOSIS — D64.81 ANEMIA DUE TO CHEMOTHERAPY: ICD-10-CM

## 2018-11-01 DIAGNOSIS — C78.7 SECONDARY MALIGNANT NEOPLASM OF LIVER (H): ICD-10-CM

## 2018-11-01 DIAGNOSIS — T45.1X5A ANEMIA DUE TO CHEMOTHERAPY: ICD-10-CM

## 2018-11-01 DIAGNOSIS — C34.02 MALIGNANT NEOPLASM OF LEFT MAIN BRONCHUS (H): Primary | ICD-10-CM

## 2018-11-01 DIAGNOSIS — D69.59 THROMBOCYTOPENIA DUE TO DIMINISHED PLATELET PRODUCTION: ICD-10-CM

## 2018-11-01 DIAGNOSIS — C78.2 SECONDARY MALIGNANT NEOPLASM OF PLEURA (H): ICD-10-CM

## 2018-11-05 ENCOUNTER — APPOINTMENT (OUTPATIENT)
Dept: GENERAL RADIOLOGY | Facility: CLINIC | Age: 83
End: 2018-11-05
Attending: FAMILY MEDICINE
Payer: MEDICARE

## 2018-11-05 ENCOUNTER — APPOINTMENT (OUTPATIENT)
Dept: CT IMAGING | Facility: CLINIC | Age: 83
End: 2018-11-05
Attending: FAMILY MEDICINE
Payer: MEDICARE

## 2018-11-05 ENCOUNTER — HOSPITAL ENCOUNTER (EMERGENCY)
Facility: CLINIC | Age: 83
Discharge: HOME | End: 2018-11-05
Attending: FAMILY MEDICINE | Admitting: FAMILY MEDICINE
Payer: MEDICARE

## 2018-11-05 VITALS
OXYGEN SATURATION: 99 % | WEIGHT: 135 LBS | TEMPERATURE: 98.8 F | BODY MASS INDEX: 26.37 KG/M2 | SYSTOLIC BLOOD PRESSURE: 148 MMHG | DIASTOLIC BLOOD PRESSURE: 62 MMHG | HEART RATE: 115 BPM | RESPIRATION RATE: 20 BRPM

## 2018-11-05 DIAGNOSIS — R07.81 PLEURITIC CHEST PAIN: ICD-10-CM

## 2018-11-05 LAB
ALBUMIN SERPL-MCNC: 2.9 G/DL (ref 3.4–5)
ALP SERPL-CCNC: 135 U/L (ref 40–150)
ALT SERPL W P-5'-P-CCNC: 26 U/L (ref 0–50)
ANION GAP SERPL CALCULATED.3IONS-SCNC: 11 MMOL/L (ref 3–14)
AST SERPL W P-5'-P-CCNC: 15 U/L (ref 0–45)
BASE EXCESS BLDV CALC-SCNC: 8.2 MMOL/L
BASOPHILS # BLD AUTO: 0.5 10E9/L (ref 0–0.2)
BASOPHILS NFR BLD AUTO: 1 %
BILIRUB SERPL-MCNC: 0.3 MG/DL (ref 0.2–1.3)
BUN SERPL-MCNC: 37 MG/DL (ref 7–30)
CALCIUM SERPL-MCNC: 8.7 MG/DL (ref 8.5–10.1)
CHLORIDE SERPL-SCNC: 95 MMOL/L (ref 94–109)
CO2 SERPL-SCNC: 30 MMOL/L (ref 20–32)
CREAT SERPL-MCNC: 1.04 MG/DL (ref 0.52–1.04)
DIFFERENTIAL METHOD BLD: ABNORMAL
EOSINOPHIL # BLD AUTO: 0.5 10E9/L (ref 0–0.7)
EOSINOPHIL NFR BLD AUTO: 1 %
ERYTHROCYTE [DISTWIDTH] IN BLOOD BY AUTOMATED COUNT: 18.5 % (ref 10–15)
ERYTHROCYTE [SEDIMENTATION RATE] IN BLOOD BY WESTERGREN METHOD: 51 MM/H (ref 0–30)
GFR SERPL CREATININE-BSD FRML MDRD: 51 ML/MIN/1.7M2
GLUCOSE SERPL-MCNC: 157 MG/DL (ref 70–99)
HCO3 BLDV-SCNC: 34 MMOL/L (ref 21–28)
HCT VFR BLD AUTO: 26 % (ref 35–47)
HGB BLD-MCNC: 8.3 G/DL (ref 11.7–15.7)
LYMPHOCYTES # BLD AUTO: 0.9 10E9/L (ref 0.8–5.3)
LYMPHOCYTES NFR BLD AUTO: 2 %
MCH RBC QN AUTO: 30.3 PG (ref 26.5–33)
MCHC RBC AUTO-ENTMCNC: 31.9 G/DL (ref 31.5–36.5)
MCV RBC AUTO: 95 FL (ref 78–100)
MONOCYTES # BLD AUTO: 0.5 10E9/L (ref 0–1.3)
MONOCYTES NFR BLD AUTO: 1 %
NEUTROPHILS # BLD AUTO: 44.6 10E9/L (ref 1.6–8.3)
NEUTROPHILS NFR BLD AUTO: 95 %
NT-PROBNP SERPL-MCNC: 581 PG/ML (ref 0–1800)
O2/TOTAL GAS SETTING VFR VENT: 36 %
PCO2 BLDV: 57 MM HG (ref 40–50)
PH BLDV: 7.39 PH (ref 7.32–7.43)
PLATELET # BLD AUTO: 271 10E9/L (ref 150–450)
PO2 BLDV: 39 MM HG (ref 25–47)
POTASSIUM SERPL-SCNC: 3.9 MMOL/L (ref 3.4–5.3)
PROT SERPL-MCNC: 6.5 G/DL (ref 6.8–8.8)
RBC # BLD AUTO: 2.74 10E12/L (ref 3.8–5.2)
SODIUM SERPL-SCNC: 136 MMOL/L (ref 133–144)
TROPONIN I SERPL-MCNC: <0.015 UG/L (ref 0–0.04)
WBC # BLD AUTO: 47 10E9/L (ref 4–11)

## 2018-11-05 PROCEDURE — 85025 COMPLETE CBC W/AUTO DIFF WBC: CPT | Performed by: FAMILY MEDICINE

## 2018-11-05 PROCEDURE — 96361 HYDRATE IV INFUSION ADD-ON: CPT | Performed by: FAMILY MEDICINE

## 2018-11-05 PROCEDURE — 96375 TX/PRO/DX INJ NEW DRUG ADDON: CPT | Mod: 59 | Performed by: FAMILY MEDICINE

## 2018-11-05 PROCEDURE — 25000128 H RX IP 250 OP 636: Performed by: FAMILY MEDICINE

## 2018-11-05 PROCEDURE — 99285 EMERGENCY DEPT VISIT HI MDM: CPT | Mod: 25 | Performed by: FAMILY MEDICINE

## 2018-11-05 PROCEDURE — 84484 ASSAY OF TROPONIN QUANT: CPT | Performed by: FAMILY MEDICINE

## 2018-11-05 PROCEDURE — 93005 ELECTROCARDIOGRAM TRACING: CPT | Performed by: FAMILY MEDICINE

## 2018-11-05 PROCEDURE — 93010 ELECTROCARDIOGRAM REPORT: CPT | Mod: Z6 | Performed by: FAMILY MEDICINE

## 2018-11-05 PROCEDURE — 83880 ASSAY OF NATRIURETIC PEPTIDE: CPT | Performed by: FAMILY MEDICINE

## 2018-11-05 PROCEDURE — 25000125 ZZHC RX 250: Performed by: FAMILY MEDICINE

## 2018-11-05 PROCEDURE — 80053 COMPREHEN METABOLIC PANEL: CPT | Performed by: FAMILY MEDICINE

## 2018-11-05 PROCEDURE — 96374 THER/PROPH/DIAG INJ IV PUSH: CPT | Mod: 59 | Performed by: FAMILY MEDICINE

## 2018-11-05 PROCEDURE — 71045 X-RAY EXAM CHEST 1 VIEW: CPT | Mod: TC

## 2018-11-05 PROCEDURE — 82803 BLOOD GASES ANY COMBINATION: CPT | Performed by: FAMILY MEDICINE

## 2018-11-05 PROCEDURE — 85652 RBC SED RATE AUTOMATED: CPT | Performed by: FAMILY MEDICINE

## 2018-11-05 PROCEDURE — 71260 CT THORAX DX C+: CPT

## 2018-11-05 RX ORDER — IOPAMIDOL 755 MG/ML
100 INJECTION, SOLUTION INTRAVASCULAR ONCE
Status: COMPLETED | OUTPATIENT
Start: 2018-11-05 | End: 2018-11-05

## 2018-11-05 RX ORDER — OXYCODONE HYDROCHLORIDE 5 MG/1
5 TABLET ORAL EVERY 6 HOURS PRN
Qty: 12 TABLET | Refills: 0 | Status: SHIPPED | OUTPATIENT
Start: 2018-11-05 | End: 2019-06-24

## 2018-11-05 RX ORDER — HYDROMORPHONE HYDROCHLORIDE 1 MG/ML
0.5 INJECTION, SOLUTION INTRAMUSCULAR; INTRAVENOUS; SUBCUTANEOUS ONCE
Status: COMPLETED | OUTPATIENT
Start: 2018-11-05 | End: 2018-11-05

## 2018-11-05 RX ORDER — HEPARIN SODIUM (PORCINE) LOCK FLUSH IV SOLN 100 UNIT/ML 100 UNIT/ML
5 SOLUTION INTRAVENOUS ONCE
Status: COMPLETED | OUTPATIENT
Start: 2018-11-05 | End: 2018-11-05

## 2018-11-05 RX ORDER — CEFUROXIME AXETIL 500 MG/1
500 TABLET ORAL 2 TIMES DAILY
Qty: 20 TABLET | Refills: 0 | Status: SHIPPED | OUTPATIENT
Start: 2018-11-05 | End: 2019-06-24

## 2018-11-05 RX ORDER — FENTANYL CITRATE 50 UG/ML
25 INJECTION, SOLUTION INTRAMUSCULAR; INTRAVENOUS
Status: DISCONTINUED | OUTPATIENT
Start: 2018-11-05 | End: 2018-11-06 | Stop reason: HOSPADM

## 2018-11-05 RX ADMIN — SODIUM CHLORIDE, PRESERVATIVE FREE 5 ML: 5 INJECTION INTRAVENOUS at 22:49

## 2018-11-05 RX ADMIN — Medication 1 ML: at 20:00

## 2018-11-05 RX ADMIN — SODIUM CHLORIDE 250 ML: 9 INJECTION, SOLUTION INTRAVENOUS at 21:10

## 2018-11-05 RX ADMIN — FENTANYL CITRATE 25 MCG: 50 INJECTION, SOLUTION INTRAMUSCULAR; INTRAVENOUS at 20:35

## 2018-11-05 RX ADMIN — SODIUM CHLORIDE 100 ML: 9 INJECTION, SOLUTION INTRAVENOUS at 21:39

## 2018-11-05 RX ADMIN — Medication 0.5 MG: at 21:30

## 2018-11-05 RX ADMIN — IOPAMIDOL 80 ML: 755 INJECTION, SOLUTION INTRAVENOUS at 21:39

## 2018-11-05 NOTE — ED AVS SNAPSHOT
[facility] Emergency Department    [address] [place] [address]    Phone:  [phone]    Fax:  [phone]                                       Janice K Goodell   MRN: [id number]    Department:  [facility] Emergency Department   Date of Visit:  11/5/2018           Patient Information     Date Of Birth          1935        Your diagnoses for this visit were:     Pleuritic chest pain        You were seen by Christopher Lundberg MD.      Follow-up Information     Follow up with Jovanna Eagle MD. Call in 1 day.    Specialty:  Oncology    Contact information:    [facility]  [address]  [place] [address] [phone]          Follow up with [facility] Emergency Department.    Specialty:  EMERGENCY MEDICINE    Why:  If symptoms worsen    Contact information:    [address] [place] [id number] [phone]    Additional information:    From [address]: Exit at [address] on south side of [place]. Turn right on [address]. Turn left at stoplight on [address]. [facility] will be in view two blocks ahead        Discharge Instructions       Thank you for giving us the opportunity to see you. The impression is that you have pleuritic chest pain, with possible left lower lobe pneumonia versus atelectasis.    Your white blood count was markedly elevated and may be due to the Neulasta.    Contact Dr. Cat tomorrow for further follow-up later this week.  Have your complete blood count rechecked by Thursday.    The following medications were given during your stay: IV fluids, fentanyl, Dilaudid    Since you received an opiate pain medication or sedative during your visit, please do not drive for at least 8 hours.     If you had lab work, cultures or imaging studies done during your stay, the final results may still be pending. We will call you if your plan of care needs to change.     If you are not  seeing an improvement within 3-4 days, please follow up with your primary care provider or clinic.     After discharge, please closely monitor for any new or worsening symptoms. Return to the Emergency Department at any time if your symptoms worsen.        Your next 10 appointments already scheduled     Nov 08, 2018  6:30 PM CST   LAB with [facility] ([facility])    [address] [place] [id number]   [phone]           Please do not eat 10-12 hours before your appointment if you are coming in fasting for labs on lipids, cholesterol, or glucose (sugar). This does not apply to pregnant women. Water, hot tea and black coffee (with nothing added) are okay. Do not drink other fluids, diet soda or chew gum.            Nov 15, 2018  6:30 PM CST   LAB with [facility]   [facility]    [address] [place] [address]   [phone]           Please do not eat 10-12 hours before your appointment if you are coming in fasting for labs on lipids, cholesterol, or glucose (sugar). This does not apply to pregnant women. Water, hot tea and black coffee (with nothing added) are okay. Do not drink other fluids, diet soda or chew gum.            Nov 21, 2018  6:30 PM CST   LAB with [facility]   [facility]    [address] [place] [address]   [phone]           Please do not eat 10-12 hours before your appointment if you are coming in fasting for labs on lipids, cholesterol, or glucose (sugar). This does not apply to pregnant women. Water, hot tea and black coffee (with nothing added) are okay. Do not drink other fluids, diet soda or chew gum.            Nov 27, 2018 10:30 AM CST   CT CHEST/ABDOMEN/PELVIS W CONTRAST with PHCT1   [facility] CT Scan ([facility])    [address] [place] [id number]   [phone]           How  do I prepare for my exam? (Food and drink instructions) To prepare: Do not eat or drink for 2 hours before your exam. If you need to take medicine, you may take it with small sips of water. (We may ask you to take liquid medicine as well.)  How do I prepare for my exam? (Other instructions) Please arrive 30 minutes early for your CT.  Once in the department you might be asked to drink water 15-20 minutes prior to your exam.  If indicated you may be asked to drink an oral contrast in advance of your CT.  If this is the case, the imaging team will let you know or be in contact with you prior to your appointment  Patients over 70 or patients with diabetes or kidney problems: If you haven t had a blood test (creatinine test) within the last 30 days, the Cardiologist/Radiologist may require you to get this test prior to your exam.  If you have diabetes:  Continue to take your metformin medication on the day of your exam  What should I wear: Please wear loose clothing, such as a sweat suit or jogging clothes. Avoid snaps, zippers and other metal. We may ask you to undress and put on a hospital gown.  How long does the exam take: Most scans take less than 20 minutes.  What should I bring: Please bring any scans or X-rays taken at other hospitals, if similar tests were done. Also bring a list of your medicines, including vitamins, minerals and over-the-counter drugs. It is safest to leave personal items at home.  Do I need a : No  is needed.  What do I need to tell my doctor? Be sure to tell your doctor: * If you have any allergies. * If there s any chance you are pregnant. * If you are breastfeeding.  What should I do after the exam: No restrictions, You may resume normal activities.  What is this test: A CT (computed tomography) scan is a series of pictures that allows us to look inside your body. The scanner creates images of the body in cross sections, much like slices of bread. This helps us see any  problems more clearly. You may receive contrast (X-ray dye) before or during your scan. You will be asked to drink the contrast.  Who should I call with questions: If you have any questions, please call the Imaging Department where you will have your exam. Directions, parking instructions, and other information is available on our website, [email or web address].              24 Hour Appointment Hotline       To make an appointment at any [facility] clinic, call [phone] ([phone]). If you don't have a family doctor or clinic, we will help you find one. [facility] clinics are conveniently located to serve the needs of you and your family.             Review of your medicines      START taking        Dose / Directions Last dose taken    cefuroxime 500 MG tablet   Commonly known as:  CEFTIN   Dose:  500 mg   Quantity:  20 tablet        Take 1 tablet (500 mg) by mouth 2 times daily   Refills:  0        oxyCODONE IR 5 MG tablet   Commonly known as:  ROXICODONE   Dose:  5 mg   Quantity:  12 tablet        Take 1 tablet (5 mg) by mouth every 6 hours as needed for pain   Refills:  0          Our records show that you are taking the medicines listed below. If these are incorrect, please call your family doctor or clinic.        Dose / Directions Last dose taken    ADVAIR DISKUS 500-50 MCG/DOSE diskus inhaler   Dose:  1 puff   Generic drug:  fluticasone-salmeterol        Inhale 1 puff into the lungs 2 times daily   Refills:  0        albuterol 108 (90 Base) MCG/ACT inhaler   Commonly known as:  PROAIR HFA/PROVENTIL HFA/VENTOLIN HFA   Dose:  2 puff        Inhale 2 puffs into the lungs 4 times daily as needed   Refills:  0        amLODIPine 5 MG tablet   Commonly known as:  NORVASC   Dose:  5 mg        Take 5 mg by mouth 2 times daily   Refills:  0        aspirin 325 MG tablet   Dose:  325 mg   Quantity:  120 tablet        Take 1 tablet (325 mg) by mouth daily   Refills:  0        BACTROBAN 2 % ointment   Generic drug:   mupirocin        in nose prn   Refills:  0        cetirizine 10 MG tablet   Commonly known as:  zyrTEC   Dose:  10 mg   Quantity:  20 tablet        Take 1 tablet by mouth 2 times daily as needed (itch, swelling of lips).   Refills:  0        desonide 0.05 % cream   Commonly known as:  DESOWEN        Apply topically as needed   Refills:  0        ferrous gluconate 324 (38 Fe) MG tablet   Commonly known as:  FERGON   Dose:  324 mg        Take 324 mg by mouth daily   Refills:  0        fluticasone 50 MCG/ACT spray   Commonly known as:  FLONASE   Dose:  1 spray        Spray 1 spray in nostril daily   Refills:  0        furosemide 40 MG tablet   Commonly known as:  LASIX   Dose:  40 mg        Take 40 mg by mouth daily   Refills:  0        lisinopril 10 MG tablet   Commonly known as:  PRINIVIL/ZESTRIL   Dose:  10 mg        Take 10 mg by mouth 2 times daily   Refills:  0        metoprolol tartrate 50 MG tablet   Commonly known as:  LOPRESSOR   Dose:  50 mg   Quantity:  60 tablet        Take 1 tablet (50 mg) by mouth 2 times daily   Refills:  0        MULTIPLE VITAMIN ESSENTIAL Tabs   Dose:  1 tablet        Take 1 tablet by mouth daily   Refills:  0        OCUVITE PRESERVISION PO   Dose:  1 tablet        Take 1 tablet by mouth 2 times daily   Refills:  0        omeprazole 20 MG CR capsule   Commonly known as:  priLOSEC   Dose:  20 mg        Take 20 mg by mouth 2 times daily   Refills:  0        potassium chloride SA 10 MEQ CR tablet   Commonly known as:  K-DUR/KLOR-CON M   Dose:  10 mEq        Take 10 mEq by mouth 2 times daily (with meals)   Refills:  0        STOOL SOFTENER 100 MG capsule   Dose:  100 mg   Generic drug:  docusate sodium        Take 100 mg by mouth daily   Refills:  0        tiotropium 18 MCG capsule   Commonly known as:  SPIRIVA   Dose:  18 mcg        Inhale 18 mcg into the lungs daily   Refills:  0        triamcinolone 0.1 % paste   Commonly known as:  KENALOG        2 times daily as needed   Refills:  0                 Information about OPIOIDS     PRESCRIPTION OPIOIDS: WHAT YOU NEED TO KNOW   We gave you an opioid (narcotic) pain medicine. It is important to manage your pain, but opioids are not always the best choice. You should first try all the other options your care team gave you. Take this medicine for as short a time (and as few doses) as possible.    Some activities can increase your pain, such as bandage changes or therapy sessions. It may help to take your pain medicine 30 to 60 minutes before these activities. Reduce your stress by getting enough sleep, working on hobbies you enjoy and practicing relaxation or meditation. Talk to your care team about ways to manage your pain beyond prescription opioids.    These medicines have risks:    DO NOT drive when on new or higher doses of pain medicine. These medicines can affect your alertness and reaction times, and you could be arrested for driving under the influence (DUI). If you need to use opioids long-term, talk to your care team about driving.    DO NOT operate heavy machinery    DO NOT do any other dangerous activities while taking these medicines.    DO NOT drink any alcohol while taking these medicines.     If the opioid prescribed includes acetaminophen, DO NOT take with any other medicines that contain acetaminophen. Read all labels carefully. Look for the word  acetaminophen  or  Tylenol.  Ask your pharmacist if you have questions or are unsure.    You can get addicted to pain medicines, especially if you have a history of addiction (chemical, alcohol or substance dependence). Talk to your care team about ways to reduce this risk.    All opioids tend to cause constipation. Drink plenty of water and eat foods that have a lot of fiber, such as fruits, vegetables, prune juice, apple juice and high-fiber cereal. Take a laxative (Miralax, milk of magnesia, Colace, Senna) if you don t move your bowels at least every other day. Other side effects include  upset stomach, sleepiness, dizziness, throwing up, tolerance (needing more of the medicine to have the same effect), physical dependence and slowed breathing.    Store your pills in a secure place, locked if possible. We will not replace any lost or stolen medicine. If you don t finish your medicine, please throw away (dispose) as directed by your pharmacist. The [place] [employer] has more information about safe disposal: https://www.pca.[facility].[place]/living-green/managing-unwanted-medications        Prescriptions were sent or printed at these locations (2 Prescriptions)                   [facility] [place] [facility] - [place], [place] - [address]   [address] [address]    Telephone:  [phone]   Fax:  [phone]   Hours:                  E-Prescribed (1 of 2)         cefuroxime (CEFTIN) 500 MG tablet                 Printed at Department/Unit printer (1 of 2)         oxyCODONE IR (ROXICODONE) 5 MG tablet                Procedures and tests performed during your visit     Blood gas venous    CBC with platelets differential    CT Chest Pulmonary Embolism w Contrast    Comprehensive metabolic panel    EKG 12-lead, tracing only    Erythrocyte sedimentation rate auto    Nt probnp inpatient (BNP)    Troponin I    XR Chest Port 1 View      Orders Needing Specimen Collection     None      Pending Results     Date and Time Order Name Status Description    11/5/2018 2108 CT Chest Pulmonary Embolism w Contrast Preliminary             Pending Culture Results     No orders found from 11/3/2018 to 11/6/2018.            Pending Results Instructions     If you had any lab results that were not finalized at the time of your Discharge, you can call the ED Lab Result RN at [phone]. You will be contacted by this team for any positive Lab results or changes in treatment. The nurses are available 7 days a week from 10A to 6:30P.  You can leave a message 24 hours per day and they will  return your call.        Thank you for choosing [facility]       Thank you for choosing [facility] for your care. Our goal is always to provide you with excellent care. Hearing back from our patients is one way we can continue to improve our services. Please take a few minutes to complete the written survey that you may receive in the mail after you visit with us. Thank you!        Care EveryWhere ID     This is your Care EveryWhere ID. This could be used by other organizations to access your [facility] medical records  [id number]        Equal Access to Services     VARINDER VARGHESE : Hadii jhonathan croucho Sogaetano, waaxda luqadaha, qaybta kaalmada adelauren, charito vasquez. [facility] [phone].    ATENCIÓN: Si habla español, tiene a roldan disposición servicios gratuitos de asistencia lingüística. Ll[facility] [phone].    We comply with applicable federal civil rights laws and [place] laws. We do not discriminate on the basis of race, color, national origin, age, disability, sex, sexual orientation, or gender identity.            After Visit Summary       This is your record. Keep this with you and show to your community pharmacist(s) and doctor(s) at your next visit.

## 2018-11-05 NOTE — ED AVS SNAPSHOT
[facility] Emergency Department    [address] [place] [address]    Phone:  [phone]    Fax:  [phone]                                       Janice K Goodell   MRN: [id number]    Department:  [facility] Emergency Department   Date of Visit:  11/5/2018           After Visit Summary Signature Page     I have received my discharge instructions, and my questions have been answered. I have discussed any challenges I see with this plan with the nurse or doctor.    ..........................................................................................................................................  Patient/Patient Representative Signature      ..........................................................................................................................................  Patient Representative Print Name and Relationship to Patient    ..................................................               ................................................  Date                                   Time    ..........................................................................................................................................  Reviewed by Signature/Title    ...................................................              ..............................................  Date                                               Time          22EPIC Rev 08/18

## 2018-11-06 NOTE — ED TRIAGE NOTES
"Patient with L sided \"sharp\" constant CP starting approximately 15 minutes ago. Hx A-Fib, on Xarelto. Patient reports this pain feels nothing like when she is in A-Fib. Denies nausea/SOB.   "

## 2018-11-06 NOTE — ED PROVIDER NOTES
[facility] ED Provider Note   CC:     Chief Complaint   Patient presents with     Chest Pain     HPI:  Janice K Goodell is a 82 year old female who presented to the emergency department with acute onset of left parasternal pleuritic chest pain that started approximately 45 minutes ago.  Patient had been in the kitchen, not doing anything out of the ordinary, when she developed onset of the pain.  She has never had this type of pain before and it seems to be associated with deep breathing.  Pain level was initially 10/10, now down to 8/10.  She denies radiation of the pain into the neck, jaw or arms and does not have any diaphoresis, or nausea.  She has a history of chronic atrial fibrillation and had noticed earlier in the evening that her heart seemed to be racing slightly.  She has not had any increasing shortness of breath, cough, fever, chills.  Patient recently completed her 12th chemotherapy treatment for stage IV small cell lung cancer, and had a transfusion of packed red blood cells last Friday.  She had a Neulasta shot today.  She has had previous right lobectomy in 2006 and chemotherapy in 2007.  She is on home oxygen at 3 L/min due to her underlying lung cancer and COPD. Patient states that she recently saw her cardiologist, had a negative stress test, and was started on rivaroxaban.  She is on amlodipine, omeprazole, metoprolol, lisinopril, Lasix, Spiriva, omeprazole and full dose aspirin.  She has not noticed any unilateral calf pain or ankle swelling.  She denies any prior history of myocardial infarction or congestive heart failure.    Problem List:  Patient Active Problem List    Diagnosis     community acquired pneumonia     Anemia     COPD (chronic obstructive pulmonary disease) (H)     CKD (chronic kidney disease) stage 3, GFR 30-59 ml/min (H)     Hypokalemia     HTN (hypertension)     Personal history of Lung cancer - right  lobectomy 2006, chemo 2007       MEDS:   Previous Medications    ALBUTEROL (PROAIR HFA/PROVENTIL HFA/VENTOLIN HFA) 108 (90 BASE) MCG/ACT INHALER    Inhale 2 puffs into the lungs 4 times daily as needed    AMLODIPINE (NORVASC) 5 MG TABLET    Take 5 mg by mouth 2 times daily    ASPIRIN 325 MG TABLET    Take 1 tablet (325 mg) by mouth daily    BACTROBAN 2 % EX OINT    in nose prn    CETIRIZINE (ZYRTEC) 10 MG TABLET    Take 1 tablet by mouth 2 times daily as needed (itch, swelling of lips).    DESONIDE (DESOWEN) 0.05 % CREAM    Apply topically as needed    DOCUSATE SODIUM (STOOL SOFTENER) 100 MG CAPSULE    Take 100 mg by mouth daily    FERROUS GLUCONATE (FERGON) 324 (38 FE) MG TABLET    Take 324 mg by mouth daily    FLUTICASONE (FLONASE) 50 MCG/ACT SPRAY    Spray 1 spray in nostril daily    FLUTICASONE-SALMETEROL (ADVAIR DISKUS) 500-50 MCG/DOSE DISKUS INHALER    Inhale 1 puff into the lungs 2 times daily    FUROSEMIDE (LASIX) 40 MG TABLET    Take 40 mg by mouth daily    LISINOPRIL (PRINIVIL/ZESTRIL) 10 MG TABLET    Take 10 mg by mouth 2 times daily    METOPROLOL TARTRATE (LOPRESSOR) 50 MG TABLET    Take 1 tablet (50 mg) by mouth 2 times daily    MULTIPLE VITAMIN ESSENTIAL TABS    Take 1 tablet by mouth daily    MULTIPLE VITAMINS-MINERALS (OCUVITE PRESERVISION PO)    Take 1 tablet by mouth 2 times daily    OMEPRAZOLE (PRILOSEC) 20 MG CR CAPSULE    Take 20 mg by mouth 2 times daily    POTASSIUM CHLORIDE SA (K-DUR/KLOR-CON M) 10 MEQ CR TABLET    Take 10 mEq by mouth 2 times daily (with meals)    TIOTROPIUM (SPIRIVA) 18 MCG CAPSULE    Inhale 18 mcg into the lungs daily    TRIAMCINOLONE (KENALOG) 0.1 % PASTE    2 times daily as needed       ALLERGIES:    Allergies   Allergen Reactions     Morphine Sulfate      Amoxicillin      Tongue felt fat- no rash, no SOB     Doxycycline Swelling     Levaquin      Tongue felt fat- no rash, no SOB,     Re-trial of Levaquin made 9/2012 with angioedema reaction to lips.     Sulfa Drugs       Tongue felt fat- no rash, no SOB     Thiazide-Type Diuretics      Zithromax [Azithromycin Dihydrate]      Tongue felt fat- no rash, no SOB       Past Surgical History:   Procedure Laterality Date     APPENDECTOMY       BIOPSY  2006    Lung     CATARACT IOL, RT/LT  6/26/2008    Right eye     CHOLECYSTECTOMY       COLONOSCOPY       Colorectal surgery       ENT SURGERY      Tympanoplasty     HC REMV CATARACT EXTRACAP,INSERT LENS  04/23/09    Left eye     HERNIA REPAIR      Incarcerated, 2 surgeries     LASER YAG CAPSULOTOMY  7/24/2014    Procedure: LASER YAG CAPSULOTOMY;  Surgeon: Wes Mcbride MD;  Location: [facility] OR     Right lung - lobectomy  2006       Social History   Substance Use Topics     Smoking status: Former Smoker     Quit date: 10/12/2001     Smokeless tobacco: Not on file     Alcohol use No         Review of Systems   Except as noted in HPI, all other systems were reviewed and are negative    Physical Exam     Vitals were reviewed  Patient Vitals for the past 8 hrs:   BP Temp Temp src Pulse Heart Rate Resp SpO2 Weight   11/05/18 2130 - - - - 79 19 99 % -   11/05/18 2105 - - - - 81 18 99 % -   11/05/18 2100 - - - - 83 19 99 % -   11/05/18 1945 - - - - 112 (!) 31 99 % -   11/05/18 1941 149/64 98.8  F (37.1  C) Oral 115 115 20 96 % 61.2 kg (135 lb)     GENERAL APPEARANCE: Alert, moderate distress due to sharp pleuritic chest pain; patient is on oxygen by nasal cannula  FACE: normal facies  EYES: Pupils are equal  HENT: normal external exam  NECK: no adenopathy or asymmetry  CHEST: Slight left parasternal chest wall tenderness  RESP: Normal respiratory effort, patient is taking shallow breaths to avoid triggering the sharp pains  CV: Slightly rapid rate, regular rhythm; no appreciable murmurs  ABD: soft, no tenderness; no rebound or guarding; bowel sounds are normal  MS: no gross deformities noted; normal muscle tone.  EXT: No calf tenderness or pitting edema  SKIN: no worrisome rash  NEURO: no facial  droop; no focal deficits, speech is normal        Available Lab/Imaging Results     Results for orders placed or performed during the hospital encounter of 11/05/18 (from the past 24 hour(s))   XR Chest Port 1 View    Narrative    XR PORTABLE CHEST ONE VIEW   11/5/2018 8:11 PM     HISTORY: Pleuritic chest pain.    COMPARISON: None.      Impression    IMPRESSION: Chest CT from 5/15/2018. Heart size is normal.  MediPort-type catheter in place. Pulmonary vasculature is not  distended. No pleural fluid or definite focal infiltrates seen.    JAGJIT JASSO MD   CBC with platelets differential   Result Value Ref Range    WBC 47.0 (H) 4.0 - 11.0 10e9/L    RBC Count 2.74 (L) 3.8 - 5.2 10e12/L    Hemoglobin 8.3 (L) 11.7 - 15.7 g/dL    Hematocrit 26.0 (L) 35.0 - 47.0 %    MCV 95 78 - 100 fl    MCH 30.3 26.5 - 33.0 pg    MCHC 31.9 31.5 - 36.5 g/dL    RDW 18.5 (H) 10.0 - 15.0 %    Platelet Count 271 150 - 450 10e9/L    Diff Method Automated Method     % Neutrophils 95.0 %    % Lymphocytes 2.0 %    % Monocytes 1.0 %    % Eosinophils 1.0 %    % Basophils 1.0 %    Absolute Neutrophil 44.6 (H) 1.6 - 8.3 10e9/L    Absolute Lymphocytes 0.9 0.8 - 5.3 10e9/L    Absolute Monocytes 0.5 0.0 - 1.3 10e9/L    Absolute Eosinophils 0.5 0.0 - 0.7 10e9/L    Absolute Basophils 0.5 (H) 0.0 - 0.2 10e9/L   Comprehensive metabolic panel   Result Value Ref Range    Sodium 136 133 - 144 mmol/L    Potassium 3.9 3.4 - 5.3 mmol/L    Chloride 95 94 - 109 mmol/L    Carbon Dioxide 30 20 - 32 mmol/L    Anion Gap 11 3 - 14 mmol/L    Glucose 157 (H) 70 - 99 mg/dL    Urea Nitrogen 37 (H) 7 - 30 mg/dL    Creatinine 1.04 0.52 - 1.04 mg/dL    GFR Estimate 51 (L) >60 mL/min/1.7m2    GFR Estimate If Black 62 >60 mL/min/1.7m2    Calcium 8.7 8.5 - 10.1 mg/dL    Bilirubin Total 0.3 0.2 - 1.3 mg/dL    Albumin 2.9 (L) 3.4 - 5.0 g/dL    Protein Total 6.5 (L) 6.8 - 8.8 g/dL    Alkaline Phosphatase 135 40 - 150 U/L    ALT 26 0 - 50 U/L    AST 15 0 - 45 U/L   Troponin I    Result Value Ref Range    Troponin I ES <0.015 0.000 - 0.045 ug/L   Blood gas venous   Result Value Ref Range    Ph Venous 7.39 7.32 - 7.43 pH    PCO2 Venous 57 (H) 40 - 50 mm Hg    PO2 Venous 39 25 - 47 mm Hg    Bicarbonate Venous 34 (H) 21 - 28 mmol/L    Base Excess Venous 8.2 mmol/L    FIO2 36    Nt probnp inpatient (BNP)   Result Value Ref Range    N-Terminal Pro BNP Inpatient 581 0 - 1800 pg/mL   Erythrocyte sedimentation rate auto   Result Value Ref Range    Sed Rate 51 (H) 0 - 30 mm/h   CT Chest Pulmonary Embolism w Contrast    Narrative    CT CHEST PULMONARY EMBOLISM WITH CONTRAST   11/5/2018 9:56 PM     HISTORY: Acute pleuritic chest pain. History of lung cancer with mets.  Status post chemotherapy, acute pleuritic chest pain.      TECHNIQUE: 80 mL IsoVue 370. Radiation dose for this scan was reduced  using automated exposure control, adjustment of the mA and/or kV  according to patient size, or iterative reconstruction technique.    COMPARISON: Chest CT from 6 months ago on 5/15/2018.    FINDINGS:  No evidence of pulmonary embolus. No evidence of aortic  aneurysm or dissection.    Emphysematous changes bilaterally in the upper lobes primarily. New  area of moderate left lower lobe atelectasis or infiltrate/pneumonia.  Moderate left and mild right hilar adenopathy. Patchy 0.8 cm  ill-defined somewhat nodular opacity in the posterior left upper lobe  axial series 8 image 67. This is new since the prior study. This  appears inflammatory in nature. It has a somewhat nodular appearance,  however.    There appears to be some pleural calcification anteriorly on the right  that was seen on the prior study. Mild right base atelectasis.    Atherosclerotic changes of aorta without evidence of aneurysm. Upper  abdominal images demonstrate no evidence of adrenal lesion.      Impression    IMPRESSION:  1. Emphysematous changes in the upper lobes bilaterally.  2. New area of left lower lobe atelectasis or  infiltrate/pneumonia.  3. Moderate left and mild right hilar adenopathy.  4. Patchy 0.8 cm ill-defined somewhat nodular opacity in the left  upper lobe. This is new since the prior study. It appears inflammatory  in nature. It does, however, have a nodular component and a short-term  follow-up CT scan is recommended.  5. No evidence of pulmonary embolus.   6. The large nodular density in the left lower lobe on the prior CT  scan is no longer visible.       EKG reviewed by me: Sinus tachycardia with heart rate of 113.  Normal VT, QT and QRS intervals.  Normal axis.  Old inferior infarct, poor R wave progression.  No acute ST-T wave changes.  No significant change compared with previous EKG from September 2018.           Impression     Final diagnoses:   Pleuritic chest pain         ED Course & Medical Decision Making   Janice K Goodell is a 82 year old female who presented to the emergency department with acute onset of left parasternal pleuritic chest pain that started approximately 45 minutes to an hour prior to arrival.  There is no associated trauma or recent illness.  She recently completed her 12th chemotherapy session for her stage IV small cell lung cancer.  She was given a Neulasta shot today.  Patient was concerned about the possibility of a pulmonary embolus or pneumonia.  She has never had a previous PE or DVT, and is reportedly on rivaroxaban for chronic A. fib..  Patient was seen shortly after arrival.  Vital signs were stable with a temperature of 98.8, blood pressure 149/64.  Initial heart rate was 115 and respiratory rate of 20 with 96% oxygen saturation.  Initial EKG revealed a sinus tachycardia with out any signs of atrial fibrillation.  There were no acute or ischemic findings.  The patient's exam revealed some mild chest wall tenderness, no epigastric tenderness, clear breath sounds, and no muffled heart sounds or murmurs.  CBC reveals a white blood count of 47.0, with 95% neutrophils.   Hemoglobin is 8.3 and platelet counts 271.  Her previous white blood count was 21.3 on October 25.  Comprehensive metabolic panel reveals a glucose of 157, BUN of 37, creatinine 1.04.  Troponin is less than 0.015, , sed rate of 51.  Venous blood gas reveals a pH of 7.39, PCO2 of 57, PO2 of 39.  Initial chest x-ray was personally reviewed by me and revealed no acute findings.  After discussing the risks and benefits of CT imaging including the risk of additional radiation, we decided to proceed with a CT scan of the chest to rule out pulmonary embolism, increased metastases or tumor, versus pneumonia that is not seen on the chest x-ray.  CT scan preliminary report reveals emphysematous changes in the upper lobes bilaterally.  There is a new area of left lower lobe atelectasis versus infiltrate.  There is a patchy 0.8 cm ill-defined nodular opacities in the left upper lobe which is new since the prior study.  There is no evidence for pulmonary embolus.  The large nodular density in the left lower lobe on the prior CT scan is no longer visible.  I conveyed these results findings to the patient and her daughter and provided copies of the preliminary imaging report along with her laboratory studies today.  Patient received IV fluids, fentanyl and Dilaudid but it did not seem to help alleviate her pleuritic type pain.  Patient will begin Ceftin 500 mg twice a day for 10 days to treat for possible left lower lobe pneumonia, and oxycodone to take for breakthrough pain at home.  Her elevated white blood count may be due to the effects of the Neulasta, and I will have the patient contact Dr. Eagle, her oncologist, tomorrow for further follow-up.  She usually has a blood panel checked on Thursdays.  Monitor for any new or worsening symptoms and return to the ED at any time if this occurs.  Patient and his daughter expressed understanding of our follow-up plan.           Written after-visit summary and instructions were  given at the time of discharge.      New Prescriptions    CEFUROXIME (CEFTIN) 500 MG TABLET    Take 1 tablet (500 mg) by mouth 2 times daily    OXYCODONE IR (ROXICODONE) 5 MG TABLET    Take 1 tablet (5 mg) by mouth every 6 hours as needed for pain            This note was dictated using electronic voice recognition software and although reviewed, may contain grammatical and spelling errors.        Christopher Lundberg MD  11/05/18 [id number]

## 2018-11-06 NOTE — DISCHARGE INSTRUCTIONS
Thank you for giving us the opportunity to see you. The impression is that you have pleuritic chest pain, with possible left lower lobe pneumonia versus atelectasis.    Your white blood count was markedly elevated and may be due to the Neulasta.    Contact Dr. Cat tomorrow for further follow-up later this week.  Have your complete blood count rechecked by Thursday.    The following medications were given during your stay: IV fluids, fentanyl, Dilaudid    Since you received an opiate pain medication or sedative during your visit, please do not drive for at least 8 hours.     If you had lab work, cultures or imaging studies done during your stay, the final results may still be pending. We will call you if your plan of care needs to change.     If you are not seeing an improvement within 3-4 days, please follow up with your primary care provider or clinic.     After discharge, please closely monitor for any new or worsening symptoms. Return to the Emergency Department at any time if your symptoms worsen.

## 2018-11-08 DIAGNOSIS — D64.81 ANEMIA DUE TO CHEMOTHERAPY: ICD-10-CM

## 2018-11-08 DIAGNOSIS — C78.7 SECONDARY MALIGNANT NEOPLASM OF LIVER (H): ICD-10-CM

## 2018-11-08 DIAGNOSIS — C34.02 MALIGNANT NEOPLASM OF LEFT MAIN BRONCHUS (H): ICD-10-CM

## 2018-11-08 DIAGNOSIS — T45.1X5A ANEMIA DUE TO CHEMOTHERAPY: ICD-10-CM

## 2018-11-08 DIAGNOSIS — D69.59 THROMBOCYTOPENIA DUE TO DIMINISHED PLATELET PRODUCTION: ICD-10-CM

## 2018-11-08 DIAGNOSIS — C78.2 SECONDARY MALIGNANT NEOPLASM OF PLEURA (H): ICD-10-CM

## 2018-11-08 LAB
ANISOCYTOSIS BLD QL SMEAR: ABNORMAL
BASOPHILS # BLD AUTO: 0 10E9/L (ref 0–0.2)
BASOPHILS NFR BLD AUTO: 0 %
DIFFERENTIAL METHOD BLD: ABNORMAL
EOSINOPHIL # BLD AUTO: 0 10E9/L (ref 0–0.7)
EOSINOPHIL NFR BLD AUTO: 0 %
ERYTHROCYTE [DISTWIDTH] IN BLOOD BY AUTOMATED COUNT: 18.8 % (ref 10–15)
HCT VFR BLD AUTO: 25.4 % (ref 35–47)
HGB BLD-MCNC: 7.8 G/DL (ref 11.7–15.7)
LYMPHOCYTES # BLD AUTO: 1 10E9/L (ref 0.8–5.3)
LYMPHOCYTES NFR BLD AUTO: 6 %
MCH RBC QN AUTO: 29.7 PG (ref 26.5–33)
MCHC RBC AUTO-ENTMCNC: 30.7 G/DL (ref 31.5–36.5)
MCV RBC AUTO: 97 FL (ref 78–100)
MONOCYTES # BLD AUTO: 1.1 10E9/L (ref 0–1.3)
MONOCYTES NFR BLD AUTO: 7 %
NEUTROPHILS # BLD AUTO: 13.2 10E9/L (ref 1.6–8.3)
NEUTROPHILS NFR BLD AUTO: 83 %
OTHER CELLS # BLD MANUAL: 0.6 10E9/L
OTHER CELLS NFR BLD MANUAL: 4 %
PLATELET # BLD AUTO: 122 10E9/L (ref 150–450)
RBC # BLD AUTO: 2.63 10E12/L (ref 3.8–5.2)
WBC # BLD AUTO: 15.9 10E9/L (ref 4–11)

## 2018-11-08 PROCEDURE — 85025 COMPLETE CBC W/AUTO DIFF WBC: CPT | Performed by: NURSE PRACTITIONER

## 2018-11-08 PROCEDURE — 36415 COLL VENOUS BLD VENIPUNCTURE: CPT | Performed by: NURSE PRACTITIONER

## 2018-11-15 DIAGNOSIS — D64.81 ANEMIA DUE TO CHEMOTHERAPY: ICD-10-CM

## 2018-11-15 DIAGNOSIS — C78.2 SECONDARY MALIGNANT NEOPLASM OF PLEURA (H): ICD-10-CM

## 2018-11-15 DIAGNOSIS — T45.1X5A ANEMIA DUE TO CHEMOTHERAPY: ICD-10-CM

## 2018-11-15 DIAGNOSIS — C34.02 MALIGNANT NEOPLASM OF LEFT MAIN BRONCHUS (H): ICD-10-CM

## 2018-11-15 DIAGNOSIS — C78.7 SECONDARY MALIGNANT NEOPLASM OF LIVER (H): ICD-10-CM

## 2018-11-15 DIAGNOSIS — D69.59 THROMBOCYTOPENIA DUE TO DIMINISHED PLATELET PRODUCTION: ICD-10-CM

## 2018-11-15 LAB
BASOPHILS # BLD AUTO: 0.1 10E9/L (ref 0–0.2)
BASOPHILS NFR BLD AUTO: 0.4 %
DIFFERENTIAL METHOD BLD: ABNORMAL
EOSINOPHIL NFR BLD AUTO: 3.5 %
ERYTHROCYTE [DISTWIDTH] IN BLOOD BY AUTOMATED COUNT: 17.9 % (ref 10–15)
HCT VFR BLD AUTO: 32.2 % (ref 35–47)
HGB BLD-MCNC: 9.9 G/DL (ref 11.7–15.7)
IMM GRANULOCYTES # BLD: 0.4 10E9/L (ref 0–0.4)
IMM GRANULOCYTES NFR BLD: 2 %
LYMPHOCYTES # BLD AUTO: 2.2 10E9/L (ref 0.8–5.3)
LYMPHOCYTES NFR BLD AUTO: 10.4 %
MCH RBC QN AUTO: 29.6 PG (ref 26.5–33)
MCHC RBC AUTO-ENTMCNC: 30.7 G/DL (ref 31.5–36.5)
MCV RBC AUTO: 96 FL (ref 78–100)
MONOCYTES # BLD AUTO: 2 10E9/L (ref 0–1.3)
MONOCYTES NFR BLD AUTO: 9.2 %
NEUTROPHILS # BLD AUTO: 16.1 10E9/L (ref 1.6–8.3)
NEUTROPHILS NFR BLD AUTO: 74.5 %
NRBC # BLD AUTO: 0 10*3/UL
NRBC BLD AUTO-RTO: 0 /100
PLATELET # BLD AUTO: 163 10E9/L (ref 150–450)
RBC # BLD AUTO: 3.34 10E12/L (ref 3.8–5.2)
WBC # BLD AUTO: 21.5 10E9/L (ref 4–11)

## 2018-11-15 PROCEDURE — 36415 COLL VENOUS BLD VENIPUNCTURE: CPT | Performed by: NURSE PRACTITIONER

## 2018-11-15 PROCEDURE — 85025 COMPLETE CBC W/AUTO DIFF WBC: CPT | Performed by: NURSE PRACTITIONER

## 2018-11-21 DIAGNOSIS — D69.59 THROMBOCYTOPENIA DUE TO DIMINISHED PLATELET PRODUCTION: ICD-10-CM

## 2018-11-21 DIAGNOSIS — D64.81 ANEMIA DUE TO CHEMOTHERAPY: ICD-10-CM

## 2018-11-21 DIAGNOSIS — C78.2 SECONDARY MALIGNANT NEOPLASM OF PLEURA (H): ICD-10-CM

## 2018-11-21 DIAGNOSIS — T45.1X5A ANEMIA DUE TO CHEMOTHERAPY: ICD-10-CM

## 2018-11-21 DIAGNOSIS — C78.7 SECONDARY MALIGNANT NEOPLASM OF LIVER (H): ICD-10-CM

## 2018-11-21 DIAGNOSIS — C34.02 MALIGNANT NEOPLASM OF LEFT MAIN BRONCHUS (H): ICD-10-CM

## 2018-11-21 LAB
BASOPHILS # BLD AUTO: 0.1 10E9/L (ref 0–0.2)
BASOPHILS NFR BLD AUTO: 0.4 %
DIFFERENTIAL METHOD BLD: ABNORMAL
EOSINOPHIL NFR BLD AUTO: 4.8 %
ERYTHROCYTE [DISTWIDTH] IN BLOOD BY AUTOMATED COUNT: 17.2 % (ref 10–15)
HCT VFR BLD AUTO: 30.6 % (ref 35–47)
HGB BLD-MCNC: 9.6 G/DL (ref 11.7–15.7)
IMM GRANULOCYTES # BLD: 0.1 10E9/L (ref 0–0.4)
IMM GRANULOCYTES NFR BLD: 0.5 %
LYMPHOCYTES # BLD AUTO: 2.5 10E9/L (ref 0.8–5.3)
LYMPHOCYTES NFR BLD AUTO: 13.8 %
MCH RBC QN AUTO: 29.4 PG (ref 26.5–33)
MCHC RBC AUTO-ENTMCNC: 31.4 G/DL (ref 31.5–36.5)
MCV RBC AUTO: 94 FL (ref 78–100)
MONOCYTES # BLD AUTO: 1.8 10E9/L (ref 0–1.3)
MONOCYTES NFR BLD AUTO: 9.9 %
NEUTROPHILS # BLD AUTO: 13 10E9/L (ref 1.6–8.3)
NEUTROPHILS NFR BLD AUTO: 70.6 %
NRBC # BLD AUTO: 0 10*3/UL
NRBC BLD AUTO-RTO: 0 /100
PLATELET # BLD AUTO: 364 10E9/L (ref 150–450)
RBC # BLD AUTO: 3.26 10E12/L (ref 3.8–5.2)
WBC # BLD AUTO: 18.4 10E9/L (ref 4–11)

## 2018-11-21 PROCEDURE — 36415 COLL VENOUS BLD VENIPUNCTURE: CPT | Performed by: NURSE PRACTITIONER

## 2018-11-21 PROCEDURE — 85025 COMPLETE CBC W/AUTO DIFF WBC: CPT | Performed by: NURSE PRACTITIONER

## 2018-11-27 ENCOUNTER — HOSPITAL ENCOUNTER (OUTPATIENT)
Dept: CT IMAGING | Facility: CLINIC | Age: 83
Discharge: HOME | End: 2018-11-27
Admitting: RADIOLOGY
Payer: MEDICARE

## 2018-11-27 DIAGNOSIS — C34.90 SMALL CELL LUNG CANCER (H): ICD-10-CM

## 2018-11-27 PROCEDURE — 25000125 ZZHC RX 250: Performed by: RADIOLOGY

## 2018-11-27 PROCEDURE — 25000128 H RX IP 250 OP 636: Performed by: RADIOLOGY

## 2018-11-27 PROCEDURE — 74177 CT ABD & PELVIS W/CONTRAST: CPT

## 2018-11-27 RX ORDER — IOPAMIDOL 755 MG/ML
500 INJECTION, SOLUTION INTRAVASCULAR ONCE
Status: COMPLETED | OUTPATIENT
Start: 2018-11-27 | End: 2018-11-27

## 2018-11-27 RX ADMIN — IOPAMIDOL 70 ML: 755 INJECTION, SOLUTION INTRAVENOUS at 10:47

## 2018-11-27 RX ADMIN — SODIUM CHLORIDE 70 ML: 9 INJECTION, SOLUTION INTRAVENOUS at 10:46

## 2019-01-09 ENCOUNTER — INFUSION THERAPY VISIT (OUTPATIENT)
Dept: INFUSION THERAPY | Facility: CLINIC | Age: 84
End: 2019-01-09
Attending: INTERNAL MEDICINE
Payer: MEDICARE

## 2019-01-09 ENCOUNTER — HOSPITAL ENCOUNTER (OUTPATIENT)
Dept: CT IMAGING | Facility: CLINIC | Age: 84
Discharge: HOME | End: 2019-01-09
Attending: INTERNAL MEDICINE | Admitting: INTERNAL MEDICINE
Payer: MEDICARE

## 2019-01-09 DIAGNOSIS — C34.90 NON-SMALL CELL CARCINOMA OF LUNG (H): ICD-10-CM

## 2019-01-09 DIAGNOSIS — C34.90 LUNG CANCER (H): Primary | ICD-10-CM

## 2019-01-09 PROCEDURE — 74177 CT ABD & PELVIS W/CONTRAST: CPT

## 2019-01-09 PROCEDURE — 96523 IRRIG DRUG DELIVERY DEVICE: CPT

## 2019-01-09 PROCEDURE — 71260 CT THORAX DX C+: CPT

## 2019-01-09 PROCEDURE — 25000128 H RX IP 250 OP 636

## 2019-01-09 PROCEDURE — 25000128 H RX IP 250 OP 636: Performed by: RADIOLOGY

## 2019-01-09 RX ORDER — HEPARIN SODIUM (PORCINE) LOCK FLUSH IV SOLN 100 UNIT/ML 100 UNIT/ML
500 SOLUTION INTRAVENOUS EVERY 8 HOURS
Status: COMPLETED | OUTPATIENT
Start: 2019-01-09 | End: 2019-01-09

## 2019-01-09 RX ORDER — IOPAMIDOL 755 MG/ML
500 INJECTION, SOLUTION INTRAVASCULAR ONCE
Status: COMPLETED | OUTPATIENT
Start: 2019-01-09 | End: 2019-01-09

## 2019-01-09 RX ORDER — HEPARIN SODIUM (PORCINE) LOCK FLUSH IV SOLN 100 UNIT/ML 100 UNIT/ML
500 SOLUTION INTRAVENOUS EVERY 8 HOURS
Status: CANCELLED
Start: 2019-01-09

## 2019-01-09 RX ADMIN — Medication 500 UNITS: at 15:44

## 2019-01-09 RX ADMIN — IOPAMIDOL 65 ML: 755 INJECTION, SOLUTION INTRAVENOUS at 13:48

## 2019-01-09 NOTE — PROGRESS NOTES
Pt here for power port access prior to CT.  Pt ret'd post CT for de-access and was discharged in stable condition.

## 2019-01-21 ENCOUNTER — HOSPITAL ENCOUNTER (EMERGENCY)
Facility: CLINIC | Age: 84
Discharge: HOME | End: 2019-01-22
Attending: FAMILY MEDICINE | Admitting: FAMILY MEDICINE
Payer: MEDICARE

## 2019-01-21 DIAGNOSIS — I10 HYPERTENSION, POOR CONTROL: ICD-10-CM

## 2019-01-21 LAB
ANION GAP SERPL CALCULATED.3IONS-SCNC: 5 MMOL/L (ref 3–14)
BASOPHILS # BLD AUTO: 0 10E9/L (ref 0–0.2)
BASOPHILS NFR BLD AUTO: 0.2 %
BUN SERPL-MCNC: 22 MG/DL (ref 7–30)
CALCIUM SERPL-MCNC: 9 MG/DL (ref 8.5–10.1)
CHLORIDE SERPL-SCNC: 96 MMOL/L (ref 94–109)
CO2 SERPL-SCNC: 37 MMOL/L (ref 20–32)
CREAT SERPL-MCNC: 0.91 MG/DL (ref 0.52–1.04)
DIFFERENTIAL METHOD BLD: ABNORMAL
EOSINOPHIL NFR BLD AUTO: 2.8 %
ERYTHROCYTE [DISTWIDTH] IN BLOOD BY AUTOMATED COUNT: 14.3 % (ref 10–15)
GFR SERPL CREATININE-BSD FRML MDRD: 59 ML/MIN/{1.73_M2}
GLUCOSE SERPL-MCNC: 106 MG/DL (ref 70–99)
HCT VFR BLD AUTO: 35.6 % (ref 35–47)
HGB BLD-MCNC: 11.1 G/DL (ref 11.7–15.7)
IMM GRANULOCYTES # BLD: 0 10E9/L (ref 0–0.4)
IMM GRANULOCYTES NFR BLD: 0.3 %
LYMPHOCYTES # BLD AUTO: 1.8 10E9/L (ref 0.8–5.3)
LYMPHOCYTES NFR BLD AUTO: 17.1 %
MCH RBC QN AUTO: 29.9 PG (ref 26.5–33)
MCHC RBC AUTO-ENTMCNC: 31.2 G/DL (ref 31.5–36.5)
MCV RBC AUTO: 96 FL (ref 78–100)
MONOCYTES # BLD AUTO: 1 10E9/L (ref 0–1.3)
MONOCYTES NFR BLD AUTO: 9.4 %
NEUTROPHILS # BLD AUTO: 7.5 10E9/L (ref 1.6–8.3)
NEUTROPHILS NFR BLD AUTO: 70.2 %
NRBC # BLD AUTO: 0 10*3/UL
NRBC BLD AUTO-RTO: 0 /100
PLATELET # BLD AUTO: 175 10E9/L (ref 150–450)
POTASSIUM SERPL-SCNC: 3.2 MMOL/L (ref 3.4–5.3)
RBC # BLD AUTO: 3.71 10E12/L (ref 3.8–5.2)
SODIUM SERPL-SCNC: 138 MMOL/L (ref 133–144)
TROPONIN I SERPL-MCNC: <0.015 UG/L (ref 0–0.04)
WBC # BLD AUTO: 10.6 10E9/L (ref 4–11)

## 2019-01-21 PROCEDURE — 93005 ELECTROCARDIOGRAM TRACING: CPT | Performed by: FAMILY MEDICINE

## 2019-01-21 PROCEDURE — 80048 BASIC METABOLIC PNL TOTAL CA: CPT | Performed by: FAMILY MEDICINE

## 2019-01-21 PROCEDURE — 99284 EMERGENCY DEPT VISIT MOD MDM: CPT | Performed by: FAMILY MEDICINE

## 2019-01-21 PROCEDURE — 85025 COMPLETE CBC W/AUTO DIFF WBC: CPT | Performed by: FAMILY MEDICINE

## 2019-01-21 PROCEDURE — 36415 COLL VENOUS BLD VENIPUNCTURE: CPT | Performed by: FAMILY MEDICINE

## 2019-01-21 PROCEDURE — 84484 ASSAY OF TROPONIN QUANT: CPT | Performed by: FAMILY MEDICINE

## 2019-01-21 PROCEDURE — 93010 ELECTROCARDIOGRAM REPORT: CPT | Mod: Z6 | Performed by: FAMILY MEDICINE

## 2019-01-21 PROCEDURE — 99284 EMERGENCY DEPT VISIT MOD MDM: CPT | Mod: 25 | Performed by: FAMILY MEDICINE

## 2019-01-21 PROCEDURE — 25000132 ZZH RX MED GY IP 250 OP 250 PS 637: Mod: GY | Performed by: FAMILY MEDICINE

## 2019-01-21 PROCEDURE — A9270 NON-COVERED ITEM OR SERVICE: HCPCS | Mod: GY | Performed by: FAMILY MEDICINE

## 2019-01-21 RX ORDER — METOPROLOL TARTRATE 50 MG
50 TABLET ORAL ONCE
Status: COMPLETED | OUTPATIENT
Start: 2019-01-21 | End: 2019-01-21

## 2019-01-21 RX ORDER — CLONIDINE HYDROCHLORIDE 0.1 MG/1
0.1 TABLET ORAL ONCE
Status: COMPLETED | OUTPATIENT
Start: 2019-01-21 | End: 2019-01-21

## 2019-01-21 RX ADMIN — METOPROLOL TARTRATE 50 MG: 50 TABLET ORAL at 23:16

## 2019-01-21 RX ADMIN — CLONIDINE HYDROCHLORIDE 0.1 MG: 0.1 TABLET ORAL at 23:10

## 2019-01-21 ASSESSMENT — ENCOUNTER SYMPTOMS
ABDOMINAL PAIN: 1
HEADACHES: 1
PALPITATIONS: 0

## 2019-01-21 NOTE — ED AVS SNAPSHOT
[facility] Emergency Department  [address] [place] [address]  Phone:  [phone]  Fax:  [phone]                                    Janice K Goodell   MRN: [id number]    Department:  [facility] Emergency Department   Date of Visit:  1/21/2019           After Visit Summary Signature Page    I have received my discharge instructions, and my questions have been answered. I have discussed any challenges I see with this plan with the nurse or doctor.    ..........................................................................................................................................  Patient/Patient Representative Signature      ..........................................................................................................................................  Patient Representative Print Name and Relationship to Patient    ..................................................               ................................................  Date                                   Time    ..........................................................................................................................................  Reviewed by Signature/Title    ...................................................              ..............................................  Date                                               Time          22EPIC Rev 08/18

## 2019-01-22 VITALS
HEART RATE: 79 BPM | TEMPERATURE: 97.1 F | RESPIRATION RATE: 50 BRPM | OXYGEN SATURATION: 99 % | DIASTOLIC BLOOD PRESSURE: 69 MMHG | SYSTOLIC BLOOD PRESSURE: 182 MMHG

## 2019-01-22 NOTE — ED PROVIDER NOTES
"  History     Chief Complaint   Patient presents with     Hypertension     HPI  Janice K Goodell is a 83 year old female with PMH of HTN, SCLC, and recent pneumonia hospitalization, who presents with a one day history of elevated blood pressure. Last month pt has a 2 week long hospital admission for CAP. During that hospitalization pts metropolol was discontinued and pt was placed on high dose of carvedilol which she continued after discharge. Pts PCP on follow-up urged pt to clarify dosage of carvedilol and this past Friday her cardiologist discontinued carvedilol and restarted her on her former dose of metropolol which pt started taking Sunday morning. Pt noticed this morning that she felt \"off and with a mild headache\", was having interment epigastric pain, and her self-recorded blood pressures remained elevated 200's/. Pt reports taking all of her daytime and evening medications as scheduled without improvement of symptoms. She denies fever, chills, nausea or vomiting.        Allergies:  Allergies   Allergen Reactions     Morphine Sulfate      Amoxicillin      Tongue felt fat- no rash, no SOB     Doxycycline Swelling     Levaquin      Tongue felt fat- no rash, no SOB,     Re-trial of Levaquin made 9/2012 with angioedema reaction to lips.     Sulfa Drugs      Tongue felt fat- no rash, no SOB     Thiazide-Type Diuretics      Zithromax [Azithromycin Dihydrate]      Tongue felt fat- no rash, no SOB       Problem List:    Patient Active Problem List    Diagnosis Date Noted     community acquired pneumonia 09/22/2012     Priority: High     Anemia 09/23/2012     Priority: Medium     COPD (chronic obstructive pulmonary disease) (H) 09/22/2012     Priority: Medium     CKD (chronic kidney disease) stage 3, GFR 30-59 ml/min (H) 09/22/2012     Priority: Medium     Hypokalemia 09/22/2012     Priority: Medium     HTN (hypertension) 09/22/2012     Priority: Medium     Personal history of Lung cancer - right lobectomy " , chemo 2007 2012     Priority: Low        Past Medical History:    Past Medical History:   Diagnosis Date     COPD (chronic obstructive pulmonary disease) (H)      Heart valve disorder      Hypertension      Macular degeneration      Malignant neoplasm (H) 2006     Plantar fasciitis        Past Surgical History:    Past Surgical History:   Procedure Laterality Date     APPENDECTOMY       BIOPSY  2006    Lung     CATARACT IOL, RT/LT  2008    Right eye     CHOLECYSTECTOMY       COLONOSCOPY       Colorectal surgery       ENT SURGERY      Tympanoplasty     HC REMV CATARACT EXTRACAP,INSERT LENS  09    Left eye     HERNIA REPAIR      Incarcerated, 2 surgeries     LASER YAG CAPSULOTOMY  2014    Procedure: LASER YAG CAPSULOTOMY;  Surgeon: Wes Mcbride MD;  Location: [facility] OR     Right lung - lobectomy  2006       Family History:    No family history on file.    Social History:  Marital Status:   [5]  Social History     Tobacco Use     Smoking status: Former Smoker     Last attempt to quit: 10/12/2001     Years since quittin.2   Substance Use Topics     Alcohol use: No     Drug use: No        Medications:      albuterol (PROAIR HFA/PROVENTIL HFA/VENTOLIN HFA) 108 (90 Base) MCG/ACT inhaler   amLODIPine (NORVASC) 5 MG tablet   aspirin 325 MG tablet   BACTROBAN 2 % EX OINT   cefuroxime (CEFTIN) 500 MG tablet   cetirizine (ZYRTEC) 10 MG tablet   desonide (DESOWEN) 0.05 % cream   docusate sodium (STOOL SOFTENER) 100 MG capsule   ferrous gluconate (FERGON) 324 (38 Fe) MG tablet   fluticasone (FLONASE) 50 MCG/ACT spray   fluticasone-salmeterol (ADVAIR DISKUS) 500-50 MCG/DOSE diskus inhaler   furosemide (LASIX) 40 MG tablet   lisinopril (PRINIVIL/ZESTRIL) 10 MG tablet   metoprolol tartrate (LOPRESSOR) 50 MG tablet   MULTIPLE VITAMIN ESSENTIAL TABS   Multiple Vitamins-Minerals (OCUVITE PRESERVISION PO)   omeprazole (PRILOSEC) 20 MG CR capsule   oxyCODONE IR (ROXICODONE) 5 MG tablet    potassium chloride SA (K-DUR/KLOR-CON M) 10 MEQ CR tablet   tiotropium (SPIRIVA) 18 MCG capsule   triamcinolone (KENALOG) 0.1 % paste         Review of Systems   Cardiovascular: Positive for chest pain. Negative for palpitations.   Gastrointestinal: Positive for abdominal pain.   Neurological: Positive for headaches.   All other systems reviewed and are negative.      Physical Exam   BP: (!) 214/101(regular cuff, R arm)  Pulse: 80  Heart Rate: 85  Temp: 97.1  F (36.2  C)  Resp: 20  SpO2: 99 %      Physical Exam   Constitutional: She is oriented to person, place, and time. She appears well-developed and well-nourished. No distress.   HENT:   Head: Normocephalic and atraumatic.   Nose: Nose normal.   Eyes: Conjunctivae and EOM are normal. Pupils are equal, round, and reactive to light.   Neck: Normal range of motion. Neck supple. No JVD present.   Cardiovascular: Normal rate, regular rhythm and normal heart sounds. Exam reveals no gallop and no friction rub.   No murmur heard.  Pulmonary/Chest: Effort normal. No stridor. No respiratory distress. She has no wheezes. She exhibits no tenderness.   Course crackles in LL lobe. Likely from resolving pneumonia   Abdominal: Soft. Bowel sounds are normal. She exhibits no distension and no mass. There is no tenderness. There is no rebound and no guarding. No hernia.   Musculoskeletal: She exhibits no edema, tenderness or deformity.   Lymphadenopathy:     She has no cervical adenopathy.   Neurological: She is alert and oriented to person, place, and time. No cranial nerve deficit.   Skin: Skin is warm and dry. Capillary refill takes less than 2 seconds. No rash noted. She is not diaphoretic. No erythema. No pallor.   Psychiatric: She has a normal mood and affect. Her behavior is normal. Judgment and thought content normal.       ED Course        Procedures               Critical Care time:  none               Results for orders placed or performed during the hospital encounter  of 01/21/19 (from the past 24 hour(s))   Basic metabolic panel   Result Value Ref Range    Sodium 138 133 - 144 mmol/L    Potassium 3.2 (L) 3.4 - 5.3 mmol/L    Chloride 96 94 - 109 mmol/L    Carbon Dioxide 37 (H) 20 - 32 mmol/L    Anion Gap 5 3 - 14 mmol/L    Glucose 106 (H) 70 - 99 mg/dL    Urea Nitrogen 22 7 - 30 mg/dL    Creatinine 0.91 0.52 - 1.04 mg/dL    GFR Estimate 59 (L) >60 mL/min/[1.73_m2]    GFR Estimate If Black 68 >60 mL/min/[1.73_m2]    Calcium 9.0 8.5 - 10.1 mg/dL   CBC with platelets differential   Result Value Ref Range    WBC 10.6 4.0 - 11.0 10e9/L    RBC Count 3.71 (L) 3.8 - 5.2 10e12/L    Hemoglobin 11.1 (L) 11.7 - 15.7 g/dL    Hematocrit 35.6 35.0 - 47.0 %    MCV 96 78 - 100 fl    MCH 29.9 26.5 - 33.0 pg    MCHC 31.2 (L) 31.5 - 36.5 g/dL    RDW 14.3 10.0 - 15.0 %    Platelet Count 175 150 - 450 10e9/L    Diff Method Automated Method     % Neutrophils 70.2 %    % Lymphocytes 17.1 %    % Monocytes 9.4 %    % Eosinophils 2.8 %    % Basophils 0.2 %    % Immature Granulocytes 0.3 %    Nucleated RBCs 0 0 /100    Absolute Neutrophil 7.5 1.6 - 8.3 10e9/L    Absolute Lymphocytes 1.8 0.8 - 5.3 10e9/L    Absolute Monocytes 1.0 0.0 - 1.3 10e9/L    Absolute Basophils 0.0 0.0 - 0.2 10e9/L    Abs Immature Granulocytes 0.0 0 - 0.4 10e9/L    Absolute Nucleated RBC 0.0    Troponin I   Result Value Ref Range    Troponin I ES <0.015 0.000 - 0.045 ug/L     EKG reviewed by me: Normal sinus rhythm.  Borderline first-degree block with KY interval of 202 ms.  Normal QT and QRS intervals.  There is poor R wave progression with precordial T wave inversion in lead V2.  There is no significant change compared with previous EKG from November 2018.      Medications   cloNIDine (CATAPRES) tablet 0.1 mg (0.1 mg Oral Given 1/21/19 2310)   metoprolol tartrate (LOPRESSOR) tablet 50 mg (50 mg Oral Given 1/21/19 2316)       Assessments & Plan (with Medical Decision Making): Janice K Goodell is a 83 year old who presented to the  emergency department with poorly controlled hypertension.  Patient was recently hospitalized with pneumonia.  Her medications were recently changed, and then changed back.  She was on metoprolol, changed to Coreg in the hospital, and recently changed back to taking metoprolol.  She is also on diltiazem and lisinopril.  She has a allergy to thiazide.  She is on Lasix daily.  Patient blood pressure was markedly elevated today, and she was experiencing some chest pain.  She also has a mild headache.  Several blood pressures were taken at home and they were in the range of 200/100.  She states that her previous blood pressures have been in the range of 130/70-80.  The patient was seen shortly after arrival.  Initial blood pressure was 214/101, and repeat blood pressures were still markedly elevated in the range of 200/100.  Exam was unremarkable.  Patient's EKG was reassuring.  Laboratory workup reveals a troponin level of less than 0.015.  Basic metabolic panel reveals a potassium level of 3.2, and glucose of 106.  Creatinine is 0.91 and urea nitrogen is 22.  There is no signs of endorgan damage, but her blood pressures poorly controlled presently on metoprolol, diltiazem and lisinopril.  Patient was given a dose of clonidine 0.1 mg orally along with a extra dose of metoprolol.  Her blood pressure improved to a systolic blood pressure of 180/90.  Patient appears to have asymptomatic hypertension.  Her potassium level slightly low but she is on Lasix and I asked her to increase her potassium supplementation for a couple of days.  I asked the patient to follow-up with her cardiologist tomorrow to discuss further management of her hypertension.  Continue to monitor the blood pressures a couple of times a day.  Return to the ED at any time if symptoms worsen.     I have reviewed the nursing notes.    I have reviewed the findings, diagnosis, plan and need for follow up with the patient.          Medication List      There  are no discharge medications for this visit.         Final diagnoses:   Hypertension, poor control       1/21/2019   [facility] EMERGENCY DEPARTMENT     Christopher Lundberg MD  01/22/19 0614

## 2019-01-22 NOTE — DISCHARGE INSTRUCTIONS
Your blood pressure improved slightly with Catapres and an extra dose of metoprolol.    Your blood work was reassuring.    Contact your cardiologist tomorrow regarding further recommendations for your blood pressure management.    Return to the emergency department at any time if your symptoms worsen.

## 2019-02-09 ENCOUNTER — HOSPITAL ENCOUNTER (EMERGENCY)
Facility: CLINIC | Age: 84
Discharge: HOME | End: 2019-02-09
Attending: FAMILY MEDICINE | Admitting: FAMILY MEDICINE
Payer: MEDICARE

## 2019-02-09 VITALS
WEIGHT: 127 LBS | DIASTOLIC BLOOD PRESSURE: 69 MMHG | RESPIRATION RATE: 20 BRPM | BODY MASS INDEX: 24.8 KG/M2 | HEART RATE: 77 BPM | OXYGEN SATURATION: 100 % | TEMPERATURE: 98.3 F | SYSTOLIC BLOOD PRESSURE: 147 MMHG

## 2019-02-09 DIAGNOSIS — I10 BENIGN ESSENTIAL HYPERTENSION: ICD-10-CM

## 2019-02-09 PROCEDURE — A9270 NON-COVERED ITEM OR SERVICE: HCPCS | Mod: GY | Performed by: FAMILY MEDICINE

## 2019-02-09 PROCEDURE — 25000132 ZZH RX MED GY IP 250 OP 250 PS 637: Mod: GY | Performed by: FAMILY MEDICINE

## 2019-02-09 PROCEDURE — 99283 EMERGENCY DEPT VISIT LOW MDM: CPT | Performed by: FAMILY MEDICINE

## 2019-02-09 PROCEDURE — 99284 EMERGENCY DEPT VISIT MOD MDM: CPT | Mod: Z6 | Performed by: FAMILY MEDICINE

## 2019-02-09 RX ORDER — DILTIAZEM HYDROCHLORIDE 240 MG/1
240 CAPSULE, EXTENDED RELEASE ORAL DAILY
Qty: 5 CAPSULE | Refills: 0 | Status: SHIPPED | OUTPATIENT
Start: 2019-02-09 | End: 2019-02-14

## 2019-02-09 RX ORDER — ACETAMINOPHEN 325 MG/1
975 TABLET ORAL ONCE
Status: COMPLETED | OUTPATIENT
Start: 2019-02-09 | End: 2019-02-09

## 2019-02-09 RX ORDER — CLONIDINE HYDROCHLORIDE 0.1 MG/1
0.2 TABLET ORAL ONCE
Status: COMPLETED | OUTPATIENT
Start: 2019-02-09 | End: 2019-02-09

## 2019-02-09 RX ADMIN — ACETAMINOPHEN 975 MG: 325 TABLET ORAL at 01:42

## 2019-02-09 RX ADMIN — CLONIDINE HYDROCHLORIDE 0.2 MG: 0.1 TABLET ORAL at 02:06

## 2019-02-09 NOTE — ED PROVIDER NOTES
"  History     Chief Complaint   Patient presents with     Hypertension     HPI  Janice K Goodell is a 83 year old female who presents with concerns of an elevated blood pressure.  Patient has been dealing with this off and on over the past month.  Patient was just here last week for the same problem had a negative workup.  Patient did follow back up with her cardiologist where they increased her metoprolol to 200 mg twice a day.  She called today and because her blood pressures are still elevated they were going to have her increase her diltiazem but she did not get this message.  She got concerned because this evening she started noticing some trouble with her vision, it seemed like things were somewhat cloudy and hard to focus.  She is also been having a headache off and on today.  Patient denies any extremity numbness or weakness, denies any chest pain or heaviness in her chest.        Summary of cardiology phone note:  Telephone Encounter - McGrath, Katie Corinne, PA - 02/08/2019 4:37 PM CST  Would increase diltiazem to 240 mg daily and follow BP. If still not controlled, would add hydralazine. Do not recommend catapres given her age.     Katie Corinne McGrath, PA .................... 2/8/2019 4:38 PM      Back to top of Miscellaneous Notes  Telephone Encounter - Lady Dumont RN - 02/08/2019 2:30 PM CST  Received call from patient with an update on AM/ PM blood pressure readings;    2/1 ... 144/57, 70 .. 115/65, 76  2/2 ... Missed am .. 157/63, 74  2/3 ... Missed am .. 137/63, 73  2/4 ... 120/57, 72 .. 139/65, 77    2/5 ... Missed am .. 205/89, 75 :hour later 199/82, 74 :hour later 182/80, 75 - didn't feel right; Headache, eyes are typically blurry (macular degeneration) but \"bother her\" with BP that high    2/6 ... 140/60, 68 .. 155/58, 70    2/7 ... Missed am .. Didn't feel right around 3pm 207/84 :hour later 179/77, 87 : hour later 179/74    2/8 ... 152/61, 74    Patient states she feels like she " remembers taking catapres in the past and having a good response; also note this was given to her in the most recent ED visit. Writer reviewed medication list; up to date.    Writer reviewed signs/ symptoms of stroke; numbness/ tingling to one side of body, confusion, balance issues; reviewed to call 911.    Routed to MERLIN Maciel.    Lady Dumont RN .................... 2/8/2019 2:50 PM           Allergies:  Allergies   Allergen Reactions     Morphine Sulfate      Amoxicillin      Tongue felt fat- no rash, no SOB     Doxycycline Swelling     Levaquin      Tongue felt fat- no rash, no SOB,     Re-trial of Levaquin made 9/2012 with angioedema reaction to lips.     Sulfa Drugs      Tongue felt fat- no rash, no SOB     Thiazide-Type Diuretics      Zithromax [Azithromycin Dihydrate]      Tongue felt fat- no rash, no SOB       Problem List:    Patient Active Problem List    Diagnosis Date Noted     community acquired pneumonia 09/22/2012     Priority: High     Anemia 09/23/2012     Priority: Medium     COPD (chronic obstructive pulmonary disease) (H) 09/22/2012     Priority: Medium     CKD (chronic kidney disease) stage 3, GFR 30-59 ml/min (H) 09/22/2012     Priority: Medium     Hypokalemia 09/22/2012     Priority: Medium     HTN (hypertension) 09/22/2012     Priority: Medium     Personal history of Lung cancer - right lobectomy 2006, chemo 2007 09/22/2012     Priority: Low        Past Medical History:    Past Medical History:   Diagnosis Date     COPD (chronic obstructive pulmonary disease) (H)      Heart valve disorder      Hypertension      Macular degeneration      Malignant neoplasm (H) 2006     Plantar fasciitis        Past Surgical History:    Past Surgical History:   Procedure Laterality Date     APPENDECTOMY       BIOPSY  2006    Lung     CATARACT IOL, RT/LT  6/26/2008    Right eye     CHOLECYSTECTOMY       COLONOSCOPY       Colorectal surgery       ENT SURGERY      Tympanoplasty     HC REMV CATARACT  EXTRACAP,INSERT LENS  09    Left eye     HERNIA REPAIR      Incarcerated, 2 surgeries     LASER YAG CAPSULOTOMY  2014    Procedure: LASER YAG CAPSULOTOMY;  Surgeon: Wes Mcbride MD;  Location: [facility] OR     Right lung - lobectomy         Family History:    History reviewed. No pertinent family history.    Social History:  Marital Status:   [5]  Social History     Tobacco Use     Smoking status: Former Smoker     Last attempt to quit: 10/12/2001     Years since quittin.3   Substance Use Topics     Alcohol use: No     Drug use: No        Medications:      lisinopril (PRINIVIL/ZESTRIL) 10 MG tablet   metoprolol tartrate (LOPRESSOR) 50 MG tablet   albuterol (PROAIR HFA/PROVENTIL HFA/VENTOLIN HFA) 108 (90 Base) MCG/ACT inhaler   amLODIPine (NORVASC) 5 MG tablet   aspirin 325 MG tablet   BACTROBAN 2 % EX OINT   cefuroxime (CEFTIN) 500 MG tablet   cetirizine (ZYRTEC) 10 MG tablet   desonide (DESOWEN) 0.05 % cream   docusate sodium (STOOL SOFTENER) 100 MG capsule   ferrous gluconate (FERGON) 324 (38 Fe) MG tablet   fluticasone (FLONASE) 50 MCG/ACT spray   fluticasone-salmeterol (ADVAIR DISKUS) 500-50 MCG/DOSE diskus inhaler   furosemide (LASIX) 40 MG tablet   MULTIPLE VITAMIN ESSENTIAL TABS   Multiple Vitamins-Minerals (OCUVITE PRESERVISION PO)   omeprazole (PRILOSEC) 20 MG CR capsule   oxyCODONE IR (ROXICODONE) 5 MG tablet   potassium chloride SA (K-DUR/KLOR-CON M) 10 MEQ CR tablet   tiotropium (SPIRIVA) 18 MCG capsule   triamcinolone (KENALOG) 0.1 % paste         Review of Systems   All other systems reviewed and are negative.      Physical Exam   BP: 193/87  Pulse: 87  Heart Rate: 87  Temp: 98.3  F (36.8  C)  Resp: 20  Weight: 57.6 kg (127 lb)  SpO2: 93 %      Physical Exam   Constitutional: She is oriented to person, place, and time. She appears well-developed and well-nourished. No distress.   HENT:   Head: Normocephalic and atraumatic.   Nose: Nose normal.   Mouth/Throat: Oropharynx  is clear and moist. No oropharyngeal exudate.   Eyes: Conjunctivae are normal.   Neck: Normal range of motion. Neck supple.   Cardiovascular: Normal rate, regular rhythm and normal heart sounds. Exam reveals no friction rub.   No murmur heard.  Pulmonary/Chest: Effort normal and breath sounds normal. No stridor. No respiratory distress. She has no wheezes. She has no rales.   Abdominal: Soft. Bowel sounds are normal. She exhibits no distension and no mass. There is no tenderness. There is no guarding.   Musculoskeletal: Normal range of motion. She exhibits no tenderness.   Neurological: She is alert and oriented to person, place, and time.   Skin: Skin is warm and dry. Capillary refill takes less than 2 seconds. She is not diaphoretic. No erythema.   Psychiatric: Judgment normal.   Nursing note and vitals reviewed.      ED Course        Procedures          Medications   cloNIDine (CATAPRES) tablet 0.2 mg (not administered)   acetaminophen (TYLENOL) tablet 975 mg (not administered)     Patient's blood pressure is improved after the above medications were given.  She is monitored here for about 2 hours and had no further symptoms.  Agree with the patient's cardiologist in increasing the diltiazem up to 240 mg daily.  Also agree with them that if this does not improve, adding hydralazine would be a good option.  I also agree that clonidine is not a good long-term option for her to have at home.  So I did write her a new prescription for her diltiazem and she was not able to pick this up yet.  I told her to contact her cardiologist on Monday for recheck on her blood pressure.    Assessments & Plan (with Medical Decision Making)  Hypertension     I have reviewed the nursing notes.    I have reviewed the findings, diagnosis, plan and need for follow up with the patient.         Medication List      Started    diltiazem  MG 24 hr ER beaded capsule  Commonly known as:  TIAZAC  240 mg, Oral, DAILY            Final  diagnoses:   Benign essential hypertension       2/9/2019   [facility] EMERGENCY DEPARTMENT     Negro Barroso MD  02/09/19 033

## 2019-02-09 NOTE — ED AVS SNAPSHOT
[facility] Emergency Department  [address] [place] [address]  Phone:  [phone]  Fax:  [phone]                                    Janice K Goodell   MRN: [id number]    Department:  [facility] Emergency Department   Date of Visit:  2/9/2019           After Visit Summary Signature Page    I have received my discharge instructions, and my questions have been answered. I have discussed any challenges I see with this plan with the nurse or doctor.    ..........................................................................................................................................  Patient/Patient Representative Signature      ..........................................................................................................................................  Patient Representative Print Name and Relationship to Patient    ..................................................               ................................................  Date                                   Time    ..........................................................................................................................................  Reviewed by Signature/Title    ...................................................              ..............................................  Date                                               Time          22EPIC Rev 08/18

## 2019-02-09 NOTE — ED TRIAGE NOTES
Patient arrives via EMS d/t elevated BP readings at home. C/O headache and blurry vision at times.

## 2019-02-25 ENCOUNTER — HOSPITAL ENCOUNTER (EMERGENCY)
Facility: CLINIC | Age: 84
Discharge: HOME | End: 2019-02-25
Attending: FAMILY MEDICINE | Admitting: FAMILY MEDICINE
Payer: MEDICARE

## 2019-02-25 VITALS
DIASTOLIC BLOOD PRESSURE: 73 MMHG | HEART RATE: 82 BPM | OXYGEN SATURATION: 99 % | BODY MASS INDEX: 24.8 KG/M2 | TEMPERATURE: 98.1 F | SYSTOLIC BLOOD PRESSURE: 189 MMHG | WEIGHT: 127 LBS | RESPIRATION RATE: 16 BRPM

## 2019-02-25 DIAGNOSIS — I10 BENIGN ESSENTIAL HYPERTENSION: ICD-10-CM

## 2019-02-25 LAB
ALBUMIN SERPL-MCNC: 3.2 G/DL (ref 3.4–5)
ALP SERPL-CCNC: 130 U/L (ref 40–150)
ALT SERPL W P-5'-P-CCNC: 22 U/L (ref 0–50)
ANION GAP SERPL CALCULATED.3IONS-SCNC: 6 MMOL/L (ref 3–14)
AST SERPL W P-5'-P-CCNC: 24 U/L (ref 0–45)
BASOPHILS # BLD AUTO: 0 10E9/L (ref 0–0.2)
BASOPHILS NFR BLD AUTO: 0.3 %
BILIRUB SERPL-MCNC: 0.2 MG/DL (ref 0.2–1.3)
BUN SERPL-MCNC: 28 MG/DL (ref 7–30)
CALCIUM SERPL-MCNC: 8.8 MG/DL (ref 8.5–10.1)
CHLORIDE SERPL-SCNC: 97 MMOL/L (ref 94–109)
CO2 SERPL-SCNC: 33 MMOL/L (ref 20–32)
CREAT SERPL-MCNC: 0.82 MG/DL (ref 0.52–1.04)
DIFFERENTIAL METHOD BLD: ABNORMAL
EOSINOPHIL NFR BLD AUTO: 3.6 %
ERYTHROCYTE [DISTWIDTH] IN BLOOD BY AUTOMATED COUNT: 14.1 % (ref 10–15)
GFR SERPL CREATININE-BSD FRML MDRD: 67 ML/MIN/{1.73_M2}
GLUCOSE SERPL-MCNC: 105 MG/DL (ref 70–99)
HCT VFR BLD AUTO: 32.3 % (ref 35–47)
HGB BLD-MCNC: 10.2 G/DL (ref 11.7–15.7)
IMM GRANULOCYTES # BLD: 0 10E9/L (ref 0–0.4)
IMM GRANULOCYTES NFR BLD: 0.1 %
LYMPHOCYTES # BLD AUTO: 1.7 10E9/L (ref 0.8–5.3)
LYMPHOCYTES NFR BLD AUTO: 19.3 %
MCH RBC QN AUTO: 29.5 PG (ref 26.5–33)
MCHC RBC AUTO-ENTMCNC: 31.6 G/DL (ref 31.5–36.5)
MCV RBC AUTO: 93 FL (ref 78–100)
MONOCYTES # BLD AUTO: 0.9 10E9/L (ref 0–1.3)
MONOCYTES NFR BLD AUTO: 10.1 %
NEUTROPHILS # BLD AUTO: 5.8 10E9/L (ref 1.6–8.3)
NEUTROPHILS NFR BLD AUTO: 66.6 %
NRBC # BLD AUTO: 0 10*3/UL
NRBC BLD AUTO-RTO: 0 /100
NT-PROBNP SERPL-MCNC: 867 PG/ML (ref 0–1800)
PLATELET # BLD AUTO: 185 10E9/L (ref 150–450)
POTASSIUM SERPL-SCNC: 4 MMOL/L (ref 3.4–5.3)
PROT SERPL-MCNC: 7.1 G/DL (ref 6.8–8.8)
RBC # BLD AUTO: 3.46 10E12/L (ref 3.8–5.2)
SODIUM SERPL-SCNC: 136 MMOL/L (ref 133–144)
TROPONIN I SERPL-MCNC: <0.015 UG/L (ref 0–0.04)
WBC # BLD AUTO: 8.7 10E9/L (ref 4–11)

## 2019-02-25 PROCEDURE — 84484 ASSAY OF TROPONIN QUANT: CPT | Performed by: FAMILY MEDICINE

## 2019-02-25 PROCEDURE — 99284 EMERGENCY DEPT VISIT MOD MDM: CPT | Mod: 25 | Performed by: FAMILY MEDICINE

## 2019-02-25 PROCEDURE — 25000132 ZZH RX MED GY IP 250 OP 250 PS 637: Mod: GY | Performed by: FAMILY MEDICINE

## 2019-02-25 PROCEDURE — 85025 COMPLETE CBC W/AUTO DIFF WBC: CPT | Performed by: FAMILY MEDICINE

## 2019-02-25 PROCEDURE — 93005 ELECTROCARDIOGRAM TRACING: CPT | Performed by: FAMILY MEDICINE

## 2019-02-25 PROCEDURE — 80053 COMPREHEN METABOLIC PANEL: CPT | Performed by: FAMILY MEDICINE

## 2019-02-25 PROCEDURE — A9270 NON-COVERED ITEM OR SERVICE: HCPCS | Mod: GY | Performed by: FAMILY MEDICINE

## 2019-02-25 PROCEDURE — 93010 ELECTROCARDIOGRAM REPORT: CPT | Mod: Z6 | Performed by: FAMILY MEDICINE

## 2019-02-25 PROCEDURE — 99284 EMERGENCY DEPT VISIT MOD MDM: CPT | Performed by: FAMILY MEDICINE

## 2019-02-25 PROCEDURE — 83880 ASSAY OF NATRIURETIC PEPTIDE: CPT | Performed by: FAMILY MEDICINE

## 2019-02-25 RX ORDER — ACETAMINOPHEN 325 MG/1
975 TABLET ORAL ONCE
Status: COMPLETED | OUTPATIENT
Start: 2019-02-25 | End: 2019-02-25

## 2019-02-25 RX ADMIN — ACETAMINOPHEN 975 MG: 325 TABLET ORAL at 01:32

## 2019-02-25 NOTE — ED AVS SNAPSHOT
[facility] Emergency Department  [address] [place] [address]  Phone:  [phone]  Fax:  [phone]                                    Janice K Goodell   MRN: [id number]    Department:  [facility] Emergency Department   Date of Visit:  2/25/2019           After Visit Summary Signature Page    I have received my discharge instructions, and my questions have been answered. I have discussed any challenges I see with this plan with the nurse or doctor.    ..........................................................................................................................................  Patient/Patient Representative Signature      ..........................................................................................................................................  Patient Representative Print Name and Relationship to Patient    ..................................................               ................................................  Date                                   Time    ..........................................................................................................................................  Reviewed by Signature/Title    ...................................................              ..............................................  Date                                               Time          22EPIC Rev 08/18

## 2019-02-25 NOTE — ED PROVIDER NOTES
History     Chief Complaint   Patient presents with     Hypertension     HPI  Janice K Goodell is a 83 year old female who resents with concerns of elevated blood pressure.  Patient has been dealing with blood pressure issues here for a while.  Patient was just in the ER here 2 weeks ago for this problem.  Patient had her diltiazem increased.  She called her cardiologist and they added hydralazine.  Tonight she checked her blood pressure before bedtime and noticed that it was 190 systolic.  She then waited 30 minutes and checked it again and it gone up more.  She tried some hot tea and then tried it again 30 minutes later, it was even higher so she came back in.  Patient denies any current chest pain or shortness of breath but she states earlier in the day most of the day, she was not feeling well, she was feeling somewhat short of breath.  Patient denies any nausea or any vomiting.  She has had a headache today and still has one right now.  Patient denies any fevers or chills.    Allergies:  Allergies   Allergen Reactions     Morphine Sulfate      Amoxicillin      Tongue felt fat- no rash, no SOB     Doxycycline Swelling     Levaquin      Tongue felt fat- no rash, no SOB,     Re-trial of Levaquin made 9/2012 with angioedema reaction to lips.     Sulfa Drugs      Tongue felt fat- no rash, no SOB     Thiazide-Type Diuretics      Zithromax [Azithromycin Dihydrate]      Tongue felt fat- no rash, no SOB       Problem List:    Patient Active Problem List    Diagnosis Date Noted     community acquired pneumonia 09/22/2012     Priority: High     Anemia 09/23/2012     Priority: Medium     COPD (chronic obstructive pulmonary disease) (H) 09/22/2012     Priority: Medium     CKD (chronic kidney disease) stage 3, GFR 30-59 ml/min (H) 09/22/2012     Priority: Medium     Hypokalemia 09/22/2012     Priority: Medium     HTN (hypertension) 09/22/2012     Priority: Medium     Personal history of Lung cancer - right lobectomy 2006,  chemo 2012     Priority: Low        Past Medical History:    Past Medical History:   Diagnosis Date     COPD (chronic obstructive pulmonary disease) (H)      Heart valve disorder      Hypertension      Macular degeneration      Malignant neoplasm (H) 2006     Plantar fasciitis        Past Surgical History:    Past Surgical History:   Procedure Laterality Date     APPENDECTOMY       BIOPSY  2006    Lung     CATARACT IOL, RT/LT  2008    Right eye     CHOLECYSTECTOMY       COLONOSCOPY       Colorectal surgery       ENT SURGERY      Tympanoplasty     HC REMV CATARACT EXTRACAP,INSERT LENS  09    Left eye     HERNIA REPAIR      Incarcerated, 2 surgeries     LASER YAG CAPSULOTOMY  2014    Procedure: LASER YAG CAPSULOTOMY;  Surgeon: Wes Mcbride MD;  Location: [facility] OR     Right lung - lobectomy  2006       Family History:    No family history on file.    Social History:  Marital Status:   [5]  Social History     Tobacco Use     Smoking status: Former Smoker     Last attempt to quit: 10/12/2001     Years since quittin.3   Substance Use Topics     Alcohol use: No     Drug use: No        Medications:      albuterol (PROAIR HFA/PROVENTIL HFA/VENTOLIN HFA) 108 (90 Base) MCG/ACT inhaler   amLODIPine (NORVASC) 5 MG tablet   aspirin 325 MG tablet   BACTROBAN 2 % EX OINT   cefuroxime (CEFTIN) 500 MG tablet   cetirizine (ZYRTEC) 10 MG tablet   desonide (DESOWEN) 0.05 % cream   diltiazem ER (TIAZAC) 240 MG 24 hr ER beaded capsule   docusate sodium (STOOL SOFTENER) 100 MG capsule   ferrous gluconate (FERGON) 324 (38 Fe) MG tablet   fluticasone (FLONASE) 50 MCG/ACT spray   fluticasone-salmeterol (ADVAIR DISKUS) 500-50 MCG/DOSE diskus inhaler   furosemide (LASIX) 40 MG tablet   lisinopril (PRINIVIL/ZESTRIL) 10 MG tablet   metoprolol tartrate (LOPRESSOR) 50 MG tablet   MULTIPLE VITAMIN ESSENTIAL TABS   Multiple Vitamins-Minerals (OCUVITE PRESERVISION PO)   omeprazole (PRILOSEC) 20 MG CR  capsule   oxyCODONE IR (ROXICODONE) 5 MG tablet   potassium chloride SA (K-DUR/KLOR-CON M) 10 MEQ CR tablet   tiotropium (SPIRIVA) 18 MCG capsule   triamcinolone (KENALOG) 0.1 % paste         Review of Systems   All other systems reviewed and are negative.      Physical Exam   BP: 189/83  Pulse: 84  Heart Rate: 84  Temp: 98.1  F (36.7  C)  Resp: 16  Weight: 57.6 kg (127 lb)  SpO2: 97 %      Physical Exam   Constitutional: She is oriented to person, place, and time. She appears well-developed and well-nourished. No distress.   HENT:   Head: Normocephalic and atraumatic.   Nose: Nose normal.   Mouth/Throat: Oropharynx is clear and moist. No oropharyngeal exudate.   Eyes: Conjunctivae are normal.   Neck: Normal range of motion. Neck supple.   Cardiovascular: Normal rate, regular rhythm and normal heart sounds. Exam reveals no friction rub.   No murmur heard.  Pulmonary/Chest: Effort normal and breath sounds normal. No stridor. No respiratory distress. She has no wheezes. She has no rales.   Abdominal: Soft. Bowel sounds are normal. She exhibits no distension and no mass. There is no tenderness. There is no guarding.   Musculoskeletal: Normal range of motion. She exhibits no tenderness.   Neurological: She is alert and oriented to person, place, and time.   Skin: Skin is warm and dry. Capillary refill takes less than 2 seconds. She is not diaphoretic. No erythema.   Psychiatric: Judgment normal.   Nursing note and vitals reviewed.      ED Course        Procedures                EKG Interpretation:      Interpreted by Negro Barroso  Time reviewed: now   Symptoms at time of EKG: now   Rhythm: normal sinus   Rate: normal  Axis: NORMAL  Ectopy: none  Conduction: normal  ST Segments/ T Waves: No ST-T wave changes  Q Waves: none  Comparison to prior: No old EKG available    Clinical Impression: normal EKG  Results for orders placed or performed during the hospital encounter of 02/25/19   CBC with platelets  differential   Result Value Ref Range    WBC 8.7 4.0 - 11.0 10e9/L    RBC Count 3.46 (L) 3.8 - 5.2 10e12/L    Hemoglobin 10.2 (L) 11.7 - 15.7 g/dL    Hematocrit 32.3 (L) 35.0 - 47.0 %    MCV 93 78 - 100 fl    MCH 29.5 26.5 - 33.0 pg    MCHC 31.6 31.5 - 36.5 g/dL    RDW 14.1 10.0 - 15.0 %    Platelet Count 185 150 - 450 10e9/L    Diff Method Automated Method     % Neutrophils 66.6 %    % Lymphocytes 19.3 %    % Monocytes 10.1 %    % Eosinophils 3.6 %    % Basophils 0.3 %    % Immature Granulocytes 0.1 %    Nucleated RBCs 0 0 /100    Absolute Neutrophil 5.8 1.6 - 8.3 10e9/L    Absolute Lymphocytes 1.7 0.8 - 5.3 10e9/L    Absolute Monocytes 0.9 0.0 - 1.3 10e9/L    Absolute Basophils 0.0 0.0 - 0.2 10e9/L    Abs Immature Granulocytes 0.0 0 - 0.4 10e9/L    Absolute Nucleated RBC 0.0    Comprehensive metabolic panel   Result Value Ref Range    Sodium 136 133 - 144 mmol/L    Potassium 4.0 3.4 - 5.3 mmol/L    Chloride 97 94 - 109 mmol/L    Carbon Dioxide 33 (H) 20 - 32 mmol/L    Anion Gap 6 3 - 14 mmol/L    Glucose 105 (H) 70 - 99 mg/dL    Urea Nitrogen 28 7 - 30 mg/dL    Creatinine 0.82 0.52 - 1.04 mg/dL    GFR Estimate 67 >60 mL/min/[1.73_m2]    GFR Estimate If Black 78 >60 mL/min/[1.73_m2]    Calcium 8.8 8.5 - 10.1 mg/dL    Bilirubin Total 0.2 0.2 - 1.3 mg/dL    Albumin 3.2 (L) 3.4 - 5.0 g/dL    Protein Total 7.1 6.8 - 8.8 g/dL    Alkaline Phosphatase 130 40 - 150 U/L    ALT 22 0 - 50 U/L    AST 24 0 - 45 U/L   Troponin I   Result Value Ref Range    Troponin I ES <0.015 0.000 - 0.045 ug/L   Nt probnp inpatient (BNP)   Result Value Ref Range    N-Terminal Pro BNP Inpatient 867 0 - 1,800 pg/mL     Medications   acetaminophen (TYLENOL) tablet 975 mg (975 mg Oral Given 2/25/19 0132)     Labs are reviewed and were unremarkable.  Patient's blood pressure was down.  I only did the labs because she states that she was feeling somewhat short of breath earlier in with a headache want to make sure that the hypertension was not  causing any cardiac strain or signs of heart failure.  At this point I think it is safe to discharge the patient home.  I really do suspect some of her blood pressure elevation is secondary to some anxiety issues.  I strongly encouraged her to follow-up with her doctor in clinic to discuss if a as needed medication for anxiety would be beneficial.  Patient will call tomorrow.      Assessments & Plan (with Medical Decision Making)  Hypertension     I have reviewed the nursing notes.    I have reviewed the findings, diagnosis, plan and need for follow up with the patient.              2/25/2019   [facility] EMERGENCY DEPARTMENT     Negro Barroso MD  02/25/19 0221

## 2019-02-25 NOTE — ED TRIAGE NOTES
Pt presents with hypertension. Pt states she checks BP before bed and noted it to be 190's systolic. Pt then had some hot tea and checked 3 more times. Last reading 225 systolic. Pt mentions that she was in two weeks ago to the ER they got her blood pressure under control and she went home. Pt is in remission for small cell lymphoma.

## 2019-05-04 ENCOUNTER — HOSPITAL ENCOUNTER (EMERGENCY)
Facility: CLINIC | Age: 84
Discharge: HOME | End: 2019-05-04
Attending: EMERGENCY MEDICINE | Admitting: EMERGENCY MEDICINE
Payer: MEDICARE

## 2019-05-04 VITALS
RESPIRATION RATE: 22 BRPM | OXYGEN SATURATION: 98 % | SYSTOLIC BLOOD PRESSURE: 122 MMHG | HEART RATE: 77 BPM | TEMPERATURE: 97.1 F | BODY MASS INDEX: 24.8 KG/M2 | WEIGHT: 127 LBS | DIASTOLIC BLOOD PRESSURE: 69 MMHG

## 2019-05-04 DIAGNOSIS — R51.9 BILATERAL HEADACHE: ICD-10-CM

## 2019-05-04 DIAGNOSIS — E86.0 DEHYDRATION: ICD-10-CM

## 2019-05-04 DIAGNOSIS — C34.90 NON-SMALL CELL CARCINOMA OF LUNG, STAGE 4, UNSPECIFIED LATERALITY (H): ICD-10-CM

## 2019-05-04 LAB
ALBUMIN SERPL-MCNC: 2.8 G/DL (ref 3.4–5)
ALP SERPL-CCNC: 119 U/L (ref 40–150)
ALT SERPL W P-5'-P-CCNC: 26 U/L (ref 0–50)
ANION GAP SERPL CALCULATED.3IONS-SCNC: 3 MMOL/L (ref 3–14)
AST SERPL W P-5'-P-CCNC: 23 U/L (ref 0–45)
BASOPHILS # BLD AUTO: 0 10E9/L (ref 0–0.2)
BASOPHILS NFR BLD AUTO: 0.3 %
BILIRUB SERPL-MCNC: 0.4 MG/DL (ref 0.2–1.3)
BUN SERPL-MCNC: 23 MG/DL (ref 7–30)
CALCIUM SERPL-MCNC: 9.1 MG/DL (ref 8.5–10.1)
CHLORIDE SERPL-SCNC: 90 MMOL/L (ref 94–109)
CO2 SERPL-SCNC: 42 MMOL/L (ref 20–32)
CREAT SERPL-MCNC: 0.83 MG/DL (ref 0.52–1.04)
DIFFERENTIAL METHOD BLD: ABNORMAL
EOSINOPHIL NFR BLD AUTO: 1.7 %
ERYTHROCYTE [DISTWIDTH] IN BLOOD BY AUTOMATED COUNT: 12.8 % (ref 10–15)
GFR SERPL CREATININE-BSD FRML MDRD: 66 ML/MIN/{1.73_M2}
GLUCOSE SERPL-MCNC: 121 MG/DL (ref 70–99)
HCT VFR BLD AUTO: 27.8 % (ref 35–47)
HGB BLD-MCNC: 8.5 G/DL (ref 11.7–15.7)
IMM GRANULOCYTES # BLD: 0 10E9/L (ref 0–0.4)
IMM GRANULOCYTES NFR BLD: 0.4 %
LYMPHOCYTES # BLD AUTO: 0.8 10E9/L (ref 0.8–5.3)
LYMPHOCYTES NFR BLD AUTO: 11.7 %
MCH RBC QN AUTO: 29.9 PG (ref 26.5–33)
MCHC RBC AUTO-ENTMCNC: 30.6 G/DL (ref 31.5–36.5)
MCV RBC AUTO: 98 FL (ref 78–100)
MONOCYTES # BLD AUTO: 0.2 10E9/L (ref 0–1.3)
MONOCYTES NFR BLD AUTO: 2.7 %
NEUTROPHILS # BLD AUTO: 5.9 10E9/L (ref 1.6–8.3)
NEUTROPHILS NFR BLD AUTO: 83.2 %
NRBC # BLD AUTO: 0 10*3/UL
NRBC BLD AUTO-RTO: 0 /100
PLATELET # BLD AUTO: 214 10E9/L (ref 150–450)
POTASSIUM SERPL-SCNC: 4.5 MMOL/L (ref 3.4–5.3)
PROT SERPL-MCNC: 6.8 G/DL (ref 6.8–8.8)
RBC # BLD AUTO: 2.84 10E12/L (ref 3.8–5.2)
SODIUM SERPL-SCNC: 135 MMOL/L (ref 133–144)
TROPONIN I SERPL-MCNC: <0.015 UG/L (ref 0–0.04)
WBC # BLD AUTO: 7.1 10E9/L (ref 4–11)

## 2019-05-04 PROCEDURE — 99284 EMERGENCY DEPT VISIT MOD MDM: CPT | Mod: Z6 | Performed by: EMERGENCY MEDICINE

## 2019-05-04 PROCEDURE — 80053 COMPREHEN METABOLIC PANEL: CPT | Performed by: EMERGENCY MEDICINE

## 2019-05-04 PROCEDURE — 25000128 H RX IP 250 OP 636: Performed by: EMERGENCY MEDICINE

## 2019-05-04 PROCEDURE — 85025 COMPLETE CBC W/AUTO DIFF WBC: CPT | Performed by: EMERGENCY MEDICINE

## 2019-05-04 PROCEDURE — 96374 THER/PROPH/DIAG INJ IV PUSH: CPT

## 2019-05-04 PROCEDURE — 96361 HYDRATE IV INFUSION ADD-ON: CPT

## 2019-05-04 PROCEDURE — 99284 EMERGENCY DEPT VISIT MOD MDM: CPT | Mod: 25

## 2019-05-04 PROCEDURE — 84484 ASSAY OF TROPONIN QUANT: CPT | Performed by: EMERGENCY MEDICINE

## 2019-05-04 RX ORDER — SODIUM CHLORIDE 9 MG/ML
1000 INJECTION, SOLUTION INTRAVENOUS CONTINUOUS
Status: DISCONTINUED | OUTPATIENT
Start: 2019-05-04 | End: 2019-05-04 | Stop reason: HOSPADM

## 2019-05-04 RX ORDER — ONDANSETRON 2 MG/ML
4 INJECTION INTRAMUSCULAR; INTRAVENOUS ONCE
Status: DISCONTINUED | OUTPATIENT
Start: 2019-05-04 | End: 2019-05-04 | Stop reason: HOSPADM

## 2019-05-04 RX ORDER — LIDOCAINE 40 MG/G
CREAM TOPICAL
Status: DISCONTINUED
Start: 2019-05-04 | End: 2019-05-04 | Stop reason: HOSPADM

## 2019-05-04 RX ORDER — HEPARIN SODIUM (PORCINE) LOCK FLUSH IV SOLN 100 UNIT/ML 100 UNIT/ML
5 SOLUTION INTRAVENOUS
Status: DISCONTINUED | OUTPATIENT
Start: 2019-05-04 | End: 2019-05-04 | Stop reason: HOSPADM

## 2019-05-04 RX ORDER — OXYCODONE AND ACETAMINOPHEN 5; 325 MG/1; MG/1
1 TABLET ORAL EVERY 12 HOURS PRN
Qty: 12 TABLET | Refills: 0 | Status: SHIPPED | OUTPATIENT
Start: 2019-05-04 | End: 2019-06-29

## 2019-05-04 RX ADMIN — SODIUM CHLORIDE 1000 ML: 9 INJECTION, SOLUTION INTRAVENOUS at 12:51

## 2019-05-04 RX ADMIN — Medication 5 ML: at 15:10

## 2019-05-04 ASSESSMENT — ENCOUNTER SYMPTOMS
APPETITE CHANGE: 1
CHILLS: 0
ABDOMINAL PAIN: 0
SHORTNESS OF BREATH: 0
HEADACHES: 1
PALPITATIONS: 0
CONFUSION: 0
NAUSEA: 1
FEVER: 0
WEAKNESS: 1
LIGHT-HEADEDNESS: 1
ACTIVITY CHANGE: 1
FATIGUE: 1
COUGH: 0

## 2019-05-04 NOTE — DISCHARGE INSTRUCTIONS
"Focus on nutrition, hydration.  Keep scheduled appointment with oncology for Wednesday at that time he can discuss if they should go forth with a second round of chemotherapy based upon how you are feeling.     With dehydration recommend holding Lasix.  Continue to monitor your weight each morning.  If you note a 3 or 4 pound weight gain would then resume Lasix until your weight is back down to your \"dry weight\".    With persistent pain/headache-continue with Tylenol.  If this is not sufficient to help with severe headache then may take Percocet 1tablet.  Narcotics can cause confusion, increased risk for falls, constipation.  "

## 2019-05-04 NOTE — ED PROVIDER NOTES
"  History     Chief Complaint   Patient presents with     Headache     HPI  Janice K Goodell is a 83 year old female significant past medical history for non-small cell lung cancer with stenosis.  Presents with complaints of generalized weakness, positional headache described as a throbbing pounding headache that is bilateral.  States that headache is only present when she changes positions internal.  Position.  States that this new chemotherapy is \"wiped her out\".  She is concerned about dehydration and anemia.  She spoke to her oncology RN who recommended she come in to have her blood counts checked and determine if she was in need of a transfusion.  She does not have any shortness of breath or chest discomfort.  Denies any abdominal pain.  Daughter tells that her appetite is been poor and she is been much weaker since her last chemotherapy dosing.  This was a recent change because of progression    Allergies:  Allergies   Allergen Reactions     Sulfa Drugs      Tongue felt fat- no rash, no SOB  Other reaction(s): Edema     Adhesive Tape Rash     reacted to the adhesive on a Catarpes patch-not allergic to the Catapres-also reacted to the adhesive on leads from her Holter Monitor-not allergic to Band-Aids     Morphine Sulfate      Amoxicillin      Tongue felt fat- no rash, no SOB     Ceftriaxone Other (See Comments)     Dysphagia, but tolerates with benadryl, however has a long history of intermittent dysphagia. Unclear it is from the antibiotic     Doxycycline Swelling     Levaquin      Tongue felt fat- no rash, no SOB,     Re-trial of Levaquin made 9/2012 with angioedema reaction to lips.     Zithromax [Azithromycin Dihydrate]      Tongue felt fat- no rash, no SOB     Thiazide-Type Diuretics Rash       Problem List:    Patient Active Problem List    Diagnosis Date Noted     community acquired pneumonia 09/22/2012     Priority: High     Anemia 09/23/2012     Priority: Medium     COPD (chronic obstructive pulmonary " disease) (H) 2012     Priority: Medium     CKD (chronic kidney disease) stage 3, GFR 30-59 ml/min (H) 2012     Priority: Medium     Hypokalemia 2012     Priority: Medium     HTN (hypertension) 2012     Priority: Medium     Personal history of Lung cancer - right lobectomy 2006, chemo 2007 2012     Priority: Low        Past Medical History:    Past Medical History:   Diagnosis Date     COPD (chronic obstructive pulmonary disease) (H)      Heart valve disorder      Hypertension      Macular degeneration      Malignant neoplasm (H) 2006     Plantar fasciitis        Past Surgical History:    Past Surgical History:   Procedure Laterality Date     APPENDECTOMY       BIOPSY  2006    Lung     CATARACT IOL, RT/LT  2008    Right eye     CHOLECYSTECTOMY       COLONOSCOPY       Colorectal surgery       ENT SURGERY      Tympanoplasty     HC REMV CATARACT EXTRACAP,INSERT LENS  09    Left eye     HERNIA REPAIR      Incarcerated, 2 surgeries     LASER YAG CAPSULOTOMY  2014    Procedure: LASER YAG CAPSULOTOMY;  Surgeon: Wes Mcbride MD;  Location: [facility] OR     Right lung - lobectomy         Family History:    No family history on file.    Social History:  Marital Status:   [5]  Social History     Tobacco Use     Smoking status: Former Smoker     Last attempt to quit: 10/12/2001     Years since quittin.5   Substance Use Topics     Alcohol use: No     Drug use: No        Medications:      albuterol (PROAIR HFA/PROVENTIL HFA/VENTOLIN HFA) 108 (90 Base) MCG/ACT inhaler   amLODIPine (NORVASC) 5 MG tablet   aspirin 325 MG tablet   BACTROBAN 2 % EX OINT   cefuroxime (CEFTIN) 500 MG tablet   cetirizine (ZYRTEC) 10 MG tablet   desonide (DESOWEN) 0.05 % cream   diltiazem ER (TIAZAC) 240 MG 24 hr ER beaded capsule   docusate sodium (STOOL SOFTENER) 100 MG capsule   ferrous gluconate (FERGON) 324 (38 Fe) MG tablet   fluticasone (FLONASE) 50 MCG/ACT spray    fluticasone-salmeterol (ADVAIR DISKUS) 500-50 MCG/DOSE diskus inhaler   furosemide (LASIX) 40 MG tablet   lisinopril (PRINIVIL/ZESTRIL) 10 MG tablet   metoprolol tartrate (LOPRESSOR) 50 MG tablet   MULTIPLE VITAMIN ESSENTIAL TABS   Multiple Vitamins-Minerals (OCUVITE PRESERVISION PO)   omeprazole (PRILOSEC) 20 MG CR capsule   oxyCODONE IR (ROXICODONE) 5 MG tablet   potassium chloride SA (K-DUR/KLOR-CON M) 10 MEQ CR tablet   tiotropium (SPIRIVA) 18 MCG capsule   triamcinolone (KENALOG) 0.1 % paste         Review of Systems   Constitutional: Positive for activity change, appetite change and fatigue. Negative for chills and fever.   HENT: Negative.    Respiratory: Negative for cough and shortness of breath.    Cardiovascular: Negative for chest pain, palpitations and leg swelling.   Gastrointestinal: Positive for nausea. Negative for abdominal pain.   Neurological: Positive for weakness, light-headedness and headaches.   Psychiatric/Behavioral: Negative for confusion.   All other systems reviewed and are negative.      Physical Exam   BP: 137/48  Pulse: 77  Temp: 97.1  F (36.2  C)  Resp: 22  Weight: 57.6 kg (127 lb)  SpO2: 98 %      Physical Exam   Constitutional: She is oriented to person, place, and time. Vital signs are normal. She appears well-nourished. She does not appear ill.   HENT:   Head: Normocephalic and atraumatic.   Nose: Nose normal.   Mouth/Throat: Oropharynx is clear and moist and mucous membranes are normal.   Eyes: Pupils are equal, round, and reactive to light. Conjunctivae, EOM and lids are normal. Scleral icterus is present.   Fundoscopic exam:       The right eye shows no hemorrhage.        The left eye shows no hemorrhage.   Neck: Trachea normal. Neck supple. No JVD present. Carotid bruit is not present.   Cardiovascular: Normal rate, regular rhythm, S1 normal, S2 normal and intact distal pulses.  No extrasystoles are present.   No murmur heard.  Pulmonary/Chest: Effort normal and breath  sounds normal. No respiratory distress.   Abdominal: Soft. Bowel sounds are normal. She exhibits no distension. There is no splenomegaly or hepatomegaly. There is no tenderness. There is no rebound. No hernia.   Musculoskeletal: Normal range of motion.   Lymphadenopathy:     She has no cervical adenopathy.   Neurological: She is alert and oriented to person, place, and time. She has normal strength and normal reflexes. She displays normal reflexes. No cranial nerve deficit or sensory deficit. She exhibits normal muscle tone. Coordination normal.   Skin: Skin is warm and dry. No rash noted. No pallor.   Patient appears jaundiced   Psychiatric: She has a normal mood and affect. Her speech is normal and behavior is normal. Cognition and memory are normal.   Nursing note and vitals reviewed.      ED Course        Procedures                   No results found for this or any previous visit (from the past 24 hour(s)).    Medications   0.9% sodium chloride BOLUS (has no administration in time range)     Followed by   sodium chloride 0.9% infusion (has no administration in time range)   lidocaine (LMX4) 4 % kit (has no administration in time range)       Assessments & Plan (with Medical Decision Making)  83-year-old female presents with positional headache described as throbbing.  States that she is very weak, tired and nauseated since undergoing a change in her chemotherapy.  She is currently being treated for metastatic non-small cell lung cancer.  She contacted her oncology RN who recommended coming to see if she is in need of a transfusion.  Describes her headache as being only positional.  When she is sitting or semi-supine she does not have a headache.  She has had no other associated neurologic symptoms including no seizure to suspect malignancy/metastasis to brain.  With headache being positional most likely due to fluid volume status/anemia.  She has not had any procedure will be worried about a spinal leak.  Plan:  Access her PowerPort.  IV hydration.  Zofran for nausea, CBC, CMP.  2:59 PM  Partial response with IV fluids and helping suppress positional headache.  Patient states she is ready for discharge.  I asked that she focus on nutrition and hydration.  She has a schedule appoint with oncology on Wednesday to determine if she is suitable for second round of her new chemotherapy.  Patient is to return if she is becoming dehydrated or notes a fever.  If the headache persists or worsen would then consider imaging to make sure there is no metastasis.  With it being positional only unlikely.  Hydration did measurably improve the severe headache when she was up walking to the bathroom.       I have reviewed the nursing notes.    I have reviewed the findings, diagnosis, plan and need for follow up with the patient.         Medication List      There are no discharge medications for this visit.         Final diagnoses:   Dehydration   Non-small cell carcinoma of lung, stage 4, unspecified laterality (H)   Bilateral headache       5/4/2019   [facility] EMERGENCY DEPARTMENT     Jaylon Sterling,   05/04/19 1500

## 2019-05-07 ENCOUNTER — HOSPITAL ENCOUNTER (EMERGENCY)
Facility: CLINIC | Age: 84
Discharge: HOME | End: 2019-05-07
Attending: EMERGENCY MEDICINE | Admitting: EMERGENCY MEDICINE
Payer: MEDICARE

## 2019-05-07 ENCOUNTER — APPOINTMENT (OUTPATIENT)
Dept: GENERAL RADIOLOGY | Facility: CLINIC | Age: 84
End: 2019-05-07
Attending: EMERGENCY MEDICINE
Payer: MEDICARE

## 2019-05-07 VITALS
TEMPERATURE: 98.2 F | DIASTOLIC BLOOD PRESSURE: 64 MMHG | RESPIRATION RATE: 16 BRPM | HEART RATE: 80 BPM | OXYGEN SATURATION: 98 % | SYSTOLIC BLOOD PRESSURE: 149 MMHG

## 2019-05-07 DIAGNOSIS — R07.9 CHEST PAIN, UNSPECIFIED TYPE: ICD-10-CM

## 2019-05-07 DIAGNOSIS — J90 PLEURAL EFFUSION: ICD-10-CM

## 2019-05-07 LAB
ANION GAP SERPL CALCULATED.3IONS-SCNC: 7 MMOL/L (ref 3–14)
BASOPHILS # BLD AUTO: 0 10E9/L (ref 0–0.2)
BASOPHILS NFR BLD AUTO: 0.4 %
BUN SERPL-MCNC: 17 MG/DL (ref 7–30)
CALCIUM SERPL-MCNC: 8.7 MG/DL (ref 8.5–10.1)
CHLORIDE SERPL-SCNC: 91 MMOL/L (ref 94–109)
CO2 SERPL-SCNC: 37 MMOL/L (ref 20–32)
CREAT SERPL-MCNC: 0.85 MG/DL (ref 0.52–1.04)
DIFFERENTIAL METHOD BLD: ABNORMAL
EOSINOPHIL NFR BLD AUTO: 2.3 %
ERYTHROCYTE [DISTWIDTH] IN BLOOD BY AUTOMATED COUNT: 12.3 % (ref 10–15)
GFR SERPL CREATININE-BSD FRML MDRD: 64 ML/MIN/{1.73_M2}
GLUCOSE SERPL-MCNC: 96 MG/DL (ref 70–99)
HCT VFR BLD AUTO: 25.1 % (ref 35–47)
HGB BLD-MCNC: 7.8 G/DL (ref 11.7–15.7)
IMM GRANULOCYTES # BLD: 0 10E9/L (ref 0–0.4)
IMM GRANULOCYTES NFR BLD: 0.4 %
LYMPHOCYTES # BLD AUTO: 1.2 10E9/L (ref 0.8–5.3)
LYMPHOCYTES NFR BLD AUTO: 21.4 %
MCH RBC QN AUTO: 30 PG (ref 26.5–33)
MCHC RBC AUTO-ENTMCNC: 31.1 G/DL (ref 31.5–36.5)
MCV RBC AUTO: 97 FL (ref 78–100)
MONOCYTES # BLD AUTO: 0.3 10E9/L (ref 0–1.3)
MONOCYTES NFR BLD AUTO: 4.6 %
NEUTROPHILS # BLD AUTO: 4 10E9/L (ref 1.6–8.3)
NEUTROPHILS NFR BLD AUTO: 70.9 %
NRBC # BLD AUTO: 0 10*3/UL
NRBC BLD AUTO-RTO: 0 /100
PLATELET # BLD AUTO: 148 10E9/L (ref 150–450)
POTASSIUM SERPL-SCNC: 3.9 MMOL/L (ref 3.4–5.3)
RBC # BLD AUTO: 2.6 10E12/L (ref 3.8–5.2)
SODIUM SERPL-SCNC: 135 MMOL/L (ref 133–144)
TROPONIN I SERPL-MCNC: <0.015 UG/L (ref 0–0.04)
WBC # BLD AUTO: 5.6 10E9/L (ref 4–11)

## 2019-05-07 PROCEDURE — 85025 COMPLETE CBC W/AUTO DIFF WBC: CPT | Performed by: EMERGENCY MEDICINE

## 2019-05-07 PROCEDURE — 99284 EMERGENCY DEPT VISIT MOD MDM: CPT | Mod: Z6 | Performed by: EMERGENCY MEDICINE

## 2019-05-07 PROCEDURE — 84484 ASSAY OF TROPONIN QUANT: CPT | Performed by: EMERGENCY MEDICINE

## 2019-05-07 PROCEDURE — 71046 X-RAY EXAM CHEST 2 VIEWS: CPT | Mod: TC

## 2019-05-07 PROCEDURE — 99284 EMERGENCY DEPT VISIT MOD MDM: CPT | Mod: 25

## 2019-05-07 PROCEDURE — 80048 BASIC METABOLIC PNL TOTAL CA: CPT | Performed by: EMERGENCY MEDICINE

## 2019-05-07 NOTE — ED AVS SNAPSHOT
[facility] Emergency Department  [address] [place] [address]  Phone:  [phone]  Fax:  [phone]                                    Janice K Goodell   MRN: [id number]    Department:  [facility] Emergency Department   Date of Visit:  5/7/2019           After Visit Summary Signature Page    I have received my discharge instructions, and my questions have been answered. I have discussed any challenges I see with this plan with the nurse or doctor.    ..........................................................................................................................................  Patient/Patient Representative Signature      ..........................................................................................................................................  Patient Representative Print Name and Relationship to Patient    ..................................................               ................................................  Date                                   Time    ..........................................................................................................................................  Reviewed by Signature/Title    ...................................................              ..............................................  Date                                               Time          22EPIC Rev 08/18

## 2019-05-08 NOTE — DISCHARGE INSTRUCTIONS
As discussed I am uncertain as to the cause of your chest pain.  This may be due to your lung cancer or chest wall pain.  Dr. Barroso will follow up on your labs that were performed this evening.  Return to the ER or call 911 if you develop new or worsening symptoms.

## 2019-05-08 NOTE — ED NOTES
"Patient states, \"I'm not getting hooked up to anything until I go to the bathroom.\"  Chicho SILVER RN notified.   "

## 2019-05-08 NOTE — ED PROVIDER NOTES
History     Chief Complaint   Patient presents with     Chest Pain     The history is provided by the patient and a relative.     Janice K Goodell is a 83 year old female who has lung cancer presents to the emergency department via EMS for chest pain. Patient reports having sudden chest pain around 1930 this evening across her lower sternum that she rated at a 10/10. She states it radiated through to her back and was sharp pain. She reports the pain was getting better by the time EMS arrived and after 4 baby Asprin and 1 nitroglycerin spray her pain resolved. She is unsure if her pain went away on its own or the medications given by EMS. Patient was diagnosed with lung cancer 8/18, had 12 chemo treatments and was in remission in 11/18, then her lung cancer returned in 12/18. Her daughter reports she had 1 chemo session (different from the first chemo) and had a bad reaction to it so hasn't had any since. Patient reports having lung cancer back in 2006 when she had a right upper lobectomy. They have an appointment tomorrow with her oncologist to discuss possible radiation to shrink her lung mass. Patient reports having a cough due to COPD and recent fluid in her lungs. She had a thoracentesis 2 weeks ago. She denies any shortness of breath, belly pain, vomiting, diarrhea, fever, sore throat, racing heart, palpitations, swelling in her legs or diaphoresis. She denies any history of heart problems, her last stress test was last year and normal. She does have paroxysmal A-Fib and is on Xarelto for that. Patient is on 3L of oxygen at home.    Allergies:  Allergies   Allergen Reactions     Sulfa Drugs      Tongue felt fat- no rash, no SOB  Other reaction(s): Edema     Adhesive Tape Rash     reacted to the adhesive on a Catarpes patch-not allergic to the Catapres-also reacted to the adhesive on leads from her Holter Monitor-not allergic to Band-Aids     Morphine Sulfate      Amoxicillin      Tongue felt fat- no rash, no  SOB     Ceftriaxone Other (See Comments)     Dysphagia, but tolerates with benadryl, however has a long history of intermittent dysphagia. Unclear it is from the antibiotic     Doxycycline Swelling     Levaquin      Tongue felt fat- no rash, no SOB,     Re-trial of Levaquin made 9/2012 with angioedema reaction to lips.     Zithromax [Azithromycin Dihydrate]      Tongue felt fat- no rash, no SOB     Thiazide-Type Diuretics Rash       Problem List:    Patient Active Problem List    Diagnosis Date Noted     community acquired pneumonia 09/22/2012     Priority: High     Anemia 09/23/2012     Priority: Medium     COPD (chronic obstructive pulmonary disease) (H) 09/22/2012     Priority: Medium     CKD (chronic kidney disease) stage 3, GFR 30-59 ml/min (H) 09/22/2012     Priority: Medium     Hypokalemia 09/22/2012     Priority: Medium     HTN (hypertension) 09/22/2012     Priority: Medium     Personal history of Lung cancer - right lobectomy 2006, chemo 2007 09/22/2012     Priority: Low        Past Medical History:    Past Medical History:   Diagnosis Date     COPD (chronic obstructive pulmonary disease) (H)      Heart valve disorder      Hypertension      Macular degeneration      Malignant neoplasm (H) 2006     Plantar fasciitis        Past Surgical History:    Past Surgical History:   Procedure Laterality Date     APPENDECTOMY       BIOPSY  2006    Lung     CATARACT IOL, RT/LT  6/26/2008    Right eye     CHOLECYSTECTOMY       COLONOSCOPY       Colorectal surgery       ENT SURGERY      Tympanoplasty     HC REMV CATARACT EXTRACAP,INSERT LENS  04/23/09    Left eye     HERNIA REPAIR      Incarcerated, 2 surgeries     LASER YAG CAPSULOTOMY  7/24/2014    Procedure: LASER YAG CAPSULOTOMY;  Surgeon: Wes Mcbride MD;  Location: [facility] OR     Right lung - lobectomy  2006       Family History:    No family history on file.    Social History:  Marital Status:   [5]  Social History     Tobacco Use     Smoking status:  Former Smoker     Last attempt to quit: 10/12/2001     Years since quittin.5   Substance Use Topics     Alcohol use: No     Drug use: No        Medications:      albuterol (PROAIR HFA/PROVENTIL HFA/VENTOLIN HFA) 108 (90 Base) MCG/ACT inhaler   amLODIPine (NORVASC) 5 MG tablet   aspirin 325 MG tablet   BACTROBAN 2 % EX OINT   cefuroxime (CEFTIN) 500 MG tablet   cetirizine (ZYRTEC) 10 MG tablet   desonide (DESOWEN) 0.05 % cream   diltiazem ER (TIAZAC) 240 MG 24 hr ER beaded capsule   docusate sodium (STOOL SOFTENER) 100 MG capsule   ferrous gluconate (FERGON) 324 (38 Fe) MG tablet   fluticasone (FLONASE) 50 MCG/ACT spray   fluticasone-salmeterol (ADVAIR DISKUS) 500-50 MCG/DOSE diskus inhaler   furosemide (LASIX) 40 MG tablet   lisinopril (PRINIVIL/ZESTRIL) 10 MG tablet   metoprolol tartrate (LOPRESSOR) 50 MG tablet   MULTIPLE VITAMIN ESSENTIAL TABS   Multiple Vitamins-Minerals (OCUVITE PRESERVISION PO)   omeprazole (PRILOSEC) 20 MG CR capsule   oxyCODONE IR (ROXICODONE) 5 MG tablet   oxyCODONE-acetaminophen (PERCOCET) 5-325 MG tablet   potassium chloride SA (K-DUR/KLOR-CON M) 10 MEQ CR tablet   tiotropium (SPIRIVA) 18 MCG capsule   triamcinolone (KENALOG) 0.1 % paste         Review of Systems   All other systems reviewed and are negative.      Physical Exam          Physical Exam   Constitutional: She is oriented to person, place, and time. She appears well-developed and well-nourished. No distress.   HENT:   Head: Normocephalic and atraumatic.   Eyes: Pupils are equal, round, and reactive to light. No scleral icterus.   Neck: Normal range of motion. Neck supple.   Cardiovascular: Normal rate and regular rhythm.   Pulmonary/Chest: Effort normal. No stridor. No respiratory distress. She has no wheezes. She exhibits tenderness.   Creased breath sounds on the left lower chest there is some right-sided chest wall tenderness similar to previous costochondritis   Abdominal: Soft. There is no tenderness.    Musculoskeletal: Normal range of motion. She exhibits no edema.   Neurological: She is alert and oriented to person, place, and time.   Skin: Skin is warm and dry. Capillary refill takes less than 2 seconds. No rash noted. She is not diaphoretic. No erythema. No pallor.   Psychiatric: She has a normal mood and affect. Her behavior is normal. Thought content normal.   Nursing note and vitals reviewed.      ED Course        Procedures                   Results for orders placed or performed during the hospital encounter of 05/07/19 (from the past 24 hour(s))   XR Chest 2 Views    Narrative    XR CHEST 2 VW   5/7/2019 9:38 PM     HISTORY: chest pain hx of lung malignancy and right upper lobectomy    COMPARISON: CT scan dated 1/9/2018    FINDINGS: Emphysematous change. A right port catheter is in place. The  tip is projected over the superior vena cava..  Moderate size left  pleural effusion is present. This is increased in size since 1/9/2019.  There is associated infiltrate or atelectasis at the left lung base..  Soft tissue density is seen at the left apex. This was better seen on  the CT of 1/9/2019..  There are diffuse interstitial changes in both  lungs. These have increased since 11/5/2018. This could be due to  interstitial edema..      Impression    IMPRESSION:   1. Increase in the size of the left pleural effusion when compared to  the CT of 1/9/2019. There is now a moderate size left pleural  effusion.  2. Diffuse interstitial opacities. This may be due to fluid overload  or interstitial edema.  3. Parenchymal opacity at the left lung apex. This is better  characterized on the recent CT scan.       Medications - No data to display    Assessments & Plan (with Medical Decision Making)  Atypical chest pain that resolved prior to arrival.  She had no associated symptoms such as shortness of breath that was new, diaphoresis or nausea.  She had a stress test not that long ago that was normal.  Her EKG is  reassuring here this evening.  There is no acute changes.  Her pleural effusion is a bit larger and she has some interstitial edema which would not give her pain but rather shortness of breath.  Her shortness of breath is at its baseline.  She has an oncology appointment tomorrow where they can address this pleural effusion.  I do not think she is appropriate for an emergency department thoracentesis given she is on Xarelto and she is stable clinically.  We are awaiting the results of her lab tests which were just drawn.  I signed these over to Dr. Barroso at change of shift but advised him to discharge patient home if her troponin and labs look okay.  I discussed with the family that this does not fully rule out acute coronary syndrome or coronary artery disease but since her symptoms were atypical, short-lived and had no associated features I think is reasonable to do so.  We also discussed doing a 2-hour delta troponin but they feel that that is unnecessary at this stage.  She has been pain-free in the emergency department. The differential diagnosis, treatment options, risks and follow up discussed with a competent patient and/or competent family member who agrees with the plan.     I have reviewed the nursing notes.    I have reviewed the findings, diagnosis, plan and need for follow up with the patient.         Medication List      There are no discharge medications for this visit.         Final diagnoses:   Chest pain, unspecified type   Pleural effusion     This document serves as a record of services personally performed by Dash Roman MD. It was created on their behalf by Aishwarya Car, a trained medical scribe. The creation of this record is based on the provider's personal observations and the statements of the patient. This document has been checked and approved by the attending provider.    Note: Chart documentation done in part with Dragon Voice Recognition software. Although reviewed after completion, some  word and grammatical errors may remain.    5/7/2019   [facility] EMERGENCY DEPARTMENT     Jaylon Roman MD  05/07/19 2266

## 2019-05-08 NOTE — ED PROVIDER NOTES
Transfer of Care Note  This patient was signed out to me and I assumed care from Dr. Roman at change of shift.  Janice K Goodell is a 83 year old female who presented to the emergency department with a chief complaint of Chest Pain  .  Please see the original providers history and physical for complete details.    The following issues were signed out to me to follow up on:  labs      Pertant Lab/Imaging Findings During this Visit was     Results for orders placed or performed during the hospital encounter of 05/07/19 (from the past 24 hour(s))   XR Chest 2 Views    Narrative    XR CHEST 2 VW   5/7/2019 9:38 PM     HISTORY: chest pain hx of lung malignancy and right upper lobectomy    COMPARISON: CT scan dated 1/9/2018    FINDINGS: Emphysematous change. A right port catheter is in place. The  tip is projected over the superior vena cava..  Moderate size left  pleural effusion is present. This is increased in size since 1/9/2019.  There is associated infiltrate or atelectasis at the left lung base..  Soft tissue density is seen at the left apex. This was better seen on  the CT of 1/9/2019..  There are diffuse interstitial changes in both  lungs. These have increased since 11/5/2018. This could be due to  interstitial edema..      Impression    IMPRESSION:   1. Increase in the size of the left pleural effusion when compared to  the CT of 1/9/2019. There is now a moderate size left pleural  effusion.  2. Diffuse interstitial opacities. This may be due to fluid overload  or interstitial edema.  3. Parenchymal opacity at the left lung apex. This is better  characterized on the recent CT scan.    ALEX LOZANO MD   CBC with platelets differential   Result Value Ref Range    WBC 5.6 4.0 - 11.0 10e9/L    RBC Count 2.60 (L) 3.8 - 5.2 10e12/L    Hemoglobin 7.8 (L) 11.7 - 15.7 g/dL    Hematocrit 25.1 (L) 35.0 - 47.0 %    MCV 97 78 - 100 fl    MCH 30.0 26.5 - 33.0 pg    MCHC 31.1 (L) 31.5 - 36.5 g/dL    RDW 12.3 10.0 - 15.0 %     Platelet Count 148 (L) 150 - 450 10e9/L    Diff Method Automated Method     % Neutrophils 70.9 %    % Lymphocytes 21.4 %    % Monocytes 4.6 %    % Eosinophils 2.3 %    % Basophils 0.4 %    % Immature Granulocytes 0.4 %    Nucleated RBCs 0 0 /100    Absolute Neutrophil 4.0 1.6 - 8.3 10e9/L    Absolute Lymphocytes 1.2 0.8 - 5.3 10e9/L    Absolute Monocytes 0.3 0.0 - 1.3 10e9/L    Absolute Basophils 0.0 0.0 - 0.2 10e9/L    Abs Immature Granulocytes 0.0 0 - 0.4 10e9/L    Absolute Nucleated RBC 0.0    Basic metabolic panel   Result Value Ref Range    Sodium 135 133 - 144 mmol/L    Potassium 3.9 3.4 - 5.3 mmol/L    Chloride 91 (L) 94 - 109 mmol/L    Carbon Dioxide 37 (H) 20 - 32 mmol/L    Anion Gap 7 3 - 14 mmol/L    Glucose 96 70 - 99 mg/dL    Urea Nitrogen 17 7 - 30 mg/dL    Creatinine 0.85 0.52 - 1.04 mg/dL    GFR Estimate 64 >60 mL/min/[1.73_m2]    GFR Estimate If Black 75 >60 mL/min/[1.73_m2]    Calcium 8.7 8.5 - 10.1 mg/dL   Troponin I (now)   Result Value Ref Range    Troponin I ES <0.015 0.000 - 0.045 ug/L         My focused follow up physical exam shows:   Vitals:  B/P: Data Unavailable, T: Data Unavailable, P: Data Unavailable, R: Data Unavailable  Gen:  Pt appears stable and no apparent distress      ED Course & Medical Decision Making:  Labs came back and were unremarkable.  Patient's hemoglobin had drifted down a little bit compared to the last reading.  It is not low enough that the patient needs an transfusion at this point.  Patient has an appointment tomorrow with oncology and they can address this there.  Per Dr. Gomez's note, this does appear to be atypical chest pain and does not appear to be cardiac in nature.  I think it is safe to discharge the patient home at this point.         Impression and Disposition:  Atypical chest pain           This note was completed in part using Dragon voice recognition, and may contain word and grammatical errors.        Negro Barroso MD  05/07/19 [id number]

## 2019-05-30 ENCOUNTER — HOSPITAL ENCOUNTER (EMERGENCY)
Facility: CLINIC | Age: 84
Discharge: HOME | End: 2019-05-30
Attending: EMERGENCY MEDICINE | Admitting: EMERGENCY MEDICINE
Payer: MEDICARE

## 2019-05-30 ENCOUNTER — APPOINTMENT (OUTPATIENT)
Dept: GENERAL RADIOLOGY | Facility: CLINIC | Age: 84
End: 2019-05-30
Attending: EMERGENCY MEDICINE
Payer: MEDICARE

## 2019-05-30 VITALS
TEMPERATURE: 98.4 F | RESPIRATION RATE: 20 BRPM | HEART RATE: 85 BPM | BODY MASS INDEX: 24.61 KG/M2 | DIASTOLIC BLOOD PRESSURE: 75 MMHG | OXYGEN SATURATION: 99 % | WEIGHT: 126 LBS | SYSTOLIC BLOOD PRESSURE: 168 MMHG

## 2019-05-30 DIAGNOSIS — K59.00 CONSTIPATION, UNSPECIFIED CONSTIPATION TYPE: ICD-10-CM

## 2019-05-30 PROCEDURE — 99283 EMERGENCY DEPT VISIT LOW MDM: CPT | Performed by: EMERGENCY MEDICINE

## 2019-05-30 PROCEDURE — 25000132 ZZH RX MED GY IP 250 OP 250 PS 637: Mod: GY | Performed by: EMERGENCY MEDICINE

## 2019-05-30 PROCEDURE — 99284 EMERGENCY DEPT VISIT MOD MDM: CPT | Mod: Z6 | Performed by: EMERGENCY MEDICINE

## 2019-05-30 PROCEDURE — 74019 RADEX ABDOMEN 2 VIEWS: CPT | Mod: TC

## 2019-05-30 RX ADMIN — SODIUM PHOSPHATE 1 ENEMA: 7; 19 ENEMA RECTAL at 12:18

## 2019-05-30 NOTE — ED NOTES
Fleets Enema given, Patient up to commode immediately after enema given and had some hard stool. Patient wanting to sit on commode for awhile to see if she has more results.

## 2019-05-30 NOTE — ED AVS SNAPSHOT
[facility] Emergency Department  [address] [place] [address]  Phone:  [phone]  Fax:  [phone]                                    Janice K Goodell   MRN: [id number]    Department:  [facility] Emergency Department   Date of Visit:  5/30/2019           After Visit Summary Signature Page    I have received my discharge instructions, and my questions have been answered. I have discussed any challenges I see with this plan with the nurse or doctor.    ..........................................................................................................................................  Patient/Patient Representative Signature      ..........................................................................................................................................  Patient Representative Print Name and Relationship to Patient    ..................................................               ................................................  Date                                   Time    ..........................................................................................................................................  Reviewed by Signature/Title    ...................................................              ..............................................  Date                                               Time          22EPIC Rev 08/18

## 2019-05-30 NOTE — DISCHARGE INSTRUCTIONS
Try magnesium citrate which is over the counter medication for constipation. Miralax is another good option. Return to the er if new or worsening symptoms. Drink plenty of water.

## 2019-05-30 NOTE — ED PROVIDER NOTES
History     Chief Complaint   Patient presents with     Constipation     HPI  Janice K Goodell is a 83 year old female who has had trouble with constipation presents with no bm x 6 days.  saturaday didn't eat much. Last bm was exposive diarrhea and vomiting after being constipation for 4 days.  She took suppositories, otc meds then prunes.  This time she tried suppositories, fleet enema, prunes, last night glycerin suppository.  She has been eating but decreased. She is having a mild diffuse stomach ache it has been constant for the last day or so. Mild distension. No vomiting but felt nauseated. She has stool in the rectum that is hard. Last night had rabbit lupe hard stool. When she sits down she feels rectal fullness.    She has stage IV lung ca and has new chemo then bad reaction and switched to immunotherapy. Second treatment was Wednesday. Doctor said it could cause constipation and it causes itching.     No recent colonoscopy it was normal the last time. She has had a ct scan no abd mets, had some in LN in chest and in liver which is gone now. Lung ca is in the left lung.     Last a couple pounds but no night sweats. No blood in stool, black or tarry stool.     Allergies:  Allergies   Allergen Reactions     Sulfa Drugs      Tongue felt fat- no rash, no SOB  Other reaction(s): Edema     Adhesive Tape Rash     reacted to the adhesive on a Catarpes patch-not allergic to the Catapres-also reacted to the adhesive on leads from her Holter Monitor-not allergic to Band-Aids     Morphine Sulfate      Amoxicillin      Tongue felt fat- no rash, no SOB     Ceftriaxone Other (See Comments)     Dysphagia, but tolerates with benadryl, however has a long history of intermittent dysphagia. Unclear it is from the antibiotic     Doxycycline Swelling     Levaquin      Tongue felt fat- no rash, no SOB,     Re-trial of Levaquin made 9/2012 with angioedema reaction to lips.     Zithromax [Azithromycin Dihydrate]      Tongue  felt fat- no rash, no SOB     Thiazide-Type Diuretics Rash       Problem List:    Patient Active Problem List    Diagnosis Date Noted     community acquired pneumonia 2012     Priority: High     Anemia 2012     Priority: Medium     COPD (chronic obstructive pulmonary disease) (H) 2012     Priority: Medium     CKD (chronic kidney disease) stage 3, GFR 30-59 ml/min (H) 2012     Priority: Medium     Hypokalemia 2012     Priority: Medium     HTN (hypertension) 2012     Priority: Medium     Personal history of Lung cancer - right lobectomy 2006, chemo 2007 2012     Priority: Low        Past Medical History:    Past Medical History:   Diagnosis Date     COPD (chronic obstructive pulmonary disease) (H)      Heart valve disorder      Hypertension      Macular degeneration      Malignant neoplasm (H)      Plantar fasciitis        Past Surgical History:    Past Surgical History:   Procedure Laterality Date     APPENDECTOMY       BIOPSY  2006    Lung     CATARACT IOL, RT/LT  2008    Right eye     CHOLECYSTECTOMY       COLONOSCOPY       Colorectal surgery       ENT SURGERY      Tympanoplasty     HC REMV CATARACT EXTRACAP,INSERT LENS  09    Left eye     HERNIA REPAIR      Incarcerated, 2 surgeries     LASER YAG CAPSULOTOMY  2014    Procedure: LASER YAG CAPSULOTOMY;  Surgeon: Wes Mcbride MD;  Location: [facility] OR     Right lung - lobectomy         Family History:    No family history on file.    Social History:  Marital Status:   [5]  Social History     Tobacco Use     Smoking status: Former Smoker     Last attempt to quit: 10/12/2001     Years since quittin.6   Substance Use Topics     Alcohol use: No     Drug use: No        Medications:      albuterol (PROAIR HFA/PROVENTIL HFA/VENTOLIN HFA) 108 (90 Base) MCG/ACT inhaler   amLODIPine (NORVASC) 5 MG tablet   aspirin 325 MG tablet   BACTROBAN 2 % EX OINT   cefuroxime (CEFTIN) 500 MG tablet    cetirizine (ZYRTEC) 10 MG tablet   desonide (DESOWEN) 0.05 % cream   diltiazem ER (TIAZAC) 240 MG 24 hr ER beaded capsule   docusate sodium (STOOL SOFTENER) 100 MG capsule   ferrous gluconate (FERGON) 324 (38 Fe) MG tablet   fluticasone (FLONASE) 50 MCG/ACT spray   fluticasone-salmeterol (ADVAIR DISKUS) 500-50 MCG/DOSE diskus inhaler   furosemide (LASIX) 40 MG tablet   lisinopril (PRINIVIL/ZESTRIL) 10 MG tablet   metoprolol tartrate (LOPRESSOR) 50 MG tablet   MULTIPLE VITAMIN ESSENTIAL TABS   Multiple Vitamins-Minerals (OCUVITE PRESERVISION PO)   omeprazole (PRILOSEC) 20 MG CR capsule   oxyCODONE IR (ROXICODONE) 5 MG tablet   oxyCODONE-acetaminophen (PERCOCET) 5-325 MG tablet   potassium chloride SA (K-DUR/KLOR-CON M) 10 MEQ CR tablet   tiotropium (SPIRIVA) 18 MCG capsule   triamcinolone (KENALOG) 0.1 % paste         Review of Systems   All other systems reviewed and are negative.      Physical Exam   BP: 147/58  Pulse: 82  Heart Rate: 81  Temp: 98.4  F (36.9  C)  Resp: 20  Weight: 57.2 kg (126 lb)  SpO2: 96 %      Physical Exam   Constitutional: She is oriented to person, place, and time. She appears well-developed and well-nourished. No distress.   HENT:   Head: Normocephalic and atraumatic.   Eyes: No scleral icterus.   Neck: Normal range of motion. Neck supple.   Cardiovascular: Normal rate.   Pulmonary/Chest: Effort normal.   Abdominal: Bowel sounds are normal. She exhibits no distension and no mass. There is no tenderness. There is no rebound and no guarding.   Musculoskeletal: Normal range of motion. She exhibits no edema.   Neurological: She is alert and oriented to person, place, and time.   Skin: Skin is warm and dry. No rash noted. She is not diaphoretic. No erythema. No pallor.   Psychiatric: She has a normal mood and affect. Her behavior is normal.   Nursing note and vitals reviewed.      ED Course        Procedures                   Results for orders placed or performed during the hospital encounter  of 05/30/19 (from the past 24 hour(s))   XR Abdomen 2 Views    Narrative    XR ABDOMEN 2 VW 5/30/2019 1:08 PM    HISTORY: Constipation.    COMPARISON: None.    FINDINGS: Moderate stool in the colon. Gas pattern is nonobstructive.  No evidence of free intraperitoneal air.      Impression    IMPRESSION: Gas pattern compatible with constipation.    JAGJIT HAGEN MD       Medications   sodium phosphate (FLEET ENEMA) 1 enema (1 enema Rectal Given 5/30/19 1218)       Assessments & Plan (with Medical Decision Making)  83-year-old female with lung cancer here with constipation.  An enema was attempted with poor results.  At this time I think it is best to proceed with working from above with mag citrate.  She does not have a fever or signs of a bowel obstruction or other systemic symptoms to suggest a work-up is indicated at this time.  Return precautions were given to the patient. The differential diagnosis, treatment options, risks and follow up discussed with a competent patient and/or competent family member who agrees with the plan.       I have reviewed the nursing notes.    I have reviewed the findings, diagnosis, plan and need for follow up with the patient.         Medication List      There are no discharge medications for this visit.         Final diagnoses:   Constipation, unspecified constipation type       5/30/2019   [facility] EMERGENCY DEPARTMENT     Jaylon Roman MD  05/30/19 0597

## 2019-06-24 ENCOUNTER — HOSPITAL ENCOUNTER (OUTPATIENT)
Facility: CLINIC | Age: 84
Setting detail: OBSERVATION
Discharge: NURSING, REHAB OR LONG-TERM CARE | End: 2019-06-26
Attending: NURSE PRACTITIONER | Admitting: INTERNAL MEDICINE
Payer: MEDICARE

## 2019-06-24 ENCOUNTER — APPOINTMENT (OUTPATIENT)
Dept: GENERAL RADIOLOGY | Facility: CLINIC | Age: 84
End: 2019-06-24
Attending: NURSE PRACTITIONER
Payer: MEDICARE

## 2019-06-24 ENCOUNTER — APPOINTMENT (OUTPATIENT)
Dept: CT IMAGING | Facility: CLINIC | Age: 84
End: 2019-06-24
Attending: NURSE PRACTITIONER
Payer: MEDICARE

## 2019-06-24 DIAGNOSIS — R39.15 URGENCY OF URINATION: ICD-10-CM

## 2019-06-24 DIAGNOSIS — Z87.891 HISTORY OF SMOKING: ICD-10-CM
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
## 2019-06-24 DIAGNOSIS — R42 LIGHT HEADED: ICD-10-CM

## 2019-06-24 DIAGNOSIS — C78.7 SECONDARY MALIGNANT NEOPLASM OF LIVER (H): ICD-10-CM

## 2019-06-24 DIAGNOSIS — S09.90XA INJURY OF HEAD, INITIAL ENCOUNTER: ICD-10-CM

## 2019-06-24 DIAGNOSIS — K59.00 CONSTIPATION, UNSPECIFIED CONSTIPATION TYPE: Primary | ICD-10-CM

## 2019-06-24 DIAGNOSIS — R53.1 WEAKNESS: ICD-10-CM

## 2019-06-24 DIAGNOSIS — E83.42 HYPOMAGNESEMIA: ICD-10-CM

## 2019-06-24 DIAGNOSIS — C34.11 MALIGNANT NEOPLASM OF UPPER LOBE OF RIGHT LUNG (H): ICD-10-CM

## 2019-06-24 DIAGNOSIS — Z79.01 LONG TERM (CURRENT) USE OF ANTICOAGULANTS: ICD-10-CM

## 2019-06-24 DIAGNOSIS — J44.9 CHRONIC OBSTRUCTIVE PULMONARY DISEASE, UNSPECIFIED COPD TYPE (H): ICD-10-CM

## 2019-06-24 PROBLEM — M62.81 GENERALIZED MUSCLE WEAKNESS: Status: ACTIVE | Noted: 2019-06-24

## 2019-06-24 PROBLEM — E87.8 HYPOCHLOREMIA: Status: ACTIVE | Noted: 2019-06-24

## 2019-06-24 PROBLEM — E87.1 HYPONATREMIA: Status: ACTIVE | Noted: 2019-06-24

## 2019-06-24 LAB
ALBUMIN SERPL-MCNC: 3.2 G/DL (ref 3.4–5)
ALBUMIN UR-MCNC: 30 MG/DL
ALP SERPL-CCNC: 101 U/L (ref 40–150)
ALT SERPL W P-5'-P-CCNC: 33 U/L (ref 0–50)
ANION GAP SERPL CALCULATED.3IONS-SCNC: 5 MMOL/L (ref 3–14)
APPEARANCE UR: CLEAR
AST SERPL W P-5'-P-CCNC: 23 U/L (ref 0–45)
BASOPHILS # BLD AUTO: 0 10E9/L (ref 0–0.2)
BASOPHILS NFR BLD AUTO: 0.2 %
BILIRUB SERPL-MCNC: 0.3 MG/DL (ref 0.2–1.3)
BILIRUB UR QL STRIP: NEGATIVE
BUN SERPL-MCNC: 13 MG/DL (ref 7–30)
CALCIUM SERPL-MCNC: 10 MG/DL (ref 8.5–10.1)
CHLORIDE SERPL-SCNC: 79 MMOL/L (ref 94–109)
CO2 SERPL-SCNC: 45 MMOL/L (ref 20–32)
COLOR UR AUTO: YELLOW
CREAT SERPL-MCNC: 0.88 MG/DL (ref 0.52–1.04)
CRP SERPL-MCNC: 8 MG/L (ref 0–8)
DIFFERENTIAL METHOD BLD: ABNORMAL
EOSINOPHIL NFR BLD AUTO: 2.5 %
ERYTHROCYTE [DISTWIDTH] IN BLOOD BY AUTOMATED COUNT: 13.2 % (ref 10–15)
GFR SERPL CREATININE-BSD FRML MDRD: 62 ML/MIN/{1.73_M2}
GLUCOSE SERPL-MCNC: 143 MG/DL (ref 70–99)
GLUCOSE UR STRIP-MCNC: NEGATIVE MG/DL
HCT VFR BLD AUTO: 33.2 % (ref 35–47)
HGB BLD-MCNC: 10.6 G/DL (ref 11.7–15.7)
HGB UR QL STRIP: NEGATIVE
HYALINE CASTS #/AREA URNS LPF: 12 /LPF (ref 0–2)
IMM GRANULOCYTES # BLD: 0 10E9/L (ref 0–0.4)
IMM GRANULOCYTES NFR BLD: 0.4 %
KETONES UR STRIP-MCNC: NEGATIVE MG/DL
LEUKOCYTE ESTERASE UR QL STRIP: NEGATIVE
LYMPHOCYTES # BLD AUTO: 1.3 10E9/L (ref 0.8–5.3)
LYMPHOCYTES NFR BLD AUTO: 11.6 %
MAGNESIUM SERPL-MCNC: 1.6 MG/DL (ref 1.6–2.3)
MCH RBC QN AUTO: 29.9 PG (ref 26.5–33)
MCHC RBC AUTO-ENTMCNC: 31.9 G/DL (ref 31.5–36.5)
MCV RBC AUTO: 94 FL (ref 78–100)
MONOCYTES # BLD AUTO: 1.3 10E9/L (ref 0–1.3)
MONOCYTES NFR BLD AUTO: 11.1 %
MUCOUS THREADS #/AREA URNS LPF: PRESENT /LPF
NEUTROPHILS # BLD AUTO: 8.5 10E9/L (ref 1.6–8.3)
NEUTROPHILS NFR BLD AUTO: 74.2 %
NITRATE UR QL: NEGATIVE
NRBC # BLD AUTO: 0 10*3/UL
NRBC BLD AUTO-RTO: 0 /100
PH UR STRIP: 7 PH (ref 5–7)
PHOSPHATE SERPL-MCNC: 3.9 MG/DL (ref 2.5–4.5)
PLATELET # BLD AUTO: 271 10E9/L (ref 150–450)
POTASSIUM SERPL-SCNC: 3.4 MMOL/L (ref 3.4–5.3)
PROT SERPL-MCNC: 6.8 G/DL (ref 6.8–8.8)
RBC # BLD AUTO: 3.55 10E12/L (ref 3.8–5.2)
RBC #/AREA URNS AUTO: <1 /HPF (ref 0–2)
SODIUM SERPL-SCNC: 129 MMOL/L (ref 133–144)
SOURCE: ABNORMAL
SP GR UR STRIP: 1.01 (ref 1–1.03)
SQUAMOUS #/AREA URNS AUTO: 2 /HPF (ref 0–1)
TSH SERPL DL<=0.005 MIU/L-ACNC: 2.13 MU/L (ref 0.4–4)
UROBILINOGEN UR STRIP-MCNC: 0 MG/DL (ref 0–2)
WBC # BLD AUTO: 11.4 10E9/L (ref 4–11)
WBC #/AREA URNS AUTO: 3 /HPF (ref 0–5)

## 2019-06-24 PROCEDURE — 86140 C-REACTIVE PROTEIN: CPT | Performed by: NURSE PRACTITIONER

## 2019-06-24 PROCEDURE — 85025 COMPLETE CBC W/AUTO DIFF WBC: CPT | Performed by: NURSE PRACTITIONER

## 2019-06-24 PROCEDURE — 99285 EMERGENCY DEPT VISIT HI MDM: CPT | Mod: Z6 | Performed by: FAMILY MEDICINE

## 2019-06-24 PROCEDURE — 96361 HYDRATE IV INFUSION ADD-ON: CPT | Performed by: FAMILY MEDICINE

## 2019-06-24 PROCEDURE — 99207 ZZC CDG-CODE CATEGORY CHANGED: CPT | Performed by: INTERNAL MEDICINE

## 2019-06-24 PROCEDURE — 81001 URINALYSIS AUTO W/SCOPE: CPT | Performed by: NURSE PRACTITIONER

## 2019-06-24 PROCEDURE — 25000125 ZZHC RX 250

## 2019-06-24 PROCEDURE — 84100 ASSAY OF PHOSPHORUS: CPT | Performed by: NURSE PRACTITIONER

## 2019-06-24 PROCEDURE — 99285 EMERGENCY DEPT VISIT HI MDM: CPT | Mod: 25 | Performed by: FAMILY MEDICINE

## 2019-06-24 PROCEDURE — 84443 ASSAY THYROID STIM HORMONE: CPT | Performed by: NURSE PRACTITIONER

## 2019-06-24 PROCEDURE — 83735 ASSAY OF MAGNESIUM: CPT | Performed by: NURSE PRACTITIONER

## 2019-06-24 PROCEDURE — 80053 COMPREHEN METABOLIC PANEL: CPT | Performed by: NURSE PRACTITIONER

## 2019-06-24 PROCEDURE — 25000128 H RX IP 250 OP 636: Performed by: INTERNAL MEDICINE

## 2019-06-24 PROCEDURE — 12000000 ZZH R&B MED SURG/OB

## 2019-06-24 PROCEDURE — 25000128 H RX IP 250 OP 636: Performed by: NURSE PRACTITIONER

## 2019-06-24 PROCEDURE — 96374 THER/PROPH/DIAG INJ IV PUSH: CPT | Performed by: FAMILY MEDICINE

## 2019-06-24 PROCEDURE — 99220 ZZC INITIAL OBSERVATION CARE,LEVL III: CPT | Performed by: INTERNAL MEDICINE

## 2019-06-24 PROCEDURE — 70450 CT HEAD/BRAIN W/O DYE: CPT

## 2019-06-24 PROCEDURE — 71046 X-RAY EXAM CHEST 2 VIEWS: CPT | Mod: TC

## 2019-06-24 RX ORDER — AMOXICILLIN 250 MG
2 CAPSULE ORAL
Status: DISCONTINUED | OUTPATIENT
Start: 2019-06-24 | End: 2019-06-26

## 2019-06-24 RX ORDER — FLUTICASONE PROPIONATE 50 MCG
1 SPRAY, SUSPENSION (ML) NASAL DAILY
Status: DISCONTINUED | OUTPATIENT
Start: 2019-06-25 | End: 2019-06-26 | Stop reason: HOSPADM

## 2019-06-24 RX ORDER — DOCUSATE SODIUM 100 MG/1
100 CAPSULE, LIQUID FILLED ORAL DAILY
Status: DISCONTINUED | OUTPATIENT
Start: 2019-06-25 | End: 2019-06-24

## 2019-06-24 RX ORDER — LIDOCAINE 40 MG/G
CREAM TOPICAL
Status: DISCONTINUED | OUTPATIENT
Start: 2019-06-24 | End: 2019-06-26 | Stop reason: HOSPADM

## 2019-06-24 RX ORDER — ONDANSETRON 2 MG/ML
4 INJECTION INTRAMUSCULAR; INTRAVENOUS EVERY 6 HOURS PRN
Status: DISCONTINUED | OUTPATIENT
Start: 2019-06-24 | End: 2019-06-26 | Stop reason: HOSPADM

## 2019-06-24 RX ORDER — LANOLIN ALCOHOL/MO/W.PET/CERES
800 CREAM (GRAM) TOPICAL DAILY
Status: DISCONTINUED | OUTPATIENT
Start: 2019-06-25 | End: 2019-06-26 | Stop reason: HOSPADM

## 2019-06-24 RX ORDER — PROCHLORPERAZINE 25 MG
12.5 SUPPOSITORY, RECTAL RECTAL EVERY 12 HOURS PRN
Status: DISCONTINUED | OUTPATIENT
Start: 2019-06-24 | End: 2019-06-26 | Stop reason: HOSPADM

## 2019-06-24 RX ORDER — PROCHLORPERAZINE MALEATE 5 MG
5 TABLET ORAL EVERY 6 HOURS PRN
Status: DISCONTINUED | OUTPATIENT
Start: 2019-06-24 | End: 2019-06-26 | Stop reason: HOSPADM

## 2019-06-24 RX ORDER — ONDANSETRON 4 MG/1
4 TABLET, ORALLY DISINTEGRATING ORAL EVERY 6 HOURS PRN
Status: DISCONTINUED | OUTPATIENT
Start: 2019-06-24 | End: 2019-06-26 | Stop reason: HOSPADM

## 2019-06-24 RX ORDER — DILTIAZEM HYDROCHLORIDE 120 MG/1
240 CAPSULE, COATED, EXTENDED RELEASE ORAL DAILY
Status: DISCONTINUED | OUTPATIENT
Start: 2019-06-25 | End: 2019-06-26 | Stop reason: HOSPADM

## 2019-06-24 RX ORDER — OXYCODONE AND ACETAMINOPHEN 5; 325 MG/1; MG/1
1 TABLET ORAL EVERY 8 HOURS PRN
Status: DISCONTINUED | OUTPATIENT
Start: 2019-06-24 | End: 2019-06-26 | Stop reason: HOSPADM

## 2019-06-24 RX ORDER — LIDOCAINE 40 MG/G
CREAM TOPICAL
Status: DISCONTINUED | OUTPATIENT
Start: 2019-06-24 | End: 2019-06-24

## 2019-06-24 RX ORDER — ALBUTEROL SULFATE 90 UG/1
2 AEROSOL, METERED RESPIRATORY (INHALATION) 4 TIMES DAILY PRN
Status: DISCONTINUED | OUTPATIENT
Start: 2019-06-24 | End: 2019-06-26 | Stop reason: HOSPADM

## 2019-06-24 RX ORDER — LISINOPRIL 10 MG/1
20 TABLET ORAL 2 TIMES DAILY
Status: DISCONTINUED | OUTPATIENT
Start: 2019-06-24 | End: 2019-06-26 | Stop reason: HOSPADM

## 2019-06-24 RX ORDER — POLYETHYLENE GLYCOL 3350 17 G/17G
17 POWDER, FOR SOLUTION ORAL DAILY PRN
Status: DISCONTINUED | OUTPATIENT
Start: 2019-06-24 | End: 2019-06-26

## 2019-06-24 RX ORDER — ACETAMINOPHEN 325 MG/1
650 TABLET ORAL EVERY 4 HOURS PRN
Status: DISCONTINUED | OUTPATIENT
Start: 2019-06-24 | End: 2019-06-26 | Stop reason: HOSPADM

## 2019-06-24 RX ORDER — MULTIPLE VITAMINS W/ MINERALS TAB 9MG-400MCG
1 TAB ORAL DAILY
Status: DISCONTINUED | OUTPATIENT
Start: 2019-06-25 | End: 2019-06-26 | Stop reason: HOSPADM

## 2019-06-24 RX ORDER — HEPARIN SODIUM,PORCINE 10 UNIT/ML
5-10 VIAL (ML) INTRAVENOUS
Status: DISCONTINUED | OUTPATIENT
Start: 2019-06-24 | End: 2019-06-26 | Stop reason: HOSPADM

## 2019-06-24 RX ORDER — POTASSIUM CHLORIDE 1500 MG/1
20 TABLET, EXTENDED RELEASE ORAL 2 TIMES DAILY
Status: DISCONTINUED | OUTPATIENT
Start: 2019-06-24 | End: 2019-06-26 | Stop reason: HOSPADM

## 2019-06-24 RX ORDER — HEPARIN SODIUM (PORCINE) LOCK FLUSH IV SOLN 100 UNIT/ML 100 UNIT/ML
5 SOLUTION INTRAVENOUS
Status: DISCONTINUED | OUTPATIENT
Start: 2019-06-24 | End: 2019-06-26 | Stop reason: HOSPADM

## 2019-06-24 RX ORDER — MAGNESIUM OXIDE 400 MG/1
400 TABLET ORAL 2 TIMES DAILY
Status: DISCONTINUED | OUTPATIENT
Start: 2019-06-24 | End: 2019-06-26 | Stop reason: HOSPADM

## 2019-06-24 RX ORDER — HEPARIN SODIUM,PORCINE 10 UNIT/ML
5-10 VIAL (ML) INTRAVENOUS EVERY 24 HOURS
Status: DISCONTINUED | OUTPATIENT
Start: 2019-06-24 | End: 2019-06-26 | Stop reason: HOSPADM

## 2019-06-24 RX ORDER — NALOXONE HYDROCHLORIDE 0.4 MG/ML
.1-.4 INJECTION, SOLUTION INTRAMUSCULAR; INTRAVENOUS; SUBCUTANEOUS
Status: DISCONTINUED | OUTPATIENT
Start: 2019-06-24 | End: 2019-06-26 | Stop reason: HOSPADM

## 2019-06-24 RX ORDER — LISINOPRIL 10 MG/1
10 TABLET ORAL 2 TIMES DAILY
Status: DISCONTINUED | OUTPATIENT
Start: 2019-06-24 | End: 2019-06-24

## 2019-06-24 RX ORDER — METOPROLOL TARTRATE 100 MG
100 TABLET ORAL 2 TIMES DAILY
Status: DISCONTINUED | OUTPATIENT
Start: 2019-06-24 | End: 2019-06-25

## 2019-06-24 RX ORDER — FOLIC ACID 0.8 MG
800 TABLET ORAL DAILY
COMMUNITY

## 2019-06-24 RX ORDER — SODIUM CHLORIDE AND POTASSIUM CHLORIDE 150; 900 MG/100ML; MG/100ML
INJECTION, SOLUTION INTRAVENOUS CONTINUOUS
Status: DISPENSED | OUTPATIENT
Start: 2019-06-24 | End: 2019-06-25

## 2019-06-24 RX ORDER — LIDOCAINE 40 MG/G
CREAM TOPICAL
Status: COMPLETED
Start: 2019-06-24 | End: 2019-06-24

## 2019-06-24 RX ORDER — ONDANSETRON 2 MG/ML
4 INJECTION INTRAMUSCULAR; INTRAVENOUS EVERY 30 MIN PRN
Status: DISCONTINUED | OUTPATIENT
Start: 2019-06-24 | End: 2019-06-24

## 2019-06-24 RX ADMIN — LIDOCAINE: 40 CREAM TOPICAL at 17:47

## 2019-06-24 RX ADMIN — SODIUM CHLORIDE 1000 ML: 9 INJECTION, SOLUTION INTRAVENOUS at 18:43

## 2019-06-24 RX ADMIN — ONDANSETRON 4 MG: 2 INJECTION INTRAMUSCULAR; INTRAVENOUS at 18:47

## 2019-06-24 RX ADMIN — POTASSIUM CHLORIDE AND SODIUM CHLORIDE: 900; 150 INJECTION, SOLUTION INTRAVENOUS at 23:38

## 2019-06-24 ASSESSMENT — ENCOUNTER SYMPTOMS
APPETITE CHANGE: 1
CARDIOVASCULAR NEGATIVE: 1
WEAKNESS: 1
ACTIVITY CHANGE: 1
CONSTIPATION: 1
COUGH: 1
CHILLS: 0
FEVER: 0
CONFUSION: 1
DIARRHEA: 1
FATIGUE: 1
NAUSEA: 1

## 2019-06-24 ASSESSMENT — MIFFLIN-ST. JEOR
SCORE: 958.03
SCORE: 926.5

## 2019-06-24 NOTE — ED PROVIDER NOTES
History     Chief Complaint   Patient presents with     Generalized Weakness     HPI  Janice K Goodell is a 83 year old female who presents to the emergency department with increasing weakness and confusion.  Pt was seen in the presence of her adult daughter whom she lives with.  Friday patient started feeling sicker with constipation and nausea, Sat she had nausea and vomiting and increasing weakness.  Sunday she was very sleepy, fatigued and lethargic.  She has been needing a walker or assistance since Sunday when usually she is independent.  She also fell yesterday, reported her slippers got stuck and she feel on the front of her head and maybe her arm.  She is on xarelto for a-fib.  Daughter does not feel the confusion is related to the fall. Pt denies loosing consciousness after fall.  Pt daughter reports she has been like this before.  She did get a brain MRI about 1 month ago r/t confusion.  Pt also has no appetite and not drinking well.  She feels full all of the time, nauseated and nothing tastes good. Pt was just started on reglan on Friday, she seen GI and they also advised on the colonoscopy prep medications to help with her chronic/acute constipation.  Pt daughter also reporting that she has not been making it to the bathroom as she is having urinary urgency.  Pt also reporting increased cough more than her normal with her history of COPD.    Allergies:  Allergies   Allergen Reactions     Sulfa Drugs      Tongue felt fat- no rash, no SOB  Other reaction(s): Edema     Adhesive Tape Rash     reacted to the adhesive on a Catarpes patch-not allergic to the Catapres-also reacted to the adhesive on leads from her Holter Monitor-not allergic to Band-Aids     Morphine Sulfate      Amoxicillin      Tongue felt fat- no rash, no SOB     Ceftriaxone Other (See Comments)     Dysphagia, but tolerates with benadryl, however has a long history of intermittent dysphagia. Unclear it is from the antibiotic      Doxycycline Swelling     Levaquin      Tongue felt fat- no rash, no SOB,     Re-trial of Levaquin made 2012 with angioedema reaction to lips.     Zithromax [Azithromycin Dihydrate]      Tongue felt fat- no rash, no SOB     Thiazide-Type Diuretics Rash       Problem List:    Patient Active Problem List    Diagnosis Date Noted     community acquired pneumonia 2012     Priority: High     Anemia 2012     Priority: Medium     COPD (chronic obstructive pulmonary disease) (H) 2012     Priority: Medium     CKD (chronic kidney disease) stage 3, GFR 30-59 ml/min (H) 2012     Priority: Medium     Hypokalemia 2012     Priority: Medium     HTN (hypertension) 2012     Priority: Medium     Personal history of Lung cancer - right lobectomy , chemo 2007 2012     Priority: Low        Past Medical History:    Past Medical History:   Diagnosis Date     COPD (chronic obstructive pulmonary disease) (H)      Heart valve disorder      Hypertension      Macular degeneration      Malignant neoplasm (H) 2006     Plantar fasciitis        Past Surgical History:    Past Surgical History:   Procedure Laterality Date     APPENDECTOMY       BIOPSY  2006    Lung     CATARACT IOL, RT/LT  2008    Right eye     CHOLECYSTECTOMY       COLONOSCOPY       Colorectal surgery       ENT SURGERY      Tympanoplasty     HC REMV CATARACT EXTRACAP,INSERT LENS  09    Left eye     HERNIA REPAIR      Incarcerated, 2 surgeries     LASER YAG CAPSULOTOMY  2014    Procedure: LASER YAG CAPSULOTOMY;  Surgeon: Wes Mcbride MD;  Location: [facility]     Right lung - lobectomy         Family History:    No family history on file.    Social History:  Marital Status:   [5]  Social History     Tobacco Use     Smoking status: Former Smoker     Last attempt to quit: 10/12/2001     Years since quittin.7   Substance Use Topics     Alcohol use: No     Drug use: No        Medications:      albuterol  (PROAIR HFA/PROVENTIL HFA/VENTOLIN HFA) 108 (90 Base) MCG/ACT inhaler   amLODIPine (NORVASC) 5 MG tablet   aspirin 325 MG tablet   BACTROBAN 2 % EX OINT   cefuroxime (CEFTIN) 500 MG tablet   cetirizine (ZYRTEC) 10 MG tablet   desonide (DESOWEN) 0.05 % cream   diltiazem ER (TIAZAC) 240 MG 24 hr ER beaded capsule   docusate sodium (STOOL SOFTENER) 100 MG capsule   ferrous gluconate (FERGON) 324 (38 Fe) MG tablet   fluticasone (FLONASE) 50 MCG/ACT spray   fluticasone-salmeterol (ADVAIR DISKUS) 500-50 MCG/DOSE diskus inhaler   furosemide (LASIX) 40 MG tablet   lisinopril (PRINIVIL/ZESTRIL) 10 MG tablet   metoprolol tartrate (LOPRESSOR) 50 MG tablet   MULTIPLE VITAMIN ESSENTIAL TABS   Multiple Vitamins-Minerals (OCUVITE PRESERVISION PO)   omeprazole (PRILOSEC) 20 MG CR capsule   oxyCODONE IR (ROXICODONE) 5 MG tablet   oxyCODONE-acetaminophen (PERCOCET) 5-325 MG tablet   potassium chloride SA (K-DUR/KLOR-CON M) 10 MEQ CR tablet   tiotropium (SPIRIVA) 18 MCG capsule   triamcinolone (KENALOG) 0.1 % paste         Review of Systems   Constitutional: Positive for activity change, appetite change and fatigue. Negative for chills and fever.   HENT:        Recent head trauma- yesterday fell and hit head, small bruise above Right eye     Respiratory: Positive for cough.         Increased cough from normal   Cardiovascular: Negative.    Gastrointestinal: Positive for constipation, diarrhea and nausea.        Had constipation and did a colonscopy prep clean out in the past 2 days, poor appetite and feels full   Genitourinary: Positive for urgency.   Neurological: Positive for weakness.   Psychiatric/Behavioral: Positive for confusion.       Physical Exam   BP: 149/72  Pulse: 77  Temp: 98.3  F (36.8  C)  Resp: 17  Height: 152.4 cm (5')  Weight: 57.2 kg (126 lb)  SpO2: 98 %      Physical Exam   Constitutional: She is oriented to person, place, and time. She appears well-developed. No distress.   HENT:   Head: Normocephalic.   Small  bruise above Right eye   Eyes: Pupils are equal, round, and reactive to light. EOM are normal.   Neck: Normal range of motion.   Cardiovascular: Normal rate, regular rhythm and normal heart sounds.   Pulmonary/Chest: Effort normal and breath sounds normal.   Hx right upper lobe lobectomy   Abdominal: Soft. Bowel sounds are normal. There is tenderness.   Mild tenderness along colon tract   Musculoskeletal:   Generalized weakness, equal strengths   Neurological: She is alert and oriented to person, place, and time.   Skin: Skin is warm and dry. Capillary refill takes less than 2 seconds.       ED Course        Procedures      Results for orders placed or performed during the hospital encounter of 06/24/19 (from the past 24 hour(s))   XR Chest 2 Views    Narrative    CHEST TWO VIEWS 6/24/2019 6:04 PM     HISTORY: Cough, chronic obstructive pulmonary disease history as well  as lung cancer history.    COMPARISON: May 7, 2019     FINDINGS: No significant residual fluid on the left compatible with  improved effusion. No pneumothorax. Hyperexpansion changes. Minimal  scattered interstitial changes appear similar. No new infiltrates. The  cardiac silhouette is not enlarged. Pulmonary vasculature is  unremarkable.      Impression    IMPRESSION: Improved left effusion since the comparison study.    ALYSSA NAILS MD   CT Head w/o Contrast    Narrative    CT SCAN OF THE HEAD WITHOUT CONTRAST   6/24/2019 6:12 PM     HISTORY: Fall, hit head, on Xarelto.    TECHNIQUE:  Axial images of the head and coronal reformations without  IV contrast material.  Radiation dose for this scan was reduced using  automated exposure control, adjustment of the mA and/or kV according  to patient size, or iterative reconstruction technique.    COMPARISON: None.    FINDINGS: Mild to moderate cerebral atrophy is present. Moderately  extensive white matter changes are seen in both hemispheres without  mass effect. There is no evidence for intracranial  hemorrhage, mass  effect, acute infarct, or skull fracture. Vascular calcifications  noted. Visualized paranasal sinuses and mastoid air cells are clear.      Impression    IMPRESSION: Chronic changes. No evidence for intracranial hemorrhage  or acute infarct.   UA with Microscopic   Result Value Ref Range    Color Urine Yellow     Appearance Urine Clear     Glucose Urine Negative NEG^Negative mg/dL    Bilirubin Urine Negative NEG^Negative    Ketones Urine Negative NEG^Negative mg/dL    Specific Gravity Urine 1.006 1.003 - 1.035    Blood Urine Negative NEG^Negative    pH Urine 7.0 5.0 - 7.0 pH    Protein Albumin Urine 30 (A) NEG^Negative mg/dL    Urobilinogen mg/dL 0.0 0.0 - 2.0 mg/dL    Nitrite Urine Negative NEG^Negative    Leukocyte Esterase Urine Negative NEG^Negative    Source Unspecified Urine     WBC Urine 3 0 - 5 /HPF    RBC Urine <1 0 - 2 /HPF    Squamous Epithelial /HPF Urine 2 (H) 0 - 1 /HPF    Mucous Urine Present (A) NEG^Negative /LPF    Hyaline Casts 12 (H) 0 - 2 /LPF   CBC with platelets differential   Result Value Ref Range    WBC 11.4 (H) 4.0 - 11.0 10e9/L    RBC Count 3.55 (L) 3.8 - 5.2 10e12/L    Hemoglobin 10.6 (L) 11.7 - 15.7 g/dL    Hematocrit 33.2 (L) 35.0 - 47.0 %    MCV 94 78 - 100 fl    MCH 29.9 26.5 - 33.0 pg    MCHC 31.9 31.5 - 36.5 g/dL    RDW 13.2 10.0 - 15.0 %    Platelet Count 271 150 - 450 10e9/L    Diff Method Automated Method     % Neutrophils 74.2 %    % Lymphocytes 11.6 %    % Monocytes 11.1 %    % Eosinophils 2.5 %    % Basophils 0.2 %    % Immature Granulocytes 0.4 %    Nucleated RBCs 0 0 /100    Absolute Neutrophil 8.5 (H) 1.6 - 8.3 10e9/L    Absolute Lymphocytes 1.3 0.8 - 5.3 10e9/L    Absolute Monocytes 1.3 0.0 - 1.3 10e9/L    Absolute Basophils 0.0 0.0 - 0.2 10e9/L    Abs Immature Granulocytes 0.0 0 - 0.4 10e9/L    Absolute Nucleated RBC 0.0    Comprehensive metabolic panel   Result Value Ref Range    Sodium 129 (L) 133 - 144 mmol/L    Potassium 3.4 3.4 - 5.3 mmol/L     Chloride 79 (L) 94 - 109 mmol/L    Carbon Dioxide 45 (H) 20 - 32 mmol/L    Anion Gap 5 3 - 14 mmol/L    Glucose 143 (H) 70 - 99 mg/dL    Urea Nitrogen 13 7 - 30 mg/dL    Creatinine 0.88 0.52 - 1.04 mg/dL    GFR Estimate 62 >60 mL/min/[1.73_m2]    GFR Estimate If Black 71 >60 mL/min/[1.73_m2]    Calcium 10.0 8.5 - 10.1 mg/dL    Bilirubin Total 0.3 0.2 - 1.3 mg/dL    Albumin 3.2 (L) 3.4 - 5.0 g/dL    Protein Total 6.8 6.8 - 8.8 g/dL    Alkaline Phosphatase 101 40 - 150 U/L    ALT 33 0 - 50 U/L    AST 23 0 - 45 U/L   CRP inflammation   Result Value Ref Range    CRP Inflammation 8.0 0.0 - 8.0 mg/L   TSH with free T4 reflex   Result Value Ref Range    TSH 2.13 0.40 - 4.00 mU/L       Medications   ondansetron (ZOFRAN) injection 4 mg (4 mg Intravenous Given 6/24/19 1847)   lidocaine 1 % 0.1-1 mL (has no administration in time range)   lidocaine (LMX4) kit (has no administration in time range)   sodium chloride (PF) 0.9% PF flush 10-20 mL (has no administration in time range)   heparin lock flush 10 UNIT/ML injection 5-10 mL (has no administration in time range)   heparin lock flush 10 UNIT/ML injection 5-10 mL (has no administration in time range)   heparin 100 UNIT/ML injection 5 mL (has no administration in time range)   sodium chloride (PF) 0.9% PF flush 10-20 mL (has no administration in time range)   0.9% sodium chloride BOLUS (0 mLs Intravenous Stopped 6/24/19 1941)   lidocaine (LMX4) 4 % kit (  Given 6/24/19 1747)       Assessments & Plan (with Medical Decision Making)  Master is a 83 year old female who presents to the Emergency Department with past medical history of 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    She presents with increasing weakness and confusion over the last 4 days including a fall yesterday in which she hit her head.  Pt has gone from walking independently to walking with assistance or a walker.  She also has increased problems with urgency of urination and increased  "cough without fever.  Concerns for intracranial hemorrhage given patient's current anticoagulated state versus acute infectious etiology.  CT scan is negative for any acute findings.  Chest x-ray was obtained and is actually improved from prior with proved left pleural effusion.  Blood work today returns with mild leukocytosis, left shift hemoglobin stable for patient at 10.6, CRP normal at 8, TSH normal at 2.13, CMP returns with a sodium of 129, chloride of 79, glucose of 143 and a carbon dioxide of 45.  Albumin is mildly low at 3.2.  Wondering if patient's chloride level is from her constipation treatment with recommend GI \"colon prep\".  This may also be attributing to her weakness today.  Urinalysis was obtained and is negative for infection, magnesium and phosphorus levels are within normal.  Reviewed imaging and labs with patient and daughter.  Advised there is no clear diagnosis for her weakness or confusion.  Patient's daughter does not feel comfortable with pt at home with her due to her weakness as patient is unable to care for herself.  Pt will be admitted for weakness and deconditioning.  Likely physical therapy occupational therapy consult with disposition planning depending on how patient improves.  Patient and daughter updated on plan of care, they are agreeable, patient accepted for admission by Dr. Corey, hospitalist, staff in the emergency department with Dr. Lundberg.     I have reviewed the nursing notes.    I have reviewed the findings, diagnosis, plan and need for follow up with the patient.       Medication List      There are no discharge medications for this visit.         Final diagnoses:   Weakness   Light headed   Malignant neoplasm of upper lobe of right lung (H)   Chronic obstructive pulmonary disease, unspecified COPD type (H)       6/24/2019   [facility] EMERGENCY DEPARTMENT     Grace Velez, ELIZA CNP  06/24/19 2113    "

## 2019-06-24 NOTE — ED NOTES
Accessed right chest wall Power Port with 19 gauge 0.75in with blood return.  Labs drawn and IVF started.

## 2019-06-24 NOTE — ED TRIAGE NOTES
Generalized weakness and fatigue.  Having problems with constipation and did a bowel clean out and seems to have helped.  Is unsteady and fell yesterday.  Wanting her thyroid checked. Home oxygen user at 3Liters.  Daughter has noticed some confusion as well

## 2019-06-25 ENCOUNTER — APPOINTMENT (OUTPATIENT)
Dept: GENERAL RADIOLOGY | Facility: CLINIC | Age: 84
End: 2019-06-25
Attending: INTERNAL MEDICINE
Payer: MEDICARE

## 2019-06-25 ENCOUNTER — APPOINTMENT (OUTPATIENT)
Dept: PHYSICAL THERAPY | Facility: CLINIC | Age: 84
End: 2019-06-25
Attending: INTERNAL MEDICINE
Payer: MEDICARE

## 2019-06-25 ENCOUNTER — APPOINTMENT (OUTPATIENT)
Dept: OCCUPATIONAL THERAPY | Facility: CLINIC | Age: 84
End: 2019-06-25
Attending: INTERNAL MEDICINE
Payer: MEDICARE

## 2019-06-25 PROBLEM — R53.1 WEAKNESS: Status: ACTIVE | Noted: 2019-06-25

## 2019-06-25 LAB
ANION GAP SERPL CALCULATED.3IONS-SCNC: 3 MMOL/L (ref 3–14)
BASOPHILS # BLD AUTO: 0 10E9/L (ref 0–0.2)
BASOPHILS NFR BLD AUTO: 0.2 %
BUN SERPL-MCNC: 14 MG/DL (ref 7–30)
CALCIUM SERPL-MCNC: 9.8 MG/DL (ref 8.5–10.1)
CHLORIDE SERPL-SCNC: 88 MMOL/L (ref 94–109)
CO2 SERPL-SCNC: 44 MMOL/L (ref 20–32)
CREAT SERPL-MCNC: 0.87 MG/DL (ref 0.52–1.04)
DIFFERENTIAL METHOD BLD: ABNORMAL
EOSINOPHIL NFR BLD AUTO: 1.6 %
ERYTHROCYTE [DISTWIDTH] IN BLOOD BY AUTOMATED COUNT: 13.2 % (ref 10–15)
GFR SERPL CREATININE-BSD FRML MDRD: 62 ML/MIN/{1.73_M2}
GLUCOSE SERPL-MCNC: 117 MG/DL (ref 70–99)
HCT VFR BLD AUTO: 30.7 % (ref 35–47)
HGB BLD-MCNC: 9.8 G/DL (ref 11.7–15.7)
IMM GRANULOCYTES # BLD: 0.1 10E9/L (ref 0–0.4)
IMM GRANULOCYTES NFR BLD: 0.5 %
LYMPHOCYTES # BLD AUTO: 1.4 10E9/L (ref 0.8–5.3)
LYMPHOCYTES NFR BLD AUTO: 10.5 %
MAGNESIUM SERPL-MCNC: 1.6 MG/DL (ref 1.6–2.3)
MCH RBC QN AUTO: 30.3 PG (ref 26.5–33)
MCHC RBC AUTO-ENTMCNC: 31.9 G/DL (ref 31.5–36.5)
MCV RBC AUTO: 95 FL (ref 78–100)
MONOCYTES # BLD AUTO: 1.3 10E9/L (ref 0–1.3)
MONOCYTES NFR BLD AUTO: 9.4 %
NEUTROPHILS # BLD AUTO: 10.3 10E9/L (ref 1.6–8.3)
NEUTROPHILS NFR BLD AUTO: 77.8 %
NRBC # BLD AUTO: 0 10*3/UL
NRBC BLD AUTO-RTO: 0 /100
PLATELET # BLD AUTO: 230 10E9/L (ref 150–450)
POTASSIUM SERPL-SCNC: 3.8 MMOL/L (ref 3.4–5.3)
RBC # BLD AUTO: 3.23 10E12/L (ref 3.8–5.2)
SODIUM SERPL-SCNC: 135 MMOL/L (ref 133–144)
WBC # BLD AUTO: 13.3 10E9/L (ref 4–11)

## 2019-06-25 PROCEDURE — 99207 ZZC MOONLIGHTING INDICATOR: CPT | Performed by: INTERNAL MEDICINE

## 2019-06-25 PROCEDURE — 25000132 ZZH RX MED GY IP 250 OP 250 PS 637: Performed by: INTERNAL MEDICINE

## 2019-06-25 PROCEDURE — 80048 BASIC METABOLIC PNL TOTAL CA: CPT | Performed by: INTERNAL MEDICINE

## 2019-06-25 PROCEDURE — 96375 TX/PRO/DX INJ NEW DRUG ADDON: CPT

## 2019-06-25 PROCEDURE — 85025 COMPLETE CBC W/AUTO DIFF WBC: CPT | Performed by: INTERNAL MEDICINE

## 2019-06-25 PROCEDURE — 83735 ASSAY OF MAGNESIUM: CPT | Performed by: INTERNAL MEDICINE

## 2019-06-25 PROCEDURE — G0378 HOSPITAL OBSERVATION PER HR: HCPCS

## 2019-06-25 PROCEDURE — 74019 RADEX ABDOMEN 2 VIEWS: CPT | Mod: TC

## 2019-06-25 PROCEDURE — 97535 SELF CARE MNGMENT TRAINING: CPT | Mod: GO

## 2019-06-25 PROCEDURE — 97530 THERAPEUTIC ACTIVITIES: CPT | Mod: GP

## 2019-06-25 PROCEDURE — 97166 OT EVAL MOD COMPLEX 45 MIN: CPT | Mod: GO

## 2019-06-25 PROCEDURE — 25000128 H RX IP 250 OP 636: Performed by: INTERNAL MEDICINE

## 2019-06-25 PROCEDURE — 97161 PT EVAL LOW COMPLEX 20 MIN: CPT | Mod: GP

## 2019-06-25 PROCEDURE — 25800030 ZZH RX IP 258 OP 636: Performed by: INTERNAL MEDICINE

## 2019-06-25 PROCEDURE — 99226 ZZC SUBSEQUENT OBSERVATION CARE,LEVEL III: CPT | Performed by: INTERNAL MEDICINE

## 2019-06-25 PROCEDURE — 87081 CULTURE SCREEN ONLY: CPT | Performed by: INTERNAL MEDICINE

## 2019-06-25 RX ORDER — METOCLOPRAMIDE HYDROCHLORIDE 5 MG/ML
5 INJECTION INTRAMUSCULAR; INTRAVENOUS EVERY 6 HOURS PRN
Status: DISCONTINUED | OUTPATIENT
Start: 2019-06-25 | End: 2019-06-26 | Stop reason: HOSPADM

## 2019-06-25 RX ORDER — METOPROLOL TARTRATE 100 MG
200 TABLET ORAL 2 TIMES DAILY
Status: DISCONTINUED | OUTPATIENT
Start: 2019-06-25 | End: 2019-06-26 | Stop reason: HOSPADM

## 2019-06-25 RX ORDER — HYDRALAZINE HYDROCHLORIDE 25 MG/1
25 TABLET, FILM COATED ORAL 2 TIMES DAILY
COMMUNITY

## 2019-06-25 RX ORDER — SODIUM CHLORIDE 9 MG/ML
INJECTION, SOLUTION INTRAVENOUS CONTINUOUS
Status: ACTIVE | OUTPATIENT
Start: 2019-06-25 | End: 2019-06-26

## 2019-06-25 RX ADMIN — OMEPRAZOLE 20 MG: 20 CAPSULE, DELAYED RELEASE ORAL at 08:43

## 2019-06-25 RX ADMIN — METOCLOPRAMIDE 5 MG: 5 INJECTION, SOLUTION INTRAMUSCULAR; INTRAVENOUS at 02:51

## 2019-06-25 RX ADMIN — MULTIPLE VITAMINS W/ MINERALS TAB 1 TABLET: TAB at 09:58

## 2019-06-25 RX ADMIN — UMECLIDINIUM 1 PUFF: 62.5 AEROSOL, POWDER ORAL at 09:58

## 2019-06-25 RX ADMIN — METOPROLOL TARTRATE 100 MG: 100 TABLET, FILM COATED ORAL at 09:57

## 2019-06-25 RX ADMIN — RIVAROXABAN 15 MG: 15 TABLET, FILM COATED ORAL at 17:26

## 2019-06-25 RX ADMIN — FLUTICASONE FUROATE AND VILANTEROL TRIFENATATE 1 PUFF: 200; 25 POWDER RESPIRATORY (INHALATION) at 09:58

## 2019-06-25 RX ADMIN — FLUTICASONE PROPIONATE 1 SPRAY: 50 SPRAY, METERED NASAL at 09:58

## 2019-06-25 RX ADMIN — MAGNESIUM OXIDE TAB 400 MG (241.3 MG ELEMENTAL MG) 400 MG: 400 (241.3 MG) TAB at 09:57

## 2019-06-25 RX ADMIN — POTASSIUM CHLORIDE 20 MEQ: 1500 TABLET, EXTENDED RELEASE ORAL at 00:03

## 2019-06-25 RX ADMIN — SODIUM CHLORIDE: 9 INJECTION, SOLUTION INTRAVENOUS at 11:21

## 2019-06-25 RX ADMIN — OXYCODONE HYDROCHLORIDE AND ACETAMINOPHEN 1 TABLET: 5; 325 TABLET ORAL at 06:17

## 2019-06-25 RX ADMIN — POTASSIUM CHLORIDE 20 MEQ: 1500 TABLET, EXTENDED RELEASE ORAL at 21:13

## 2019-06-25 RX ADMIN — LISINOPRIL 20 MG: 10 TABLET ORAL at 21:14

## 2019-06-25 RX ADMIN — POTASSIUM CHLORIDE 20 MEQ: 1500 TABLET, EXTENDED RELEASE ORAL at 09:57

## 2019-06-25 RX ADMIN — ACETAMINOPHEN 800 MCG: 400 TABLET ORAL at 09:57

## 2019-06-25 RX ADMIN — LISINOPRIL 20 MG: 10 TABLET ORAL at 09:58

## 2019-06-25 RX ADMIN — METOPROLOL TARTRATE 100 MG: 100 TABLET, FILM COATED ORAL at 00:03

## 2019-06-25 RX ADMIN — DILTIAZEM HYDROCHLORIDE 240 MG: 120 CAPSULE, COATED, EXTENDED RELEASE ORAL at 09:58

## 2019-06-25 RX ADMIN — OMEPRAZOLE 20 MG: 20 CAPSULE, DELAYED RELEASE ORAL at 17:26

## 2019-06-25 RX ADMIN — METOPROLOL TARTRATE 200 MG: 100 TABLET, FILM COATED ORAL at 21:15

## 2019-06-25 RX ADMIN — MAGNESIUM OXIDE TAB 400 MG (241.3 MG ELEMENTAL MG) 400 MG: 400 (241.3 MG) TAB at 21:15

## 2019-06-25 RX ADMIN — LISINOPRIL 20 MG: 10 TABLET ORAL at 00:03

## 2019-06-25 RX ADMIN — RIVAROXABAN 15 MG: 15 TABLET, FILM COATED ORAL at 00:18

## 2019-06-25 RX ADMIN — MAGNESIUM OXIDE TAB 400 MG (241.3 MG ELEMENTAL MG) 400 MG: 400 (241.3 MG) TAB at 00:03

## 2019-06-25 ASSESSMENT — ACTIVITIES OF DAILY LIVING (ADL)
PREVIOUS_RESPONSIBILITIES: MEAL PREP
ADLS_ACUITY_SCORE: 14
ADLS_ACUITY_SCORE: 17
ADLS_ACUITY_SCORE: 14

## 2019-06-25 NOTE — PLAN OF CARE
"Discharge Planner OT   Patient plan for discharge: \"I can't go home right now but maybe in a few days\"  Current status: Pt is an 83 year old female being seen for OT evaluation after being admitted via ED with generalized weakness. Pt and daughter report pt has grown increasingly weak over the last few weeks and has been unable to care for herself at her baseline. Pt also had a fall at home on 6/22/19. Pt lives with her daughter and son in law in a house with 3 steps to enter and 16 steps to upper level where shower is located. Pt is able to stay on main level other than shower with her bed in the living room and a half bath/toilet estimated 30 feet away. Pt was previously returning to her home for showers with transportation and assist provided by her daughter. Pt has a 2WW, hospital bed, raised toilet, and toilet safety frame. Pt previously independent with all ADL. Daughter assisted with IADL. Pt's daughter is available 24/7 to assist as needed. Pt currently requires mod assist for toileting with bedside commode, LB dressing, bed mobility, and bathing; min assist for UB dressing. Pt fatigues quickly with activity and requires extended time to complete tasks. Pt reports nausea with mobility and did have small emesis x1 during session.    Barriers to return to prior living situation: Level of assist; decreased strength and activity tolerance  Recommendations for discharge: TCU, pending improvements with functional mobility and independence with ADL.  Rationale for recommendations: Based on pt's current level of function pt would benefit from skilled OT services within the TCU setting to progress toward prior level of function and increase strength and activity tolerance for performance on ADL. Pt's daughter reports she does not feel comfortable with providing level of care currently required by pt.       Entered by: Corine Jon 06/25/2019 9:54 AM       "

## 2019-06-25 NOTE — PLAN OF CARE
"Discharge Planner PT   Patient plan for discharge: \"I'm not safe to go home\"  Current status: Patient is a 83 year old year old female admitted 6/24 with generalized weakness. Prior to admission, patient was living at daughter's house with 3 steps to enter, 1 flight of stairs to shower, reports no use of walker or SPC, and MOD IND with ADLs.   Patient demonstrates MOD IND from supine to sit, and sit<>stand CGA, able to perform static standing x 30\" before requesting seated rest break. Patient then performs second sit<>stand with CGA, demonstrates decreased dynamic balance with standing marches & B UE support at 2WW. Patient ambulates to bed side commode and back ( 5 ft total) with 2WW & CGA. Patient reports she is fatigued, however is able to stand without UE support to perform pericares x 30\", CGA provided for safety. Requires mod A at B LE for sit to supine then max A x 2 for positioning toward HOB at end of evaluation d/t fatigue.   Barriers to return to prior living situation: 3 steps to enter home, limited tolerance to ambulation, vision deficits, fear of falls  Recommendations for discharge: TCU pending mobility improvements, otherwise  PT if chooses to discharge home  Rationale for recommendations: Patient with decreased functional strength with bed mobility, transfers, and ambulation. Patient is at risk of future falls. Patient would benefit from continued skilled therapeutic intervention in order to progress towards her desired level of function.         Entered by: Chana Skaggs 06/25/2019 2:50 PM       "

## 2019-06-25 NOTE — CONSULTS
CARE TRANSITION SOCIAL WORK INITIAL ASSESSMENT:      Met with: Patient and Family.    DATA  Active Problems:    COPD (chronic obstructive pulmonary disease) (H)    CKD (chronic kidney disease) stage 3, GFR 30-59 ml/min (H)    HTN (hypertension)    Hyponatremia    Hypochloremia    Generalized muscle weakness    Weakness    Personal history of Lung cancer - right lobectomy 2006, chemo 2007       ASSESSMENT  Cognitive Status: awake, alert and oriented.           Description of Support System: Involved, Supportive   Who is your support system?: Children   Support Assessment: Adequate family and caregiver support   Insurance Concerns: No Insurance issues identified  Living Arrangements: house    This writer met with pt and her daughter, Teresita, introduced self and role. Discussed discharge planning and medicare guidelines in regards to home care and SNF benefits.  Patient lives with her daughter, Teresita, in a home.  She uses a walker with ambulation.  Patient had a fall on Sunday.  Patient does not currently receive any in-home services.  Patient had a P/T evaluation today.  Recommendation from therapy is TCU.  Discussed with patient/family options for discharge.  Patient is now OBS and will be private pay for TCU.  Discussed with patient/family.  They are in agreement to send referral to [facility] (Main Phone: [phone] Admissions Phone: [phone] Fax: [phone]).  If patient improves with mobility, patient/family would be interested in home care.  Referral sent to [facility].  Care Transitions to follow.    PLAN    TCU vs Home Care    MILDRED Holt  [facility] [phone]/ [facility] [phone]

## 2019-06-25 NOTE — PROGRESS NOTES
S-(situation): Patient arrives to room 253 via cart from ED    B-(background): weakness, confusion    A-(assessment): Temp: 97.9  F (36.6  C) Temp src: Oral BP: 149/65 Pulse: 70   Resp: 18 SpO2: 97 % O2 Device: Nasal cannula Oxygen Delivery: 3 LPM Pt is alert and oriented x4.  Tolerating transfer with assist of 1-2 staff, fatigues easily.  Denies pain.    R-(recommendations): Orders reviewed with patient. Will monitor patient per MD orders.     Inpatient nursing criteria listed below were met:    Health care directives status obtained and documented: Yes  SCD's Documented: No  Skin issues/needs documented: bruised, intact  Isolation needs addressed, if appropriate: NA  Fall Prevention: Care plan updated, Education given and documented Yes  MRSA swab completed for patient 55 years and older (exclude [place]ANNAH and TKA): needs to be completed  Care Plan initiated: Yes  Education Assessment documented:Yes  Education Documented (Pre-existing chronic infection such as, MRSA/VRE need education on admission): Yes  Admission Medication Reconciliation completed: Yes  New medication patient education completed and documented (Possible Side Effects of Common Medications handout): Yes  Home medications if not able to send immediately home with family stored here: Yes  Reminder note placed in discharge instructions: Yes  Discharge planning review completed (admission navigator) Yes

## 2019-06-25 NOTE — ED NOTES
ED Nursing criteria listed below was addressed during verbal handoff:     Abnormal vitals: No  Abnormal results: Yes  Med Reconciliation completed: Yes  Meds given in ED: Yes  Any Overdue Meds: No  Core Measures: No  Isolation: No  Special needs: No  Skin assessment: Yes    Observation Patient  Education provided: N/A

## 2019-06-25 NOTE — ED NOTES
Patient up to use bedside commode with assistance of RN.  Repositioned in bed and provided with chicken broth. Patient declined bag lunch. Hospitalist in to see patient.

## 2019-06-25 NOTE — PROGRESS NOTES
06/25/19 0915   Quick Adds   Type of Visit Initial Occupational Therapy Evaluation   Living Environment   Lives With child(cherie), adult   Living Arrangements house   Home Accessibility stairs to enter home;stairs within home   Number of Stairs, Main Entrance 3   Number of Stairs, Within Home, Primary   (16 to upstairs bathroom)   Transportation Anticipated family or friend will provide   Living Environment Comment Pt lives with her daughter and son in law in a house with 3 steps to enter and 16 steps to upper level where shower is located. Pt is able to stay on main level other than shower with her bed in the living room and a half bath/toilet estimated 30 feet away. Pt was previously returning to her home for showers with transportation and assist provided by her daughter. Pt has a 2WW, hospital bed, raised toilet, and toilet safety frame. Pt previously independent with all ADL. Daughter assisted with IADL. Pt's daughter is available 24/7 to assist as needed.   Self-Care   Usual Activity Tolerance moderate   Current Activity Tolerance fair   Regular Exercise No   Equipment Currently Used at Home raised toilet;walker, standard  (toilet safety frame; hospital bed)   Functional Level   Ambulation 1-->assistive equipment   Transferring 1-->assistive equipment   Toileting 1-->assistive equipment   Bathing 2-->assistive person   Dressing 0-->independent   Eating 0-->independent   Communication 0-->understands/communicates without difficulty   Swallowing 0-->swallows foods/liquids without difficulty   Cognition 0 - no cognition issues reported   Fall history within last six months yes   Number of times patient has fallen within last six months 1   Which of the above functional risks had a recent onset or change? fall history;ambulation;transferring  (medical needs)   General Information   Onset of Illness/Injury or Date of Surgery - Date 06/24/19   Referring Physician Jazmin Corey MD   Patient/Family Goals  "Statement \"Get stronger\"   Additional Occupational Profile Info/Pertinent History of Current Problem Pt is an 83 year old female being seen for OT evaluation after being admitted via ED with generalized weakness. Pt and daughter report pt has grown increasingly weak over the last few weeks and has been unable to care for herself at her baseline. Pt also had a fall at home on 6/22/19.    Precautions/Limitations fall precautions   Pain Assessment   Patient Currently in Pain Yes, see Vital Sign flowsheet  (Bilateral LE)   Bathing   Level of [place] moderate assist (50% patients effort)   Physical Assist/Nonphysical Assist 1 person assist   Assistive Device shower chair;grab bars   Upper Body Dressing   Level of [place]: Dress Upper Body minimum assist (75% patients effort)   Physical Assist/Nonphysical Assist: Dress Upper Body 1 person assist   Lower Body Dressing   Level of [place]: Dress Lower Body moderate assist (50% patients effort)   Physical Assist/Nonphysical Assist: Dress Lower Body 1 person assist   Toileting   Level of [place]: Toilet moderate assist (50% patients effort)   Physical Assist/Nonphysical Assist: Toilet 1 person assist   Assistive Device grab bars   Grooming   Level of [place]: Grooming minimum assist (75% patients effort)   Physical Assist/Nonphysical Assist: Grooming 1 person assist   Eating/Self Feeding   Level of [place]: Eating independent   Instrumental Activities of Daily Living (IADL)   Previous Responsibilities meal prep   IADL Comments Pt's daughter provided most IADL tasks. Pt did complete some simple meal prep   Activities of Daily Living Analysis   Impairments Contributing to Impaired Activities of Daily Living ROM decreased;strength decreased   General Therapy Interventions   Planned Therapy Interventions ADL retraining;IADL retraining;strengthening   Clinical Impression   Criteria for Skilled Therapeutic Interventions Met yes, treatment indicated   OT " "Diagnosis Decreased independence with ADL from baseline   Influenced by the following impairments decreased strength and activity tolerance; medical needs   Assessment of Occupational Performance 5 or more Performance Deficits   Identified Performance Deficits dressing, bathing, toileting, grooming, bed mobility, meal prep   Clinical Decision Making (Complexity) Moderate complexity   Therapy Frequency Daily   Predicted Duration of Therapy Intervention (days/wks) 1-3 days   Anticipated Equipment Needs at Discharge bedside commode   Anticipated Discharge Disposition Transitional Care Facility;Home with Home Therapy   Risks and Benefits of Treatment have been explained. Yes   Patient, Family & other staff in agreement with plan of care Yes   Clinical Impression Comments Pt would benefit from skilled OT services to progress independence and safety with ADL to return to prior level of function   [facility] TM \"6 Clicks\"   2016, Trustees of [facility], under license to [employer].  All rights reserved.   6 Clicks Short Forms Daily Activity Inpatient Short Form   [facility] AM-PAC  \"6 Clicks\" Daily Activity Inpatient Short Form   1. Putting on and taking off regular lower body clothing? 3 - A Little   2. Bathing (including washing, rinsing, drying)? 2 - A Lot   3. Toileting, which includes using toilet, bedpan or urinal? 2 - A Lot   4. Putting on and taking off regular upper body clothing? 3 - A Little   5. Taking care of personal grooming such as brushing teeth? 3 - A Little   6. Eating meals? 4 - None   Daily Activity Raw Score (Score out of 24.Lower scores equate to lower levels of function) 17   Total Evaluation Time   Total Evaluation Time (Minutes) 16     FELICITY Weems/L  [facility]  [phone]  "

## 2019-06-25 NOTE — H&P
[facility]    History and Physical - Hospitalist Service       Date of Admission:  6/24/2019    Assessment & Plan   Janice K Goodell is a 83 year old female admitted on 6/24/2019.    Generalized weakness, likely due to electrolyte abnormalities:  Hypochloremic alkalosis, hyponatremia, hypokalemia likely due to GI loss.  Status post magnesium citrate high-dose for constipation, given on Saturday.  No ongoing diarrhea.  Nonfocal exam.  Hemoglobin 10.6, up from 7.8 and 5/7/2019.  1 L of normal saline given in ER-patient already started feeling better.  Continue NS +K for IV fluids.  Holding home dose of Lasix.  Supplemental magnesium for magnesium of 1.6.  Check electrolytes with a.m. Labs  PT, OT evaluate for disposition needs    Paroxysmal atrial fibrillation.  Sinus rhythm on telemetry.  Normal sinus rhythm on EKG from 5/7/2019.  Continue home dose of Cardizem, metoprolol and Xarelto.    COPD with chronic hypoxic respiratory failure, on 3 L/min of oxygen for 3 years, chronic and stable.  Continue home medications.  As needed albuterol mdi.    History of lung cancer.  Status post right upper lobectomy.  Currently on immunotherapy with Opdivo.  Follows with [place] [facility].  As needed Percocet for pain.  Recurrent pleural effusion, the left, residual pleural effusion seen on today's chest x-ray.  Status post thoracentesis    Essential hypertension.  Continue home dose of  lisinopril, Cardizem, with holding parameters.  Takes 200 mg twice a day of metoprolol, will give 100 mg twice a day, increase dose as needed.    Fall at home, on 6/22/2019.  It appears to be mechanical fall, when patient tripped over her shoes.  She hit her head.  No loss of consciousness.  CT head negative for intracranial hemorrhage.  Patient on Xarelto.    The patient- 2 tabs of Senokot at bedtime as needed.    Full code  DVT prophylaxis-on Xarelto, ambulate  Disposition-anticipate hospitalization for more than  2 midnights     Diet: Regular  Kerns Catheter: not present    Disposition Plan   Expected discharge: 2 - 3 days, recommended to prior living arrangement once  electrolyte abnormalities resolved and safe disposition identified.  Entered: Jazmin Corey MD 06/24/2019, 10:19 PM     The patient's care was discussed with the Patient and Primary team.    Jazmin Corey MD  [facility]    ______________________________________________________________________    Chief Complaint   Generalized weakness, confusion    History is obtained from the patient    History of Present Illness   Janice K Goodell is a 83 year old female who for mitral paroxysmal atrial fibrillation, HTN, lung cancer, status post right upper lobe ectomy, currently on immunotherapy with Opdivo, anticoagulated with Xarelto, presented to ED for evaluation of above-mentioned symptoms.  Patient describes a feeling weak and weaker for the days, to the point that lifting of arm or leg is an effort for care.  No associated chest pain, cardiac palpitations, dyspnea, lightheadedness.  She had some nausea and constipation last week, for which she took a full bottle of magnesium citrate, as was instructed by her GI.  She stopped stooling afterwards.  Currently no diarrhea.  She fell Saturday night, will trip over [facility].  Patient educated on Xarelto, and hit her ahead.  No loss of consciousness.  Patient has history of COPD, oxygen dependent 3 L continuously, without worsening dyspnea or chronic cough lately.  Patient denies fevers, chills, melena, hematochezia, productive cough, dysuria.  She has urinary frequency, but takes Lasix.    Review of Systems    The 5 point Review of Systems is negative other than noted in the HPI.    Past Medical History    I have reviewed this patient's medical history and updated it with pertinent information if needed.   Past Medical History:   Diagnosis Date     COPD (chronic obstructive pulmonary  disease) (H)      Heart valve disorder      Hypertension      Macular degeneration      Malignant neoplasm (H)     Lung     Plantar fasciitis        Past Surgical History   I have reviewed this patient's surgical history and updated it with pertinent information if needed.  Past Surgical History:   Procedure Laterality Date     APPENDECTOMY       BIOPSY  2006    Lung     CATARACT IOL, RT/LT  2008    Right eye     CHOLECYSTECTOMY       COLONOSCOPY       Colorectal surgery       ENT SURGERY      Tympanoplasty     HC REMV CATARACT EXTRACAP,INSERT LENS  09    Left eye     HERNIA REPAIR      Incarcerated, 2 surgeries     LASER YAG CAPSULOTOMY  2014    Procedure: LASER YAG CAPSULOTOMY;  Surgeon: Wes Mcbride MD;  Location: [facility] OR     Right lung - lobectomy         Social History   I have reviewed this patient's social history and updated it with pertinent information if needed.  Social History     Tobacco Use     Smoking status: Former Smoker     Last attempt to quit: 10/12/2001     Years since quittin.7   Substance Use Topics     Alcohol use: No     Drug use: No       Family History   I have reviewed this patient's family history is not relevant to chief complaint.    Prior to Admission Medications   Prior to Admission Medications   Prescriptions Last Dose Informant Patient Reported? Taking?   BACTROBAN 2 % EX OINT Unknown at Unknown time  Yes No   Sig: in nose prn   MULTIPLE VITAMIN ESSENTIAL TABS 2019 at 0830  Yes Yes   Sig: Take 1 tablet by mouth daily   Multiple Vitamins-Minerals (OCUVITE PRESERVISION PO) 2019 at 0830  Yes Yes   Sig: Take 1 tablet by mouth 2 times daily   albuterol (PROAIR HFA/PROVENTIL HFA/VENTOLIN HFA) 108 (90 Base) MCG/ACT inhaler Past Month at Unknown time  Yes Yes   Sig: Inhale 2 puffs into the lungs 4 times daily as needed   cetirizine (ZYRTEC) 10 MG tablet Past Month at Unknown time  No Yes   Sig: Take 1 tablet by mouth 2 times daily as needed  (itch, swelling of lips).   desonide (DESOWEN) 0.05 % cream Unknown at Unknown time  Yes No   Sig: Apply topically as needed   diltiazem ER (TIAZAC) 240 MG 24 hr ER beaded capsule   No No   Sig: Take 1 capsule (240 mg) by mouth daily for 5 doses   Patient taking differently: Take 240 mg by mouth daily    docusate sodium (STOOL SOFTENER) 100 MG capsule 2019 at 0830  Yes Yes   Sig: Take 100 mg by mouth daily   fluticasone (FLONASE) 50 MCG/ACT spray 2019 at 2030  Yes Yes   Sig: Spray 1 spray in nostril daily   fluticasone-salmeterol (ADVAIR DISKUS) 500-50 MCG/DOSE diskus inhaler 2019 at 0830  Yes Yes   Sig: Inhale 1 puff into the lungs 2 times daily   folic acid 800 MCG tablet 2019 at 2030 Self Yes Yes   Sig: Take 800 mcg by mouth daily   furosemide (LASIX) 40 MG tablet 2019 at 0830  Yes Yes   Sig: Take 40 mg by mouth daily   lisinopril (PRINIVIL/ZESTRIL) 10 MG tablet 2019 at 0830  Yes Yes   Sig: Take 10 mg by mouth 2 times daily   metoprolol tartrate (LOPRESSOR) 50 MG tablet 2019 at 0830  No Yes   Sig: Take 1 tablet (50 mg) by mouth 2 times daily   Patient taking differently: Take 100 mg by mouth 2 times daily    omeprazole (PRILOSEC) 20 MG CR capsule 2019 at 0830  Yes Yes   Sig: Take 20 mg by mouth 2 times daily   oxyCODONE-acetaminophen (PERCOCET) 5-325 MG tablet Past Week at Unknown time  No Yes   Sig: Take 1 tablet by mouth every 12 hours as needed for pain   potassium chloride SA (K-DUR/KLOR-CON M) 10 MEQ CR tablet 2019 at 0830  Yes Yes   Sig: Take 10 mEq by mouth 2 times daily (with meals)   tiotropium (SPIRIVA) 18 MCG capsule 2019 at 0830  Yes Yes   Sig: Inhale 18 mcg into the lungs daily   triamcinolone (KENALOG) 0.1 % paste Unknown at Unknown time  Yes No   Si times daily as needed      Facility-Administered Medications: None     Allergies   Allergies   Allergen Reactions     Sulfa Drugs      Tongue felt fat- no rash, no SOB  Other reaction(s): Edema      Adhesive Tape Rash     reacted to the adhesive on a Catarpes patch-not allergic to the Catapres-also reacted to the adhesive on leads from her Holter Monitor-not allergic to Band-Aids     Morphine Sulfate      Amoxicillin      Tongue felt fat- no rash, no SOB     Ceftriaxone Other (See Comments)     Dysphagia, but tolerates with benadryl, however has a long history of intermittent dysphagia. Unclear it is from the antibiotic     Doxycycline Swelling     Levaquin      Tongue felt fat- no rash, no SOB,     Re-trial of Levaquin made 9/2012 with angioedema reaction to lips.     Zithromax [Azithromycin Dihydrate]      Tongue felt fat- no rash, no SOB     Thiazide-Type Diuretics Rash       Physical Exam   Vital Signs: Temp: 98.3  F (36.8  C) Temp src: Oral BP: 168/70 Pulse: 79   Resp: 17 SpO2: 99 % O2 Device: Nasal cannula Oxygen Delivery: 3 LPM  Weight: 126 lbs 0 oz    Constitutional: She is oriented to person, place, and time. She appears well-developed. No distress.  Speech is clear and fluent.  HENT:   Head: Normocephalic.   Small bruise above Right eye   Eyes: Pupils are equal, round, and reactive to light. EOM are normal.   Neck: Normal range of motion.   Cardiovascular: Normal rate, regular rhythm and normal heart sounds.   Pulmonary/Chest: Effort normal and breath sounds normal.   Mediport in place, accessed, site intact.  Abdominal: Soft. Bowel sounds are normal. There is tenderness.   Mild tenderness along colon tract   Musculoskeletal:   Generalized weakness, equal strengths   Neurological: She is alert and oriented to person, place, and time.   Skin: Skin is warm and dry. Capillary refill takes less than 2 seconds.      Data   Data reviewed today: I reviewed all medications, new labs and imaging results over the last 24 hours. I personally reviewed the CT head negative for traumatic injuries.  Telemetry-normal sinus rhythm. image(s) showing p.    Recent Results (from the past 24 hour(s))   XR Chest 2 Views     Narrative    CHEST TWO VIEWS 6/24/2019 6:04 PM     HISTORY: Cough, chronic obstructive pulmonary disease history as well  as lung cancer history.    COMPARISON: May 7, 2019     FINDINGS: No significant residual fluid on the left compatible with  improved effusion. No pneumothorax. Hyperexpansion changes. Minimal  scattered interstitial changes appear similar. No new infiltrates. The  cardiac silhouette is not enlarged. Pulmonary vasculature is  unremarkable.      Impression    IMPRESSION: Improved left effusion since the comparison study.    ALYSSA NAILS MD   CT Head w/o Contrast    Narrative    CT SCAN OF THE HEAD WITHOUT CONTRAST   6/24/2019 6:12 PM     HISTORY: Fall, hit head, on Xarelto.    TECHNIQUE:  Axial images of the head and coronal reformations without  IV contrast material.  Radiation dose for this scan was reduced using  automated exposure control, adjustment of the mA and/or kV according  to patient size, or iterative reconstruction technique.    COMPARISON: None.    FINDINGS: Mild to moderate cerebral atrophy is present. Moderately  extensive white matter changes are seen in both hemispheres without  mass effect. There is no evidence for intracranial hemorrhage, mass  effect, acute infarct, or skull fracture. Vascular calcifications  noted. Visualized paranasal sinuses and mastoid air cells are clear.      Impression    IMPRESSION: Chronic changes. No evidence for intracranial hemorrhage  or acute infarct.    NARCISO CANCHOLA MD

## 2019-06-25 NOTE — ED NOTES
Patient up to use bedside commode with assistance of RN and daughter.  Tolerated okay.  Patient repositioned in bed and updated that provider will be in when all results are back.

## 2019-06-25 NOTE — PLAN OF CARE
"VSS.  Patient denies pain but reports feeling nauseous.  Requested Reglan as patient stated that seems to work best in past.  IV infusing from port at 100/hr.  Up with 1-2 assist to bedside commode with walker and gait belt.  Patient reports feeling \"weak\".  Patient has macular degeneration and has a hard time seeing.  Patient uses magnifying glass.  Also reassured patient to put on call light for any visual needs she may have to best assist her. Will continue to monitor and assess per plan of care.   "

## 2019-06-25 NOTE — PROGRESS NOTES
[facility]    Medicine Progress Note - Hospitalist Service       Date of Admission:  6/24/2019  Assessment & Plan      Janice K Goodell is a 83 year old female admitted on 6/24/2019 because of generalized weakness and electrolyte abnormalities.    Generalized weakness, likely due to electrolyte abnormalities:  Hypochloremic alkalosis, hyponatremia, hypokalemia likely due to GI loss.  Status post magnesium citrate high-dose for constipation, given on Saturday.  No ongoing diarrhea.  Nonfocal exam.   Hemoglobin 10.6, up from 7.8 and 5/7/2019.   1 L of normal saline given in ER-  Continue NS +K for IV fluids.  Decrease the rate to 50 mL/h  Holding home dose of Lasix.  Magnesium replacement protocol.  Check electrolytes with a.m. Labs  PT, OT evaluate for disposition needs    Abdominal pain and bloating  The pain is mainly epigastric.  The patient also reports some difficulty with swallowing pills.  This is a new symptom.  Symptoms could be secondary to dyspepsia/gastritis/reflux esophagitis  Continue with present 4 mm.  X-ray of the abdomen with nonspecific gas pattern.  Patient had a recent CT scan of the chest, abdomen and pelvis in outside facility.  I will not repeat that.  Consider upper GI endoscopy if symptoms continue.    Paroxysmal atrial fibrillation.  Sinus rhythm on telemetry.  Normal sinus rhythm on EKG from 5/7/2019.  Continue home dose of Cardizem, metoprolol and Xarelto.   Patient takes metoprolol 200 mg twice daily at home.  I will adjust the dose to home regimen.    COPD with chronic hypoxic respiratory failure, on 3 L/min of oxygen for 3 years, chronic and stable.  Continue home medications.  As needed albuterol mdi.    History of lung cancer.  Status post right upper lobectomy.  Currently on immunotherapy with Opdivo.  Follows with [place] [facility].  As needed Percocet for pain.  Recurrent pleural effusion, the left, residual pleural effusion seen on current chest  x-ray.  Status post thoracentesis    Essential hypertension.  Continue home dose of  lisinopril, Cardizem, with holding parameters.    Fall at home, on 6/22/2019.  It appears to be mechanical fall, when patient tripped over her shoes.  She hit her head.  No loss of consciousness.  CT head negative for intracranial hemorrhage.  Patient on Xarelto. Continue to monitor       Full code  DVT prophylaxis-on Xarelto, ambulate    Diet: Snacks/Supplements Adult: Boost Plus; Between Meals  Snacks/Supplements Adult: Magic Cup; Between Meals  Combination Diet Regular Diet Adult    DVT Prophylaxis: Patient is on Xarelto  Kerns Catheter: not present  Code Status: Full Code      Disposition Plan   Expected discharge: In 1 to 2 days, recommended to prior living arrangement once adequate pain management/ tolerating PO medications.  Entered: Emelia Morocho MD 06/25/2019, 1:37 PM       The patient's care was discussed with the Bedside Nurse, Patient and Patient's Family.    Emelia Morocho MD  Hospitalist Service  [facility]    ______________________________________________________________________    Interval History   Today the patient is complaining of pain in the epigastrium.  She is also complaining of difficulty swallowing pills which she noticed today.  She is also complaining of bloating of the upper abdomen.  She denies any nausea.  No chest pain or shortness of breath.    Data reviewed today: I reviewed all medications, new labs and imaging results over the last 24 hours.     Physical Exam   Vital Signs: Temp: 98  F (36.7  C) Temp src: Oral BP: 169/62 Pulse: 76   Resp: 18 SpO2: 96 % O2 Device: Nasal cannula Oxygen Delivery: 2 LPM  Weight: 119 lbs .77 oz  GENERAL: Alert and awake, no apparent distress    HEENT: Patient is normocephalic, atraumatic.   EYES: No conjunctival injection, no icterus  NECK: Neck is supple, symmetrical, No JVD  RESPIRATORY: Lungs clear to auscultation bilaterally.  No wheezes, rales or rhonchi. Respirations unlabored.  CARDIAC:S1, S2 audible. Regular rhythm, normal rate  GI: Soft, abdomen is mildly distended without palpable organomegaly.  There is epigastric tenderness without rebound or guarding. Bowel sounds are normoactive.  VASCULAR: Peripheral pulses palpable and normal  Musculoskeletal: Extremities: Warm & well perfused. No edema or cyanosis  NEUROLOGIC: No facial droop.  Patient is alert and awake and oriented.  No gross focal neurological deficits.   PSYCHIATRIC: Normal affect, normal speech, no loose associations.    SKIN: Exposed skin with normal turgor and color, no rashes      Data   Recent Labs   Lab 06/25/19  0520 06/24/19  1842   WBC 13.3* 11.4*   HGB 9.8* 10.6*   MCV 95 94    271    129*   POTASSIUM 3.8 3.4   CHLORIDE 88* 79*   CO2 44* 45*   BUN 14 13   CR 0.87 0.88   ANIONGAP 3 5   COLIN 9.8 10.0   * 143*   ALBUMIN  --  3.2*   PROTTOTAL  --  6.8   BILITOTAL  --  0.3   ALKPHOS  --  101   ALT  --  33   AST  --  23     Recent Results (from the past 24 hour(s))   XR Chest 2 Views    Narrative    CHEST TWO VIEWS 6/24/2019 6:04 PM     HISTORY: Cough, chronic obstructive pulmonary disease history as well  as lung cancer history.    COMPARISON: May 7, 2019     FINDINGS: No significant residual fluid on the left compatible with  improved effusion. No pneumothorax. Hyperexpansion changes. Minimal  scattered interstitial changes appear similar. No new infiltrates. The  cardiac silhouette is not enlarged. Pulmonary vasculature is  unremarkable.      Impression    IMPRESSION: Improved left effusion since the comparison study.    ALYSSA NAILS MD   CT Head w/o Contrast    Narrative    CT SCAN OF THE HEAD WITHOUT CONTRAST   6/24/2019 6:12 PM     HISTORY: Fall, hit head, on Xarelto.    TECHNIQUE:  Axial images of the head and coronal reformations without  IV contrast material.  Radiation dose for this scan was reduced using  automated exposure control,  adjustment of the mA and/or kV according  to patient size, or iterative reconstruction technique.    COMPARISON: None.    FINDINGS: Mild to moderate cerebral atrophy is present. Moderately  extensive white matter changes are seen in both hemispheres without  mass effect. There is no evidence for intracranial hemorrhage, mass  effect, acute infarct, or skull fracture. Vascular calcifications  noted. Visualized paranasal sinuses and mastoid air cells are clear.      Impression    IMPRESSION: Chronic changes. No evidence for intracranial hemorrhage  or acute infarct.    NARCISO CANCHOLA MD   XR Abdomen 2 Views    Narrative    ABDOMEN TWO VIEWS  6/25/2019 12:46 PM     HISTORY:  Abdominal pain. Question obstruction.    COMPARISON: 5/30/2019      Impression    IMPRESSION: There is a nonspecific bowel gas pattern with a few  air-fluid levels in nondilated bowel. No convincing evidence of small  bowel obstruction. No free intraperitoneal air.    ANNA OSWALD MD     Medications     - MEDICATION INSTRUCTIONS -       sodium chloride 50 mL/hr at 06/25/19 1121       diltiazem ER COATED BEADS  240 mg Oral Daily     fluticasone  1 spray Nasal Daily     fluticasone-vilanterol  1 puff Inhalation Daily     folic acid  800 mcg Oral Daily     heparin  5 mL Intracatheter Q28 Days     heparin lock flush  5-10 mL Intracatheter Q24H     lisinopril  20 mg Oral BID     magnesium oxide  400 mg Oral BID     metoprolol tartrate  100 mg Oral BID     multivitamin w/minerals  1 tablet Oral Daily     omeprazole  20 mg Oral BID AC     potassium chloride ER  20 mEq Oral BID     rivaroxaban ANTICOAGULANT  15 mg Oral Daily with supper     sodium chloride (PF)  3 mL Intracatheter Q8H     umeclidinium  1 puff Inhalation Daily

## 2019-06-25 NOTE — PROGRESS NOTES
"   06/25/19 1354   Quick Adds   Type of Visit Initial PT Evaluation       Present no   Living Environment   Lives With child(cherie), adult   Living Arrangements house   Home Accessibility stairs to enter home;stairs within home   Number of Stairs, Main Entrance 3   Stair Railings, Main Entrance railings on both sides of stairs   Transportation Anticipated family or friend will provide   Living Environment Comment Pt lives with daughter in house with 3 steps to enter, bilateral railings. 1 flight of stairs within home, however she does not use these stairs.    Self-Care   Usual Activity Tolerance moderate   Current Activity Tolerance fair   Regular Exercise No   Equipment Currently Used at Home raised toilet;walker, standard   Activity/Exercise/Self-Care Comment previously IND with ADLs and mobility, has 2WW however does not use it   Functional Level Prior   Ambulation 1-->assistive equipment   Transferring 1-->assistive equipment   Toileting 1-->assistive equipment   Bathing 2-->assistive person   Communication 0-->understands/communicates without difficulty   Swallowing 2-->difficulty swallowing liquids   Cognition 0 - no cognition issues reported   Fall history within last six months yes   Number of times patient has fallen within last six months 1   Which of the above functional risks had a recent onset or change? fall history;ambulation;transferring   Prior Functional Level Comment patient reports she had issues with swallowing pills down, reports throat feels \"sticky\" when eating.    General Information   Onset of Illness/Injury or Date of Surgery - Date 06/24/19   Referring Physician Jazmin Corey MD   Patient/Family Goals Statement return home with daughter to OF   Pertinent History of Current Problem (include personal factors and/or comorbidities that impact the POC) Patient admitted 6/24 with generalized weakness & recent fall at home. PMH: malignant neoplasm of R lung with " lobectomy 2006, COPD, macular degeneration, HTN.   Precautions/Limitations fall precautions   Weight-Bearing Status - LUE full weight-bearing   Weight-Bearing Status - RUE full weight-bearing   Weight-Bearing Status - LLE full weight-bearing   Weight-Bearing Status - RLE full weight-bearing   General Info Comments orders: generalized weakness   Cognitive Status Examination   Orientation orientation to person, place and time   Level of Consciousness alert   Follows Commands and Answers Questions 100% of the time   Personal Safety and Judgment intact   Memory intact   Pain Assessment   Patient Currently in Pain No   Integumentary/Edema   Integumentary/Edema no deficits were identifed   Posture    Posture Forward head position   Range of Motion (ROM)   ROM Comment B UE & LE: WFL   Strength   Strength Comments bilat hip flexion 4/5, decreased functional strength    Bed Mobility   Bed Mobility Comments MOD IND supine to sit, mod A at B LEs sit to supine    Transfer Skills   Transfer Comments CGA sit<>stand   Gait   Gait Comments ambulates 5 ft with 2WW & CGA   Balance   Balance Comments decreased static sitting & dynamic standing balance   Sensory Examination   Sensory Perception no deficits were identified   Coordination   Coordination no deficits were identified   Muscle Tone   Muscle Tone no deficits were identified   Modality Interventions   Planned Modality Interventions Comments modalities as indicated   General Therapy Interventions   Planned Therapy Interventions balance training;neuromuscular re-education;strengthening;gait training;transfer training;home program guidelines;progressive activity/exercise   Intervention Comments other interventions as indicated   Clinical Impression   Criteria for Skilled Therapeutic Intervention yes, treatment indicated   PT Diagnosis decreased functional strength & mobility, decreased dynamic standing balance   Influenced by the following impairments strength, mobility, balance  "  Functional limitations due to impairments prolonged ambulation, transfers, toileting, bed mobility, ADLs   Clinical Presentation Evolving/Changing   Clinical Presentation Rationale patient is a 83 y.o. female presenting with general weakness from unkown etiology. patient reports recent fall at home from tripping on shoe. patient exhibits decreased bilateral hip strength as well as decreased functional strength with transfers, bed mobility, and ambulation. patient will benefit from continued skilled physical therapy intervention.    Clinical Decision Making (Complexity) Low complexity   Therapy Frequency Daily   Predicted Duration of Therapy Intervention (days/wks) 2-3 days   Anticipated Discharge Disposition Transitional Care Facility   Risk & Benefits of therapy have been explained Yes   Patient, Family & other staff in agreement with plan of care Yes   [facility] TM \"6 Clicks\"   2016, Trustees of [facility], under license to [employer]  All rights reserved.   6 Clicks Short Forms Basic Mobility Inpatient Short Form   [facility] AM-PAC  \"6 Clicks\" V.2 Basic Mobility Inpatient Short Form   1. Turning from your back to your side while in a flat bed without using bedrails? 4 - None   2. Moving from lying on your back to sitting on the side of a flat bed without using bedrails? 4 - None   3. Moving to and from a bed to a chair (including a wheelchair)? 3 - A Little   4. Standing up from a chair using your arms (e.g., wheelchair, or bedside chair)? 3 - A Little   5. To walk in hospital room? 3 - A Little   6. Climbing 3-5 steps with a railing? 2 - A Lot   Basic Mobility Raw Score (Score out of 24.Lower scores equate to lower levels of function) 19   Total Evaluation Time   Total Evaluation Time (Minutes) 15       Thank you for your referral.     Chana Skaggs PT, DPT    [facility]    O: [phone]  E: [email or web address]    "

## 2019-06-25 NOTE — UTILIZATION REVIEW
"    Admission Status; Secondary Review Determination         Under the authority of the Utilization Management Committee, the utilization review process indicated a secondary review on the above patient.  The review outcome is based on review of the medical records, discussions with staff, and applying clinical experience noted on the date of the review.        ()      Inpatient Status Appropriate - This patient's medical care is consistent with medical management for inpatient care and reasonable inpatient medical practice.      (x) Observation Status Appropriate - This patient does not meet hospital inpatient criteria and is placed in observation status. If this patient's primary payer is Medicare and was admitted as an inpatient, Condition Code 44 should be used and patient status changed to \"observation\".   () Admission Status NOT Appropriate - This patient's medical care is not consistent with medical management for Inpatient or Observation Status.          RATIONALE FOR DETERMINATION     \"Janice K Goodell is a 83 year old female admitted on 6/24/2019.     Generalized weakness, likely due to electrolyte abnormalities:  Hypochloremic alkalosis, hyponatremia, hypokalemia likely due to GI loss.  Status post magnesium citrate high-dose for constipation, given on Saturday.  No ongoing diarrhea.\"    Pt's electrolyte imbalances are corrected. I discussed care with the attending MD, Dr Morocho and observation status is appropriate.      The severity of illness, intensity of service provided, expected LOS make it appropriate for hospital observation.        The information on this document is developed by the utilization review team in order for the business office to ensure compliance.  This only denotes the appropriateness of proper admission status and does not reflect the quality of care rendered.         The definitions of Inpatient Status and Observation Status used in making the determination above are those " provided in the CMS Coverage Manual, Chapter 1 and Chapter 6, section 70.4.      Sincerely,     ACOSTA TUCKER MD    Physician Advisor  Utilization Review/ Case Management  [facility].

## 2019-06-25 NOTE — PROGRESS NOTES
"CLINICAL NUTRITION SERVICES  -  ASSESSMENT NOTE    RECOMMENDATIONS FOR MD/PROVIDER TO ORDER:   -Pt reports mid-upper abdominal pain that is new - recommend investigate  -Consider scheduled reglan, laxatives, and appetite stimulant  -Consider avoiding iron and calcium supplements due to constipation.   Recommendations Ordered by Registered Dietitian (RD):   -Encourage increased intake of food and fluids  -Recommend preventing constipation by including adequate whole grains, fruits, vegetables, and fluids.   -Recommend avoiding constipating foods: cheese, creamy peanut butter, bananas, excessive white rice/pasta/breads  -Boost Plus 10 AM   -2% milk with meals  -Magic cup or vanilla ice cream hs snack   Future/Additional Recommendations:   -Recommend adequate intake to prevent further weight loss after discharge  -Recommend preventing constipation by including adequate whole grains, fruits, vegetables, and fluids.   -Recommend avoiding constipating foods: cheese, creamy peanut butter, bananas, excessive refined grains   Malnutrition: Severe malnutrition  In Context of:  Acute illness or injury and Chronic illness or disease      REASON FOR ASSESSMENT  Janice K Goodell is a 83 year old female seen by Registered Dietitian for Admission Nutrition Risk Screen for reduced oral intake over the last month and RN Consult -\"decreased intake over the past month\"    NUTRITION HISTORY  - Information obtained from EMR, patient, and pt's daughter Diana who patient lives with.   Master was admitted 6/24 with 4 days of weakness, confusion, electrolyte abnormalities, and constipation. She had not had a BM in 6 days at admission, with worsening constipation issues the last 5 weeks per daughter.   Pt had a soft/loose BM evening 6/24 after enema/laxatives.   She got Reglan this AM.     PMH: COPD on oxygen, CKD3, Stage 4 Small cell lung cancer with liver mets s/p Right lobectomy 2006, chemo in 2007, immunotherapy, macular degeneration, " "Anemia, hypokalemia, HTN, community acquired pneumonia, hx of smoking (not for 18 years), appendectomy, cholecystectomy, hernia repairs    - Diet history   Pt and daughter states not on a special diet at home, no food allergies or intolerances normallly   She has had low appetite for 5 weeks due to the constipation causing fullness and nausea. Had a \"clean out\" on Saturday but still feeling full.   States she is having mid to upper abdominal pain lately as well with eating.     2 days of minimal/no intake on top of 5 weeks of lowered appetite/intake.     She is home with daughter and daughter's . Daughter and  do the cooking and grocery shopping.   Before the last 2 days of very low intake, pt typically consumes:     Breakfast-1 scrambled egg OR avocado toast OR malt o Meal Or yogurt  Lunch: sandwich or soup with crackers  Dinner: soup and sandwich, daughter and  make great variety of foods for dinner meal - pt typically has a small-moderate sized meal  Snacks: \"occasionally\" on cookies, crackers, yogurt.     Drinks: 2 cups decaf coffee/day, 50-60 ounces of water/day, occasional Ensure if doesn't eat a meal.   States \"used to\" drink a lot of milk, but no longer.   \"used to\" eat lots of fruits and veggies but not lately.   No soda, No alcohol.     - Supplements Multivitamin with minerals, ocuvite, ferrous gluconate in home med list. Pt states still takes her multivitamin    CURRENT NUTRITION ORDERS  Diet Order:     Regular   Ordered: 2% milk with all meals.   Boost Plus vanilla as AM snack  Magic cup or vanilla ice cream as hs snack.    Current Intake/Tolerance:  Pt had \"very little\" of her large breakfast this AM - states had \"just woke up\". No other intakes charted.   Pt states there is upper/mid abdomen pain she is feeling that is somewhat new for her. She also feels full (likely from constipation) and low appetite.     NUTRITION FOCUSED PHYSICAL ASSESSMENT (NFPA)  Completed:  Yes Partial " "assessment Upper body         Observed:    Muscle wasting (refer to documentation in Malnutrition section), Subcutaneous fat loss (refer to documentation in Malnutrition section) and Lackluster hair    ANTHROPOMETRICS  Height: 5' 0\"  Weight: 54 kg /  119 lbs .77 oz  Body mass index is 23.25 kg/m .  Weight Status:  Normal BMI  IBW: 45.5 kg / 100 lb.   % IBW: 119% - within appropriate range    Weight History: Per pt, UBW is 126 lb lately, but daughter states up to 135 in last year. Most of the weight loss has been in last 3 weeks.   Per chart, UBW 57.2-61.2 kg (126-135 lb) in the last 2 years.   Pt is currently down 3.2 kg (5.6% weight loss) in 1 month - SEVERE WEIGHT LOSS.     Wt Readings from Last 22 Encounters:   06/24/19 54 kg (119 lb 0.8 oz)   05/30/19 57.2 kg (126 lb)   05/04/19 57.6 kg (127 lb)   02/25/19 57.6 kg (127 lb)   02/09/19 57.6 kg (127 lb)   11/05/18 61.2 kg (135 lb)   09/14/18 58.4 kg (128 lb 12.8 oz)   09/04/18 61.9 kg (136 lb 7.4 oz)   04/29/17 57.2 kg (126 lb)   07/02/16 56.7 kg (125 lb)   05/07/13 74.8 kg (165 lb)       LABS  Na improved 129--135  K WNL 3.8   UN WNL 14  Renal function wnl: Creatinine 0.87, and GFR 62  Mag WNL 1.6  Phos wnL 3.9  BG up: 117  AST and ALT WNL  Hgb Low 10.6--9.8 (hemodilution after IV fluids) however, improved from 7.8 on 5/7/19    MEDICATIONS  Inpatient includes: IV  mL/hr stopped, diltiazam, folic acid 800 mg, heparin, lisinopril, mag oxide 400 mg 2x/day, multivitamin with minerals, omeprazole, potassium chloride, rivaroxaban, inhalers, Reglan prn, oxy-acetaminophen prn    Home meds Include: furosemide, Multivitamin with minerals albuterol, amlodipine, aspirin, cetirizine, docusate, ferrous gluconate 324 mg, flonase, lisinopril, omeprazole, oxycodone, KcL, spiriva      ASSESSED NUTRITION NEEDS PER APPROVED PRACTICE GUIDELINES:    Dosing Weight 54 kg, (actual, taken 6/24)  Estimated Energy Needs: 6213-1947 kcals (25-30 Kcal/Kg)  Justification: maintenance, " repletion and high energy needs of COPD and lung cancer. Recommend >1900 kcals while ill/desiring weight gain  Estimated Protein Needs: 77-96 grams protein (1.2-1.5 g pro/Kg)  Justification: Repletion, hypercatabolism with acute illness and high protein needs of COPD and lung cancer  Estimated Fluid Needs: 5353-7838 mL (25-30 mL/kg)  Justification: maintenance    MALNUTRITION:  % Weight Loss:  > 5% in 1 month (severe malnutrition)  % Intake:  </= 75% for >/= 1 month (severe malnutrition)  Subcutaneous Fat Loss:  Orbital region mild depletion, Upper arm region moderate depletion and Thoracic region mild depletion  Muscle Loss:  Clavicle bone region moderate depletion and Acromion bone region moderate depletion  Fluid Retention:  None noted    Malnutrition Diagnosis: Severe malnutrition  In Context of:  Acute illness or injury and Chronic illness or disease    NUTRITION DIAGNOSIS:  Inadequate oral intake and Malnutrition related to low appetite and constipation as evidenced by intake meeting  <75% needs last 5 weeks, fullness/nausea/low appetite due to constipation 5 weeks, Severe 5.6% weight loss in 1 month, moderate clavicle and acromion muscle loss      NUTRITION INTERVENTIONS  Recommendations / Nutrition Prescription  -Encourage increased intake of food and fluids  -Recommend preventing constipation by including adequate whole grains, fruits, vegetables, and fluids.   -Recommend avoiding constipating foods: cheese, creamy peanut butter, bananas, excessive white rice/pasta/bread  -Boost Plus 10 AM   -2% milk with meals  -Magic cup or vanilla ice cream hs snack      Implementation  Nutrition education: Provided education on constipation management and high calorie/high protein education.Completed education with pt and daughter yanick this Am. Verbal, handouts declined. Pt has vision issues.   Daughter with excellent understanding. Pt with moderate understanding/motivation.     Completed constipation education:  "  Discussed continuing adequate fluid intake, reasoning for this, role in preventing constipation. Pt takes 60 + ounces fluid/day recently.   Discussed increasing fiber intake - pt's diet recall shows low whole grains, low vegetables, and low fruit intake. This has decreased in the last few months per pt and daughter   Discussed foods that may be constipating and to avoid. Pt states has cut back on cheese for this reason, prior did eat \"a lot\" of cheese     Completed high calorie/protein education:   Pt with severe 5.6% weight loss in last month. Also with moderate clavicle and acromion muscle wasting.   Discussed risks of underweight, need to avoid further weight loss. Pt and daughter agreeable.   Fullness and nausea due to constipation is the main reason for lowered intake.   Discussed ways to increase intake, mentioned high calorie foods.   Pt is agreeable to magic cups, ensure home and boost here, and milk with meals. Ordered.     Wrote down pt's food preferences for foodservice staff and discussed with foodservice staff.    Composition of Meals and Snacks, Medical Food Supplement, Multivitamin/Mineral and Collaboration and Referral of Nutrition care    Nutrition Goals  -Pt to consume >50% of 3 meals/day  -Pt to consume at least 1 nutrition supplement/day  -Pt to tolerate oral intake without nausea/pain.  -Pt to maintain weight without further weight loss.      MONITORING AND EVALUATION:  Progress towards goals will be monitored and evaluated per protocol and Practice Guidelines      "

## 2019-06-26 ENCOUNTER — APPOINTMENT (OUTPATIENT)
Dept: PHYSICAL THERAPY | Facility: CLINIC | Age: 84
End: 2019-06-26
Payer: MEDICARE

## 2019-06-26 VITALS
RESPIRATION RATE: 20 BRPM | BODY MASS INDEX: 23.97 KG/M2 | DIASTOLIC BLOOD PRESSURE: 54 MMHG | OXYGEN SATURATION: 94 % | SYSTOLIC BLOOD PRESSURE: 135 MMHG | WEIGHT: 122.1 LBS | HEART RATE: 77 BPM | TEMPERATURE: 98 F | HEIGHT: 60 IN

## 2019-06-26 LAB
ANION GAP SERPL CALCULATED.3IONS-SCNC: 7 MMOL/L (ref 3–14)
BACTERIA SPEC CULT: NORMAL
BUN SERPL-MCNC: 15 MG/DL (ref 7–30)
CALCIUM SERPL-MCNC: 9.4 MG/DL (ref 8.5–10.1)
CHLORIDE SERPL-SCNC: 91 MMOL/L (ref 94–109)
CO2 SERPL-SCNC: 40 MMOL/L (ref 20–32)
CREAT SERPL-MCNC: 0.82 MG/DL (ref 0.52–1.04)
GFR SERPL CREATININE-BSD FRML MDRD: 67 ML/MIN/{1.73_M2}
GLUCOSE SERPL-MCNC: 109 MG/DL (ref 70–99)
POTASSIUM SERPL-SCNC: 4.1 MMOL/L (ref 3.4–5.3)
SODIUM SERPL-SCNC: 138 MMOL/L (ref 133–144)
SPECIMEN SOURCE: NORMAL
WBC # BLD AUTO: 12 10E9/L (ref 4–11)

## 2019-06-26 PROCEDURE — 25000128 H RX IP 250 OP 636: Performed by: NURSE PRACTITIONER

## 2019-06-26 PROCEDURE — 25000132 ZZH RX MED GY IP 250 OP 250 PS 637: Performed by: INTERNAL MEDICINE

## 2019-06-26 PROCEDURE — G0378 HOSPITAL OBSERVATION PER HR: HCPCS

## 2019-06-26 PROCEDURE — 25000132 ZZH RX MED GY IP 250 OP 250 PS 637: Mod: GY | Performed by: FAMILY MEDICINE

## 2019-06-26 PROCEDURE — 40000894 ZZH STATISTIC OT IP EVAL DEFER

## 2019-06-26 PROCEDURE — 80048 BASIC METABOLIC PNL TOTAL CA: CPT | Performed by: INTERNAL MEDICINE

## 2019-06-26 PROCEDURE — 85048 AUTOMATED LEUKOCYTE COUNT: CPT | Performed by: INTERNAL MEDICINE

## 2019-06-26 PROCEDURE — 99217 ZZC OBSERVATION CARE DISCHARGE: CPT | Performed by: FAMILY MEDICINE

## 2019-06-26 PROCEDURE — 96376 TX/PRO/DX INJ SAME DRUG ADON: CPT

## 2019-06-26 PROCEDURE — 97116 GAIT TRAINING THERAPY: CPT | Mod: GP

## 2019-06-26 PROCEDURE — 25000128 H RX IP 250 OP 636: Performed by: INTERNAL MEDICINE

## 2019-06-26 RX ORDER — DESONIDE 0.5 MG/G
CREAM TOPICAL PRN
COMMUNITY
Start: 2019-06-26

## 2019-06-26 RX ORDER — TRIAMCINOLONE ACETONIDE 0.1 %
PASTE (GRAM) DENTAL 2 TIMES DAILY PRN
COMMUNITY
Start: 2019-06-26

## 2019-06-26 RX ORDER — AMOXICILLIN 250 MG
2 CAPSULE ORAL 2 TIMES DAILY
Status: DISCONTINUED | OUTPATIENT
Start: 2019-06-26 | End: 2019-06-26 | Stop reason: HOSPADM

## 2019-06-26 RX ORDER — POLYETHYLENE GLYCOL 3350 17 G/17G
17 POWDER, FOR SOLUTION ORAL DAILY
Qty: 30 PACKET | Refills: 0 | Status: SHIPPED | OUTPATIENT
Start: 2019-06-27

## 2019-06-26 RX ORDER — MAGNESIUM OXIDE 400 MG/1
400 TABLET ORAL 2 TIMES DAILY
Qty: 60 TABLET | Refills: 0 | Status: SHIPPED | OUTPATIENT
Start: 2019-06-26

## 2019-06-26 RX ORDER — DOCUSATE SODIUM 100 MG/1
200 CAPSULE, LIQUID FILLED ORAL 2 TIMES DAILY
Qty: 120 CAPSULE | Refills: 0 | Status: SHIPPED | OUTPATIENT
Start: 2019-06-26

## 2019-06-26 RX ORDER — CALCIUM CARBONATE 500 MG/1
500 TABLET, CHEWABLE ORAL 3 TIMES DAILY PRN
Status: DISCONTINUED | OUTPATIENT
Start: 2019-06-26 | End: 2019-06-26 | Stop reason: HOSPADM

## 2019-06-26 RX ORDER — HYDRALAZINE HYDROCHLORIDE 25 MG/1
25 TABLET, FILM COATED ORAL 2 TIMES DAILY
Status: DISCONTINUED | OUTPATIENT
Start: 2019-06-26 | End: 2019-06-26 | Stop reason: HOSPADM

## 2019-06-26 RX ORDER — POLYETHYLENE GLYCOL 3350 17 G/17G
17 POWDER, FOR SOLUTION ORAL DAILY
Status: DISCONTINUED | OUTPATIENT
Start: 2019-06-27 | End: 2019-06-26 | Stop reason: HOSPADM

## 2019-06-26 RX ADMIN — LISINOPRIL 20 MG: 10 TABLET ORAL at 08:39

## 2019-06-26 RX ADMIN — FLUTICASONE PROPIONATE 1 SPRAY: 50 SPRAY, METERED NASAL at 08:45

## 2019-06-26 RX ADMIN — ONDANSETRON 4 MG: 2 INJECTION INTRAMUSCULAR; INTRAVENOUS at 14:47

## 2019-06-26 RX ADMIN — POTASSIUM CHLORIDE 20 MEQ: 1500 TABLET, EXTENDED RELEASE ORAL at 08:38

## 2019-06-26 RX ADMIN — HEPARIN, PORCINE (PF) 10 UNIT/ML INTRAVENOUS SYRINGE 5 ML: at 06:19

## 2019-06-26 RX ADMIN — CALCIUM CARBONATE (ANTACID) CHEW TAB 500 MG 500 MG: 500 CHEW TAB at 00:37

## 2019-06-26 RX ADMIN — METOPROLOL TARTRATE 200 MG: 100 TABLET, FILM COATED ORAL at 08:39

## 2019-06-26 RX ADMIN — POLYETHYLENE GLYCOL 3350 17 G: 17 POWDER, FOR SOLUTION ORAL at 08:38

## 2019-06-26 RX ADMIN — HYDRALAZINE HYDROCHLORIDE 25 MG: 25 TABLET ORAL at 08:39

## 2019-06-26 RX ADMIN — OMEPRAZOLE 20 MG: 20 CAPSULE, DELAYED RELEASE ORAL at 08:38

## 2019-06-26 RX ADMIN — DILTIAZEM HYDROCHLORIDE 240 MG: 120 CAPSULE, COATED, EXTENDED RELEASE ORAL at 08:38

## 2019-06-26 RX ADMIN — OXYCODONE HYDROCHLORIDE AND ACETAMINOPHEN 1 TABLET: 5; 325 TABLET ORAL at 04:41

## 2019-06-26 RX ADMIN — MAGNESIUM OXIDE TAB 400 MG (241.3 MG ELEMENTAL MG) 400 MG: 400 (241.3 MG) TAB at 08:38

## 2019-06-26 RX ADMIN — ONDANSETRON 4 MG: 2 INJECTION INTRAMUSCULAR; INTRAVENOUS at 04:38

## 2019-06-26 RX ADMIN — ACETAMINOPHEN 800 MCG: 400 TABLET ORAL at 08:39

## 2019-06-26 RX ADMIN — FLUTICASONE FUROATE AND VILANTEROL TRIFENATATE 1 PUFF: 200; 25 POWDER RESPIRATORY (INHALATION) at 08:45

## 2019-06-26 RX ADMIN — SENNOSIDES AND DOCUSATE SODIUM 2 TABLET: 8.6; 5 TABLET ORAL at 10:14

## 2019-06-26 RX ADMIN — HYDRALAZINE HYDROCHLORIDE 25 MG: 25 TABLET ORAL at 00:36

## 2019-06-26 RX ADMIN — MULTIPLE VITAMINS W/ MINERALS TAB 1 TABLET: TAB at 08:38

## 2019-06-26 RX ADMIN — HEPARIN, PORCINE (PF) 10 UNIT/ML INTRAVENOUS SYRINGE 5 ML: at 14:47

## 2019-06-26 RX ADMIN — UMECLIDINIUM 1 PUFF: 62.5 AEROSOL, POWDER ORAL at 08:45

## 2019-06-26 ASSESSMENT — MIFFLIN-ST. JEOR: SCORE: 940.34

## 2019-06-26 NOTE — DISCHARGE INSTRUCTIONS
Return home medications to patient at discharge. Medications are currently stored in medication bin as well as one medication locked upstairs with pharmacy.       Hospital follow up visit:  Dr. Henao  Tuesday July 2nd at 10:30am  [facility]  [phone]

## 2019-06-26 NOTE — PROGRESS NOTES
S-(situation): End of shift note    B-(background): weakness and hyponatremia    A-(assessment): Alert and oriented. VSS. Afebrile. complaining of some upper abdominal discomfort and given percocet and zofran with some relief. On 2L of 02 and lung sounds are clear. Up with assistX1 with walker and gait belt to bedside commode. Port is heparin locked. Continue to monitor. Blood pressure 197/71, pulse 72, temperature 98.7  F (37.1  C), temperature source Oral, resp. rate 18, height 1.524 m (5'), weight 54 kg (119 lb 0.8 oz), SpO2 99 %.    R-(recommendations): Continue to monitor

## 2019-06-26 NOTE — PROGRESS NOTES
SPIRITUAL HEALTH SERVICES  SPIRITUAL ASSESSMENT Progress Note  [facility]      During Rounding,  introduced himself to Aurelia Li her daughter and informed them of his availability.    Jimi Ching M.Div., [facility]  Staff   Office tel: [phone]  '

## 2019-06-26 NOTE — PROGRESS NOTES
Called and spoke with daughter Diana. Advised of 3pm discharge to [facility].  Diana will bring pt's portable oxygen with her.   TCU notified of arrival time and aware of 6000$ down payment.       Kath Aguilera BSN, RN, PHN  RN Care Coordinator  Care Transitions Department  [facility] [phone]  [facility] [phone]

## 2019-06-26 NOTE — PLAN OF CARE
"Problem: Patient Care Overview  Goal: Plan of Care/Patient Progress Review  S-(situation): OT treatment    B-(background): Pt is an 83 year old female being seen for OT treatment after being admitted via ED with generalized weakness. Attempted OT session, pt sleeping soundly upon arrival and required extended time to arouse. Encouraged pt to participate in OT session, however pt refused OT treatment, noting \"I'm so tired I really just want to sleep.\" Pt reports she has ambulated to/from bathroom x 2 this date.     A-(assessment): Will attempt OT session this PM as available    R-(recommendations): Based on OT evaluation 6/25/19 pt recommended to TCU, however per discussion with social work family may be considering home with HH OT and HH PT. Pt's greatest barrier to returning home at this time is estimated 30 ft walk from bedroom/living room to bathroom. If pt were to return home recommend bedside commode for safety with toileting within her home environment.     1445 PM: Attempted PM OT treatment session. Pt taking a shower and preparing for discharge with NSA upon OT arrival. Pt set to discharge to TCU at 1500. Recommend pt to continue skilled OT services within TCU setting.         Occupational Therapy Discharge Summary    Reason for therapy discharge:    Discharged to transitional care facility.    Progress towards therapy goal(s). See goals on Care Plan in [facility] electronic health record for goal details.  Goals partially met.  Barriers to achieving goals:   limited tolerance for therapy and discharge from facility.    Therapy recommendation(s):    Continued therapy is recommended.  Rationale/Recommendations:  to progress independence and safety with ADL prior to returning home.      FELICITY Weems/L  [facility]  [phone]  "

## 2019-06-26 NOTE — DISCHARGE SUMMARY
[facility]  Hospitalist Discharge Summary       Date of Admission:  6/24/2019  Date of Discharge:  6/26/2019  Discharging Provider: Ingrid Doan MD  Discharge Diagnoses   Active Problems:    COPD (chronic obstructive pulmonary disease) (H)    CKD (chronic kidney disease) stage 3, GFR 30-59 ml/min (H)    HTN (hypertension)    Hyponatremia    Hypochloremia    Generalized muscle weakness    Weakness    Personal history of Lung cancer - right lobectomy 2006, chemo 2007    Follow-ups Needed After Discharge   Follow-up Appointments     Follow Up and recommended labs and tests      BMP to be performed on 7/1/19.           Discharge Disposition   Discharged to [facility]  Condition at discharge: Stable    Hospital Course   Janice K Goodell is a 83 year old female admitted on 6/24/2019 because of generalized weakness and electrolyte abnormalities.  Patient has been struggling with constipation and was instructed by her GI provider to perform mag citrate high-dose administration for bowel purge, following which she had large amounts of diarrhea and subsequent weakness.  On initial presentation she was found to have severe electrolyte abnormalities including a chloride of 79, sodium of 129 and magnesium of 1.6.  She was placed under observation status for electrolyte replacement rehydration and further evaluation of her strengthening.  Although her electrolytes continue to improve and patient has had no further diarrhea, she continues to have significant weakness and discharged to TCU is recommended and has been arranged.  Patient is discharged in stable condition.     Generalized weakness, likely due to electrolyte abnormalities:  Hypochloremic alkalosis, hyponatremia, hypokalemia likely due to GI loss.  Status post magnesium citrate high-dose for constipation, given on Saturday.  No ongoing diarrhea.  Electrolytes continue to improve following IV  fluid resuscitation  Patient will be discharged to TCU for ongoing strengthening     Abdominal pain and bloating  The pain is mainly epigastric.  The patient also reports some difficulty with swallowing pills.  This is a new symptom.  Symptoms could be secondary to dyspepsia/gastritis/reflux esophagitis  X-ray of the abdomen with nonspecific gas pattern.  Consider upper GI endoscopy as an outpatient if symptoms are ongoing.  Discharge with ongoing PPI     Paroxysmal atrial fibrillation.  Sinus rhythm on telemetry.  Normal sinus rhythm on EKG from 5/7/2019.  Continue home dose of Cardizem, metoprolol and Xarelto.   Patient will discharge to TCU without change to home regimen     COPD with chronic hypoxic respiratory failure, on 3 L/min of oxygen for 3 years, chronic and stable.  Continue home medications.  As needed albuterol mdi at the TCU setting     History of lung cancer.  Status post right upper lobectomy.  Currently on immunotherapy with Opdivo.  Follows with [place] [facility].  As needed Percocet for pain.  Recurrent pleural effusion, the left, residual pleural effusion seen on current chest x-ray.  Ongoing outpatient follow-up as previously recommended     Essential hypertension.  Continue home dose of  lisinopril, Cardizem, with holding parameters at time of discharge     Fall at home, on 6/22/2019.  It appears to be mechanical fall, when patient tripped over her shoes.  She hit her head.  No loss of consciousness.  CT head negative for intracranial hemorrhage.  Patient on Xarelto.  Neuro status remains stable during this observation and will proceed with discharge to TCU as above    Consultations This Hospital Stay   PHYSICAL THERAPY ADULT IP CONSULT  OCCUPATIONAL THERAPY ADULT IP CONSULT  SOCIAL WORK IP CONSULT  NUTRITION SERVICES ADULT IP CONSULT  PHYSICAL THERAPY ADULT IP CONSULT  OCCUPATIONAL THERAPY ADULT IP CONSULT    Code Status   Full Code    Time Spent on this Encounter   Ingrid SHRESTHA  Franki, personally saw the patient today and spent greater than 30 minutes discharging this patient.       Ingrid Doan MD  [facility]  ______________________________________________________________________    Physical Exam   Vital Signs: Temp: 98  F (36.7  C) Temp src: Oral BP: 135/54 Pulse: 77   Resp: 20 SpO2: 94 % O2 Device: Nasal cannula Oxygen Delivery: 2 LPM  Weight: 122 lbs 1.6 oz  Constitutional: awake, alert, cooperative, no apparent distress, and appears stated age  Respiratory: No increased work of breathing, good air exchange, clear to auscultation bilaterally, no crackles or wheezing  Cardiovascular: Normal apical impulse, regular rate and rhythm  GI: Bowel sounds present, abdomen soft and nontender  Skin: normal skin color, texture, turgor  Musculoskeletal: no lower extremity pitting edema present  Neurologic: Awake, alert, oriented to name, place and time and overall the situation.       Primary Care Physician   LIZETTE DUBOIS    Discharge Orders      General info for SNF    Length of Stay Estimate: Short Term Care: Estimated # of Days <30  Condition at Discharge: Improving  Level of care:skilled   Rehabilitation Potential: Good  Admission H&P remains valid and up-to-date: Yes  Recent Chemotherapy: N/A  Use Nursing Home Standing Orders: Yes     Mantoux instructions    Give two-step Mantoux (PPD) Per Facility Policy Yes     Reason for your hospital stay    Generalized weakness and electrolyte abnormalities because of a stool cleanse that had been performed because of severe constipation.     Daily weights    Call Provider for weight gain of more than 2 pounds per day or 5 pounds per week.     Follow Up and recommended labs and tests    BMP to be performed on 7/1/19.     Activity - Up with nursing assistance     Physical Therapy Adult Consult    Evaluate and treat as clinically indicated.    Reason:  Generalized weakness, deconditioning, following  treatment of constipation with subsequent diarrhea with electrolyte abnormalities.     Occupational Therapy Adult Consult    Evaluate and treat as clinically indicated.    Reason:  Generalized weakness, deconditioning, following treatment for constipation with subsequent diarrhea with electrolyte abnormalities.     Oxygen - Nasal cannula    3 Lpm by nasal cannula continuously as per home regimen     Advance Diet as Tolerated    Follow this diet upon discharge:  Regular Adult diet with       Snacks/Supplements Adult: Boost Plus; Between Meals      Snacks/Supplements Adult: Magic Cup; Between Meals     Discharge Medications   Current Discharge Medication List      START taking these medications    Details   magnesium oxide (MAG-OX) 400 MG tablet Take 1 tablet (400 mg) by mouth 2 times daily  Qty: 60 tablet, Refills: 0    Associated Diagnoses: Hypomagnesemia      polyethylene glycol (MIRALAX/GLYCOLAX) packet Take 17 g by mouth daily  Qty: 30 packet, Refills: 0    Associated Diagnoses: Constipation, unspecified constipation type         CONTINUE these medications which have CHANGED    Details   desonide (DESOWEN) 0.05 % external cream Apply topically as needed To nose lesion twice a day      docusate sodium (STOOL SOFTENER) 100 MG capsule Take 2 capsules (200 mg) by mouth 2 times daily  Qty: 120 capsule, Refills: 0    Associated Diagnoses: Constipation, unspecified constipation type      triamcinolone (KENALOG) 0.1 % paste 2 times daily as needed For mouth lesion         CONTINUE these medications which have NOT CHANGED    Details   albuterol (PROAIR HFA/PROVENTIL HFA/VENTOLIN HFA) 108 (90 Base) MCG/ACT inhaler Inhale 2 puffs into the lungs 4 times daily as needed      cetirizine (ZYRTEC) 10 MG tablet Take 1 tablet by mouth 2 times daily as needed (itch, swelling of lips).  Qty: 20 tablet, Refills: 0      fluticasone (FLONASE) 50 MCG/ACT spray Spray 1 spray in nostril daily      fluticasone-salmeterol (ADVAIR DISKUS)  500-50 MCG/DOSE diskus inhaler Inhale 1 puff into the lungs 2 times daily      folic acid 800 MCG tablet Take 800 mcg by mouth daily      hydrALAZINE (APRESOLINE) 25 MG tablet Take 25 mg by mouth 2 times daily      lisinopril (PRINIVIL/ZESTRIL) 10 MG tablet Take 20 mg by mouth 2 times daily       metoprolol tartrate (LOPRESSOR) 50 MG tablet Take 1 tablet (50 mg) by mouth 2 times daily  Qty: 60 tablet, Refills: 0      MULTIPLE VITAMIN ESSENTIAL TABS Take 1 tablet by mouth daily      Multiple Vitamins-Minerals (OCUVITE PRESERVISION PO) Take 1 tablet by mouth 2 times daily      omeprazole (PRILOSEC) 20 MG CR capsule Take 20 mg by mouth 2 times daily      oxyCODONE-acetaminophen (PERCOCET) 5-325 MG tablet Take 1 tablet by mouth every 12 hours as needed for pain  Qty: 12 tablet, Refills: 0      potassium chloride SA (K-DUR/KLOR-CON M) 10 MEQ CR tablet Take 10 mEq by mouth 2 times daily (with meals)      tiotropium (SPIRIVA) 18 MCG capsule Inhale 18 mcg into the lungs daily      BACTROBAN 2 % EX OINT in nose prn      diltiazem ER (TIAZAC) 240 MG 24 hr ER beaded capsule Take 1 capsule (240 mg) by mouth daily for 5 doses  Qty: 5 capsule, Refills: 0      rivaroxaban ANTICOAGULANT (XARELTO) 15 MG TABS tablet Take 15 mg by mouth daily (with dinner)         STOP taking these medications       furosemide (LASIX) 40 MG tablet Comments:   Reason for Stopping:             Allergies   Allergies   Allergen Reactions     Sulfa Drugs      Tongue felt fat- no rash, no SOB  Other reaction(s): Edema     Adhesive Tape Rash     reacted to the adhesive on a Catarpes patch-not allergic to the Catapres-also reacted to the adhesive on leads from her Holter Monitor-not allergic to Band-Aids     Morphine Sulfate      Amoxicillin      Tongue felt fat- no rash, no SOB     Ceftriaxone Other (See Comments)     Dysphagia, but tolerates with benadryl, however has a long history of intermittent dysphagia. Unclear it is from the antibiotic      Doxycycline Swelling     Levaquin      Tongue felt fat- no rash, no SOB,     Re-trial of Levaquin made 9/2012 with angioedema reaction to lips.     Zithromax [Azithromycin Dihydrate]      Tongue felt fat- no rash, no SOB     Thiazide-Type Diuretics Rash

## 2019-06-26 NOTE — PROGRESS NOTES
S: Patient discharged to [facility] via w/c with family    B: Observation goals met     A: Generalized weakness but able to ambulate to bathroom with walker and stand by assist. Alert and oriented, cooperative. Na = 138 today. Tolerating regular diet.     R: Discharge instructions reviewed with patient. Listed belongings gathered and returned to patient.    Patient Education resolved: Yes  New medications-Pt. Has been educated about reason of use and side effects Yes  Home and hospital acquired medications returned to patient Yes  Medication Bin checked and emptied on discharge Yes

## 2019-06-26 NOTE — PLAN OF CARE
"Discharge Planner PT   Patient plan for discharge: continues to report \"I'm not safe to go home yet\"  Current status: Patient is an 83 year old year old female admitted 6/24 with generalized weakness. Patient is in bathroom with CNA upon PT arrival, patient requires 3 minute seated rest break after toileting before agreeable to continue ambulation. Patient performs sit to stand with CGA, ambulates into hallway x 15 ft total with 2WW, CGA, & 2 LO2 via NC/O2 tank. Patient c/o increased fatigue & \"wobbly\" legs after short distance ambulation. O2 saturation at 87% upon return from ambulation, which increases to 90% following 1 minute of sitting.   Barriers to return to prior living situation: 3 steps to enter home, decreased activity tolerance, vision deficits, fear of falls  Recommendations for discharge:  PT if 24/7 assistance is available, otherwise TCU with PT/OT intervention  Rationale for recommendations: Patient with decreased functional strength with bed mobility, transfers, and ambulation. High anxiety & fear of falls. Patient would benefit from continued skilled therapeutic intervention in order to progress towards her desired level of function.       Entered by: Chana Skaggs 06/26/2019 12:05 PM       Physical Therapy Discharge Summary    Reason for therapy discharge:    Discharged to transitional care facility.    Progress towards therapy goal(s). See goals on Care Plan in [facility] electronic health record for goal details.  Goals partially met.  Barriers to achieving goals:   discharge from facility.    Therapy recommendation(s):    see above      " Recommended observation.

## 2019-06-26 NOTE — PROGRESS NOTES
"CLINICAL NUTRITION SERVICES  -  ASSESSMENT NOTE     RECOMMENDATIONS FOR MD/PROVIDER TO ORDER:   -Pt reports mid-upper abdominal pain that is new - recommend investigate  -Consider scheduled reglan, laxatives, and appetite stimulant  -Consider avoiding iron and calcium supplements due to constipation.   Recommendations Ordered by Registered Dietitian (RD):   -Encourage increased intake of food and fluids  -Recommend preventing constipation by including adequate whole grains, fruits, vegetables, and fluids.   -Recommend avoiding constipating foods: cheese, creamy peanut butter, bananas, excessive white rice/pasta/breads  -Boost Plus 10 AM   -2% milk with meals  -Magic cup or vanilla ice cream afternoon and hs snack   Future/Additional Recommendations:   -Recommend adequate intake to prevent further weight loss after discharge  -Recommend preventing constipation by including adequate whole grains, fruits, vegetables, and fluids.   -Recommend avoiding constipating foods: cheese, creamy peanut butter, bananas, excessive refined grains   Malnutrition: Severe malnutrition  In Context of:  Acute illness or injury and Chronic illness or disease     Pt admitted 6/24 with weakness, electrolyte abnormalities, severe constipation, weight loss, low intake 5 weeks.     Diet: Regular.   2% milk all meals  Vanilla boost AM snack  Magic cup or vanilla ice cream afternoon and hs snack    Intake:   6/25: pt had \"very little\" intake. Bites of breakfst. No lunch, no supper per pt. No intakes charted in EMR. She states she had \"half\" of her Boost yesterday AM, and tolerated OK but made her \"very full\"    6/26: At my visit this AM, pt is eating better for breakfast - 50% pancakes, sausage, milk, juice. Encouraged her efforts.     Weight: 55.4 kg. Up 1.4 kg from yesterday 54 kg. Net +1.3 Liters.   Ordered weights to be taken per nurse's schedule.     Labs:   Na wnL 138  K WNL 4.1  Creatinine wnL 0.82, GFR WNL 67  Calcium WNL 9.4  BG " 109    Meds: IV fluids d/cd, folic acid, heparin, hydralazine, lisinopril, Magnesium oxide 800 mg/day, lopressor, omeprazole, miralax, senna docusate,   Planning to schedule bowel meds today.     Answered all Pt and daughter's questions for me this AM. Encouraged pt to continue to take in as much as she can. Pt interested to try the Magic cup supplement today.     Trell Lino RDN, LD  Clinical Dietitian  Phone: [phone]

## 2019-06-26 NOTE — PROGRESS NOTES
Janice K Goodell  Gender: female  : 1935  [address]  [facility] [address]  [phone] (home)     Medical Record: [id number]  Pharmacy:    [facility] - [address] HURST 2019 - [place], [place] - [address]  [facility]  [facility] - [address]  [facility] - [place], [place] - [address]  Primary Care Provider: Michael Henao    Parent's names are: Data Unavailable (mother) and Data Unavailable (father).      [facility]  2019     Discharge Phone Call:  Discharge to  [facility]

## 2019-06-26 NOTE — PROGRESS NOTES
S-(situation): shift summary    B-(background): patient admitted with weakness, hyponatremia    A-(assessment): Patient is alert, oriented, compliant with cares. This morning, patient stated she was having a hard time swallowing and she had epigastric pain. This writer notified the provider. Patient also had questions regarding home medications like hydralazine and lopressor dosing. This writer passed this information on to provider. Patient had x-ray today. Patient is one person assist to the commode. Patient has intermittent, non productive cough.    R-(recommendations): Continue to monitor and follow POC    Ailyn Vázquez RN on 6/25/2019 at 7:55 PM

## 2019-06-26 NOTE — PROGRESS NOTES
Name: Janice K Goodell    MRN#: [id number]    Reason for Hospitalization: Weakness [R53.1]  Light headed [R42]  Malignant neoplasm of upper lobe of right lung (H) [C34.11]  Chronic obstructive pulmonary disease, unspecified COPD type (H) [J44.9]    Discharge Date: 6/26/2019    Patient / Family response to discharge plan: in agreement    Other Providers (Care Coordinator, County Services, PCA services etc): No    CTS Hand Off Completed: Yes    PAS #: [id number]    CHOCO Score: N/A - OBS    Future Appointments: No future appointments.    Discharge Disposition: transitional care unit - [facility] (Main Phone: [phone] Admissions Phone: [phone] Fax: [phone]).  Family transport.  Patient/family are aware that patient will be private pay at the TCU.    Discharge Planner   Discharge Plans in progress: TCU - [facility]  Barriers to discharge plan: None  Follow up plan: TCU and PCP       Entered by: Rocio Scott 06/26/2019 2:50 PM     MILDRED Holt  [facility] [phone]/ [place] [phone]

## 2019-06-27 ENCOUNTER — NURSING HOME VISIT (OUTPATIENT)
Dept: GERIATRICS | Facility: CLINIC | Age: 84
End: 2019-06-27
Payer: COMMERCIAL

## 2019-06-27 VITALS
OXYGEN SATURATION: 99 % | HEIGHT: 60 IN | DIASTOLIC BLOOD PRESSURE: 73 MMHG | BODY MASS INDEX: 24.05 KG/M2 | SYSTOLIC BLOOD PRESSURE: 180 MMHG | TEMPERATURE: 97.3 F | WEIGHT: 122.5 LBS | RESPIRATION RATE: 17 BRPM | HEART RATE: 89 BPM

## 2019-06-27 DIAGNOSIS — C34.11 MALIGNANT NEOPLASM OF UPPER LOBE OF RIGHT LUNG (H): ICD-10-CM

## 2019-06-27 DIAGNOSIS — I10 ESSENTIAL HYPERTENSION: ICD-10-CM

## 2019-06-27 DIAGNOSIS — K59.01 SLOW TRANSIT CONSTIPATION: ICD-10-CM

## 2019-06-27 DIAGNOSIS — K21.00 GASTROESOPHAGEAL REFLUX DISEASE WITH ESOPHAGITIS: ICD-10-CM

## 2019-06-27 DIAGNOSIS — M62.81 GENERALIZED MUSCLE WEAKNESS: Primary | ICD-10-CM

## 2019-06-27 DIAGNOSIS — R52 GENERALIZED PAIN: ICD-10-CM

## 2019-06-27 DIAGNOSIS — J44.9 CHRONIC OBSTRUCTIVE PULMONARY DISEASE, UNSPECIFIED COPD TYPE (H): ICD-10-CM

## 2019-06-27 DIAGNOSIS — I48.0 PAROXYSMAL ATRIAL FIBRILLATION (H): ICD-10-CM

## 2019-06-27 DIAGNOSIS — F41.1 GENERALIZED ANXIETY DISORDER: ICD-10-CM

## 2019-06-27 PROCEDURE — 99310 SBSQ NF CARE HIGH MDM 45: CPT | Performed by: NURSE PRACTITIONER

## 2019-06-27 RX ORDER — DILTIAZEM HYDROCHLORIDE 240 MG/1
240 CAPSULE, EXTENDED RELEASE ORAL DAILY
COMMUNITY

## 2019-06-27 ASSESSMENT — MIFFLIN-ST. JEOR: SCORE: 942.16

## 2019-06-27 NOTE — LETTER
6/27/2019        RE: Janice K Goodell  [address]  [facility] [address]        [place] [facility]  PRIMARY CARE PROVIDER AND CLINIC:  LIZETTE DUBOIS [facility] [address] / [place] [address]  Chief Complaint   Patient presents with     Hospital F/U     [facility] Medical Record Number:  [id number]  Place of Service where encounter took place:  [facility] [place] ([facility] [[id number]]    Janice K Goodell  is a 83 year old  (1935), admitted to the above facility from  [facility]. Hospital stay 6/24/19 through 6/26/19..  Admitted to this facility for  rehab, medical management and nursing care.    HPI:    HPI information obtained from: facility chart records, facility staff, patient report, [facility] chart review and family/first contact daughter report.   Brief Summary of Hospital Course:   Master presented to [facility] due to weakness following a bout of constipation and diarrhea as coordinated with her GI doctor.  Hospital found her electrolytes to be off and so IV fluids help correct this but continued with weakness.  Therapies suggested that she come to a TCU for strengthening/mobility.  BMP is ordered for 7/1/19 for follow up.  Had some difficulty already swallowing pills at the hospital and if this continued then suggested outpatient evaluation of upper GI.  Updates on Status Since Skilled nursing Admission:   Today met with the daughter as she is advocating for something to help her mother with the pain in the pit of her stomach.  Daughter relates to this pain and feels it is symptoms of GERD.  Is already on omeprazole twice a day but could add Carafate to see if that would help as well.  Daughter concerned as well for her appetite.    Soledad is in bed with the HOB elevated.  Able to communicate her needs.  She talked about her hx with cancer.  Has been on oxygen for some years due to part of lung removed.  Main complaint is her stomach.   Just wants to get stronger in order to return back home.      CODE STATUS/ADVANCE DIRECTIVES DISCUSSION:   CPR/Full code   Patient's living condition: lives with family, daughter for now   ALLERGIES: Sulfa drugs; Adhesive tape; Morphine sulfate; Amoxicillin; Ceftriaxone; Doxycycline; Levaquin; Zithromax [azithromycin dihydrate]; and Thiazide-type diuretics  PAST MEDICAL HISTORY:  has a past medical history of COPD (chronic obstructive pulmonary disease) (H), Heart valve disorder, Hypertension, Macular degeneration, Malignant neoplasm (H) (2006), and Plantar fasciitis.  PAST SURGICAL HISTORY:   has a past surgical history that includes cataract iol, rt/lt (6/26/2008); REMV CATARACT EXTRACAP,INSERT LENS (04/23/09); Right lung - lobectomy (2006); hernia repair; Cholecystectomy; appendectomy; Colorectal surgery; ENT surgery; biopsy (2006); colonoscopy; and Laser YAG capsulotomy (7/24/2014).  FAMILY HISTORY: family history is not on file.  SOCIAL HISTORY:   reports that she quit smoking about 17 years ago. She does not have any smokeless tobacco history on file. She reports that she does not drink alcohol or use drugs.    Post Discharge Medication Reconciliation Status: discharge medications reconciled and changed, per note/orders (see AVS)    Current Outpatient Medications   Medication Sig Dispense Refill     albuterol (PROAIR HFA/PROVENTIL HFA/VENTOLIN HFA) 108 (90 Base) MCG/ACT inhaler Inhale 2 puffs into the lungs 4 times daily as needed       BACTROBAN 2 % EX OINT in nose TID prn       cetirizine (ZYRTEC) 10 MG tablet Take 1 tablet by mouth 2 times daily as needed (itch, swelling of lips). 20 tablet 0     desonide (DESOWEN) 0.05 % external cream Apply topically as needed To nose lesion twice a day       diltiazem ER (TIAZAC) 240 MG 24 hr ER beaded capsule Take 240 mg by mouth daily       docusate sodium (STOOL SOFTENER) 100 MG capsule Take 2 capsules (200 mg) by mouth 2 times daily 120 capsule 0     fluticasone  (FLONASE) 50 MCG/ACT spray Spray 1 spray in nostril daily       fluticasone-salmeterol (ADVAIR DISKUS) 500-50 MCG/DOSE diskus inhaler Inhale 1 puff into the lungs 2 times daily       folic acid 800 MCG tablet Take 800 mcg by mouth daily       hydrALAZINE (APRESOLINE) 25 MG tablet Take 25 mg by mouth 2 times daily       lisinopril (PRINIVIL/ZESTRIL) 10 MG tablet Take 20 mg by mouth 2 times daily        magnesium oxide (MAG-OX) 400 MG tablet Take 1 tablet (400 mg) by mouth 2 times daily 60 tablet 0     metoprolol tartrate (LOPRESSOR) 50 MG tablet Take 1 tablet (50 mg) by mouth 2 times daily 60 tablet 0     MULTIPLE VITAMIN ESSENTIAL TABS Take 1 tablet by mouth daily       Multiple Vitamins-Minerals (OCUVITE PRESERVISION PO) Take 1 tablet by mouth 2 times daily       omeprazole (PRILOSEC) 20 MG CR capsule Take 20 mg by mouth 2 times daily       oxyCODONE-acetaminophen (PERCOCET) 5-325 MG tablet Take 1 tablet by mouth every 12 hours as needed for pain 12 tablet 0     polyethylene glycol (MIRALAX/GLYCOLAX) packet Take 17 g by mouth daily 30 packet 0     potassium chloride SA (K-DUR/KLOR-CON M) 10 MEQ CR tablet Take 10 mEq by mouth 2 times daily (with meals)       rivaroxaban ANTICOAGULANT (XARELTO) 15 MG TABS tablet Take 15 mg by mouth daily (with dinner)       tiotropium (SPIRIVA) 18 MCG capsule Inhale 18 mcg into the lungs daily       triamcinolone (KENALOG) 0.1 % paste 2 times daily as needed For mouth lesion       diltiazem ER (TIAZAC) 240 MG 24 hr ER beaded capsule Take 1 capsule (240 mg) by mouth daily for 5 doses (Patient taking differently: Take 240 mg by mouth daily ) 5 capsule 0       ROS:  10 point ROS of systems including Constitutional, Eyes, Respiratory, Cardiovascular, Gastroenterology, Genitourinary, Integumentary, Musculoskeletal, Psychiatric were all negative except for pertinent positives noted in my HPI.    Vitals:  /73   Pulse 89   Temp 97.3  F (36.3  C)   Resp 17   Ht 1.524 m (5')   Wt  55.6 kg (122 lb 8 oz)   SpO2 99%   BMI 23.92 kg/m     Exam:  GENERAL APPEARANCE:  Alert, oriented, cooperative  EYES:  EOM, conjunctivae, lids, pupils and irises normal  RESP:  respiratory effort and palpation of chest normal, no respiratory distress, lungs sounds diminished, oxygen on at 3L/min  CV:  Palpation and auscultation of heart done , regular rate and rhythm, no murmur, rub, or gallop, no edema  ABDOMEN:  abdomen round and soft.  did not have pain to light touch.  sensitive to palpation in pit of stomach  M/S:   Gait and station abnormal assist of one with transfers from bed to w/c.  SKIN:  skin was pink, dry and warm to touch.   PSYCH:  oriented X 3, normal insight, judgement and memory, mood was pleasant but discouraged with being ill    Lab/Diagnostic data:  Component      Latest Ref Rng & Units 6/26/2019   Sodium      133 - 144 mmol/L 138   Potassium      3.4 - 5.3 mmol/L 4.1   Chloride      94 - 109 mmol/L 91 (L)   Carbon Dioxide      20 - 32 mmol/L 40 (H)   Anion Gap      3 - 14 mmol/L 7   Glucose      70 - 99 mg/dL 109 (H)   Urea Nitrogen      7 - 30 mg/dL 15   Creatinine      0.52 - 1.04 mg/dL 0.82   GFR Estimate      >60 mL/min/1.73:m2 67   GFR Estimate If Black      >60 mL/min/1.73:m2 78   Calcium      8.5 - 10.1 mg/dL 9.4   WBC      4.0 - 11.0 10e9/L 12.0 (H)     Component      Latest Ref Rng & Units 6/25/2019   WBC      4.0 - 11.0 10e9/L 13.3 (H)   RBC Count      3.8 - 5.2 10e12/L 3.23 (L)   Hemoglobin      11.7 - 15.7 g/dL 9.8 (L)   Hematocrit      35.0 - 47.0 % 30.7 (L)   MCV      78 - 100 fl 95   MCH      26.5 - 33.0 pg 30.3   MCHC      31.5 - 36.5 g/dL 31.9   RDW      10.0 - 15.0 % 13.2   Platelet Count      150 - 450 10e9/L 230   Diff Method       Automated Method   % Neutrophils      % 77.8   % Lymphocytes      % 10.5   % Monocytes      % 9.4   % Eosinophils      % 1.6   % Basophils      % 0.2   % Immature Granulocytes      % 0.5   Nucleated RBCs      0 /100 0   Absolute Neutrophil       1.6 - 8.3 10e9/L 10.3 (H)   Absolute Lymphocytes      0.8 - 5.3 10e9/L 1.4   Absolute Monocytes      0.0 - 1.3 10e9/L 1.3   Absolute Basophils      0.0 - 0.2 10e9/L 0.0   Abs Immature Granulocytes      0 - 0.4 10e9/L 0.1   Absolute Nucleated RBC       0.0   Sodium      133 - 144 mmol/L 135   Potassium      3.4 - 5.3 mmol/L 3.8   Chloride      94 - 109 mmol/L 88 (L)   Carbon Dioxide      20 - 32 mmol/L 44 (H)   Anion Gap      3 - 14 mmol/L 3   Glucose      70 - 99 mg/dL 117 (H)   Urea Nitrogen      7 - 30 mg/dL 14   Creatinine      0.52 - 1.04 mg/dL 0.87   GFR Estimate      >60 mL/min/1.73:m2 62   GFR Estimate If Black      >60 mL/min/1.73:m2 72   Calcium      8.5 - 10.1 mg/dL 9.8   Magnesium      1.6 - 2.3 mg/dL 1.6     ASSESSMENT/PLAN:  Generalized muscle weakness  Will be attending therapies.  They were in doing their assessment prior to this NP visiting with her.    Goal is a short term stay.  Call light in reach.  Encouraged her to ask for what she needs and not think she would be a burden.      Gastroesophageal reflux disease with esophagitis  Currently on omeprazole 20mg twice a day.    Will add Carafate 1GM before meals and at HS.  Will follow up later to see if effective or refer back to the GI doctor.  Will add a Mg and CBC to mondays labs as well    Slow transit constipation  Is on colace 200mg twice a day, Miralax 17gm daily and has Mag oxide 400mg twice a day.  Monitored every shift.    Chronic obstructive pulmonary disease, unspecified COPD type (H)  Malignant neoplasm of upper lobe of right lung (H)  On oxygen continuous.  advair 500-50twice a day, Flonase allergy relief daily, albuterol inhaler QID prn, spiriva 18mcg daily.    Discussed current status but goal is not the cancer during this stay.    Paroxysmal atrial fibrillation (H)  Remains now on Xarelto 15mg daily.  Monitor for bleeding or complaints of palpitations.      Essential hypertension  Blood pressures range so far 120-170's/60--80's.     Remains on lisinopril 20mg twice a day, hydralazine 25mg twice a day, metoprolol 50mg twice day and Tiazac 240mg daily.  Vitals daily.  Occasionally appears to have a elevated reading.  No consistent.     Generalized Pain  Daughter brought in some medications that Soledad uses for comfort and will give orders for.  1.  Discontinue the oxycodone  2.  Tylenol PM 1 tab at HS as directed by her GI MD.  Will help with pain in hips and relax her as well    Generalized anxiety disorder  Has Lorazepam at home and so will start this order as well.    1.  Lorazepam 0.5mg po twice a day prn for anxiety e/b restlessness or fidgety at night.      Orders written by provider at facility and transcribed by : Kalee Hart MA  1.  Add CBC & Mag level to Monday's labs.  Dx: lung cancer, COPD  2.  Carafate 1 gm po with meals and hs.  Dx: GERD  3.  Discontinue boost.  4.  Ensure 1 can BID.  Dx: poor appetite  5.  Discontinue Percocet.  6.  Tylenol PM 1 capsule po at bedtime.  Dx: sciatica/general pain  7.  Lorazepam 0.5 mg po BID PRN x 14 days.  Dx: anxiety, restlessness, fidgity at noc.      Total time spent with patient visit at the skilled nursing facility was 40 minutes including patient visit, review of past records and discussion with daughter. Greater than 50% of total time spent with counseling and coordinating care due to clarifying medications, collecting history and expalining the MD/NP model     Electronically signed by:  ELIZA Valadez CNP                         Sincerely,        ELIZA Valadez CNP

## 2019-06-27 NOTE — PROGRESS NOTES
[place] [facility]  PRIMARY CARE PROVIDER AND CLINIC:  LIZETTE DUBOIS, [facility] [address] / [place] [address]  Chief Complaint   Patient presents with     Hospital F/U     [facility] Medical Record Number:  [id number]  Place of Service where encounter took place:  [facility] [place] ([facility]) [[id number]]    Janice K Goodell  is a 83 year old  (1935), admitted to the above facility from  [facility]. Hospital stay 6/24/19 through 6/26/19..  Admitted to this facility for  rehab, medical management and nursing care.    HPI:    HPI information obtained from: facility chart records, facility staff, patient report, [facility] chart review and family/first contact daughter report.   Brief Summary of Hospital Course:   Master presented to [facility] due to weakness following a bout of constipation and diarrhea as coordinated with her GI doctor.  Hospital found her electrolytes to be off and so IV fluids help correct this but continued with weakness.  Therapies suggested that she come to a TCU for strengthening/mobility.  BMP is ordered for 7/1/19 for follow up.  Had some difficulty already swallowing pills at the hospital and if this continued then suggested outpatient evaluation of upper GI.  Updates on Status Since Skilled nursing Admission:   Today met with the daughter as she is advocating for something to help her mother with the pain in the pit of her stomach.  Daughter relates to this pain and feels it is symptoms of GERD.  Is already on omeprazole twice a day but could add Carafate to see if that would help as well.  Daughter concerned as well for her appetite.    Soledad is in bed with the HOB elevated.  Able to communicate her needs.  She talked about her hx with cancer.  Has been on oxygen for some years due to part of lung removed.  Main complaint is her stomach.  Just wants to get stronger in order to return back home.      CODE STATUS/ADVANCE  DIRECTIVES DISCUSSION:   CPR/Full code   Patient's living condition: lives with family, daughter for now   ALLERGIES: Sulfa drugs; Adhesive tape; Morphine sulfate; Amoxicillin; Ceftriaxone; Doxycycline; Levaquin; Zithromax [azithromycin dihydrate]; and Thiazide-type diuretics  PAST MEDICAL HISTORY:  has a past medical history of COPD (chronic obstructive pulmonary disease) (H), Heart valve disorder, Hypertension, Macular degeneration, Malignant neoplasm (H) (2006), and Plantar fasciitis.  PAST SURGICAL HISTORY:   has a past surgical history that includes cataract iol, rt/lt (6/26/2008); REMV CATARACT EXTRACAP,INSERT LENS (04/23/09); Right lung - lobectomy (2006); hernia repair; Cholecystectomy; appendectomy; Colorectal surgery; ENT surgery; biopsy (2006); colonoscopy; and Laser YAG capsulotomy (7/24/2014).  FAMILY HISTORY: family history is not on file.  SOCIAL HISTORY:   reports that she quit smoking about 17 years ago. She does not have any smokeless tobacco history on file. She reports that she does not drink alcohol or use drugs.    Post Discharge Medication Reconciliation Status: discharge medications reconciled and changed, per note/orders (see AVS)    Current Outpatient Medications   Medication Sig Dispense Refill     albuterol (PROAIR HFA/PROVENTIL HFA/VENTOLIN HFA) 108 (90 Base) MCG/ACT inhaler Inhale 2 puffs into the lungs 4 times daily as needed       BACTROBAN 2 % EX OINT in nose TID prn       cetirizine (ZYRTEC) 10 MG tablet Take 1 tablet by mouth 2 times daily as needed (itch, swelling of lips). 20 tablet 0     desonide (DESOWEN) 0.05 % external cream Apply topically as needed To nose lesion twice a day       diltiazem ER (TIAZAC) 240 MG 24 hr ER beaded capsule Take 240 mg by mouth daily       docusate sodium (STOOL SOFTENER) 100 MG capsule Take 2 capsules (200 mg) by mouth 2 times daily 120 capsule 0     fluticasone (FLONASE) 50 MCG/ACT spray Spray 1 spray in nostril daily       fluticasone-salmeterol  (ADVAIR DISKUS) 500-50 MCG/DOSE diskus inhaler Inhale 1 puff into the lungs 2 times daily       folic acid 800 MCG tablet Take 800 mcg by mouth daily       hydrALAZINE (APRESOLINE) 25 MG tablet Take 25 mg by mouth 2 times daily       lisinopril (PRINIVIL/ZESTRIL) 10 MG tablet Take 20 mg by mouth 2 times daily        magnesium oxide (MAG-OX) 400 MG tablet Take 1 tablet (400 mg) by mouth 2 times daily 60 tablet 0     metoprolol tartrate (LOPRESSOR) 50 MG tablet Take 1 tablet (50 mg) by mouth 2 times daily 60 tablet 0     MULTIPLE VITAMIN ESSENTIAL TABS Take 1 tablet by mouth daily       Multiple Vitamins-Minerals (OCUVITE PRESERVISION PO) Take 1 tablet by mouth 2 times daily       omeprazole (PRILOSEC) 20 MG CR capsule Take 20 mg by mouth 2 times daily       oxyCODONE-acetaminophen (PERCOCET) 5-325 MG tablet Take 1 tablet by mouth every 12 hours as needed for pain 12 tablet 0     polyethylene glycol (MIRALAX/GLYCOLAX) packet Take 17 g by mouth daily 30 packet 0     potassium chloride SA (K-DUR/KLOR-CON M) 10 MEQ CR tablet Take 10 mEq by mouth 2 times daily (with meals)       rivaroxaban ANTICOAGULANT (XARELTO) 15 MG TABS tablet Take 15 mg by mouth daily (with dinner)       tiotropium (SPIRIVA) 18 MCG capsule Inhale 18 mcg into the lungs daily       triamcinolone (KENALOG) 0.1 % paste 2 times daily as needed For mouth lesion       diltiazem ER (TIAZAC) 240 MG 24 hr ER beaded capsule Take 1 capsule (240 mg) by mouth daily for 5 doses (Patient taking differently: Take 240 mg by mouth daily ) 5 capsule 0       ROS:  10 point ROS of systems including Constitutional, Eyes, Respiratory, Cardiovascular, Gastroenterology, Genitourinary, Integumentary, Musculoskeletal, Psychiatric were all negative except for pertinent positives noted in my HPI.    Vitals:  /73   Pulse 89   Temp 97.3  F (36.3  C)   Resp 17   Ht 1.524 m (5')   Wt 55.6 kg (122 lb 8 oz)   SpO2 99%   BMI 23.92 kg/m    Exam:  GENERAL APPEARANCE:  Alert,  oriented, cooperative  EYES:  EOM, conjunctivae, lids, pupils and irises normal  RESP:  respiratory effort and palpation of chest normal, no respiratory distress, lungs sounds diminished, oxygen on at 3L/min  CV:  Palpation and auscultation of heart done , regular rate and rhythm, no murmur, rub, or gallop, no edema  ABDOMEN:  abdomen round and soft.  did not have pain to light touch.  sensitive to palpation in pit of stomach  M/S:   Gait and station abnormal assist of one with transfers from bed to w/c.  SKIN:  skin was pink, dry and warm to touch.   PSYCH:  oriented X 3, normal insight, judgement and memory, mood was pleasant but discouraged with being ill    Lab/Diagnostic data:  Component      Latest Ref Rng & Units 6/26/2019   Sodium      133 - 144 mmol/L 138   Potassium      3.4 - 5.3 mmol/L 4.1   Chloride      94 - 109 mmol/L 91 (L)   Carbon Dioxide      20 - 32 mmol/L 40 (H)   Anion Gap      3 - 14 mmol/L 7   Glucose      70 - 99 mg/dL 109 (H)   Urea Nitrogen      7 - 30 mg/dL 15   Creatinine      0.52 - 1.04 mg/dL 0.82   GFR Estimate      >60 mL/min/1.73:m2 67   GFR Estimate If Black      >60 mL/min/1.73:m2 78   Calcium      8.5 - 10.1 mg/dL 9.4   WBC      4.0 - 11.0 10e9/L 12.0 (H)     Component      Latest Ref Rng & Units 6/25/2019   WBC      4.0 - 11.0 10e9/L 13.3 (H)   RBC Count      3.8 - 5.2 10e12/L 3.23 (L)   Hemoglobin      11.7 - 15.7 g/dL 9.8 (L)   Hematocrit      35.0 - 47.0 % 30.7 (L)   MCV      78 - 100 fl 95   MCH      26.5 - 33.0 pg 30.3   MCHC      31.5 - 36.5 g/dL 31.9   RDW      10.0 - 15.0 % 13.2   Platelet Count      150 - 450 10e9/L 230   Diff Method       Automated Method   % Neutrophils      % 77.8   % Lymphocytes      % 10.5   % Monocytes      % 9.4   % Eosinophils      % 1.6   % Basophils      % 0.2   % Immature Granulocytes      % 0.5   Nucleated RBCs      0 /100 0   Absolute Neutrophil      1.6 - 8.3 10e9/L 10.3 (H)   Absolute Lymphocytes      0.8 - 5.3 10e9/L 1.4   Absolute  Monocytes      0.0 - 1.3 10e9/L 1.3   Absolute Basophils      0.0 - 0.2 10e9/L 0.0   Abs Immature Granulocytes      0 - 0.4 10e9/L 0.1   Absolute Nucleated RBC       0.0   Sodium      133 - 144 mmol/L 135   Potassium      3.4 - 5.3 mmol/L 3.8   Chloride      94 - 109 mmol/L 88 (L)   Carbon Dioxide      20 - 32 mmol/L 44 (H)   Anion Gap      3 - 14 mmol/L 3   Glucose      70 - 99 mg/dL 117 (H)   Urea Nitrogen      7 - 30 mg/dL 14   Creatinine      0.52 - 1.04 mg/dL 0.87   GFR Estimate      >60 mL/min/1.73:m2 62   GFR Estimate If Black      >60 mL/min/1.73:m2 72   Calcium      8.5 - 10.1 mg/dL 9.8   Magnesium      1.6 - 2.3 mg/dL 1.6     ASSESSMENT/PLAN:  Generalized muscle weakness  Will be attending therapies.  They were in doing their assessment prior to this NP visiting with her.    Goal is a short term stay.  Call light in reach.  Encouraged her to ask for what she needs and not think she would be a burden.      Gastroesophageal reflux disease with esophagitis  Currently on omeprazole 20mg twice a day.    Will add Carafate 1GM before meals and at HS.  Will follow up later to see if effective or refer back to the GI doctor.  Will add a Mg and CBC to mondays labs as well    Slow transit constipation  Is on colace 200mg twice a day, Miralax 17gm daily and has Mag oxide 400mg twice a day.  Monitored every shift.    Chronic obstructive pulmonary disease, unspecified COPD type (H)  Malignant neoplasm of upper lobe of right lung (H)  On oxygen continuous.  advair 500-50twice a day, Flonase allergy relief daily, albuterol inhaler QID prn, spiriva 18mcg daily.    Discussed current status but goal is not the cancer during this stay.    Paroxysmal atrial fibrillation (H)  Remains now on Xarelto 15mg daily.  Monitor for bleeding or complaints of palpitations.      Essential hypertension  Blood pressures range so far 120-170's/60--80's.    Remains on lisinopril 20mg twice a day, hydralazine 25mg twice a day, metoprolol 50mg  twice day and Tiazac 240mg daily.  Vitals daily.  Occasionally appears to have a elevated reading.  No consistent.     Generalized Pain  Daughter brought in some medications that Soledad uses for comfort and will give orders for.  1.  Discontinue the oxycodone  2.  Tylenol PM 1 tab at HS as directed by her GI MD.  Will help with pain in hips and relax her as well    Generalized anxiety disorder  Has Lorazepam at home and so will start this order as well.    1.  Lorazepam 0.5mg po twice a day prn for anxiety e/b restlessness or fidgety at night.      Orders written by provider at facility and transcribed by : Kalee Hart MA  1.  Add CBC & Mag level to Monday's labs.  Dx: lung cancer, COPD  2.  Carafate 1 gm po with meals and hs.  Dx: GERD  3.  Discontinue boost.  4.  Ensure 1 can BID.  Dx: poor appetite  5.  Discontinue Percocet.  6.  Tylenol PM 1 capsule po at bedtime.  Dx: sciatica/general pain  7.  Lorazepam 0.5 mg po BID PRN x 14 days.  Dx: anxiety, restlessness, fidgity at noc.      Total time spent with patient visit at the [facility] nursing facility was 40 minutes including patient visit, review of past records and discussion with daughter. Greater than 50% of total time spent with counseling and coordinating care due to clarifying medications, collecting history and expalining the MD/NP model     Electronically signed by:  ELIZA Valadez CNP

## 2019-06-28 ENCOUNTER — DOCUMENTATION ONLY (OUTPATIENT)
Dept: OTHER | Facility: CLINIC | Age: 84
End: 2019-06-28

## 2019-06-28 RX ORDER — LORAZEPAM 0.5 MG/1
0.5 TABLET ORAL 2 TIMES DAILY PRN
COMMUNITY

## 2019-06-28 RX ORDER — SUCRALFATE 1 G/1
1 TABLET ORAL
COMMUNITY

## 2019-06-29 PROBLEM — I48.0 PAROXYSMAL ATRIAL FIBRILLATION (H): Status: ACTIVE | Noted: 2019-06-29

## 2019-06-29 PROBLEM — R52 GENERALIZED PAIN: Status: ACTIVE | Noted: 2019-06-29

## 2019-06-29 PROBLEM — F41.1 GENERALIZED ANXIETY DISORDER: Status: ACTIVE | Noted: 2019-06-29

## 2019-07-01 ENCOUNTER — NURSING HOME VISIT (OUTPATIENT)
Dept: GERIATRICS | Facility: CLINIC | Age: 84
End: 2019-07-01
Payer: COMMERCIAL

## 2019-07-01 ENCOUNTER — HOSPITAL LABORATORY (OUTPATIENT)
Dept: NURSING HOME | Facility: OTHER | Age: 84
End: 2019-07-01

## 2019-07-01 VITALS
HEIGHT: 60 IN | HEART RATE: 105 BPM | DIASTOLIC BLOOD PRESSURE: 62 MMHG | TEMPERATURE: 97.1 F | BODY MASS INDEX: 23.16 KG/M2 | RESPIRATION RATE: 20 BRPM | SYSTOLIC BLOOD PRESSURE: 132 MMHG | WEIGHT: 118 LBS | OXYGEN SATURATION: 95 %

## 2019-07-01 DIAGNOSIS — R63.4 LOSS OF WEIGHT: ICD-10-CM

## 2019-07-01 DIAGNOSIS — R13.19 ESOPHAGEAL DYSPHAGIA: Primary | ICD-10-CM

## 2019-07-01 DIAGNOSIS — D64.9 ANEMIA, UNSPECIFIED TYPE: ICD-10-CM

## 2019-07-01 LAB
ANION GAP SERPL CALCULATED.3IONS-SCNC: 4 MMOL/L (ref 3–14)
BUN SERPL-MCNC: 38 MG/DL (ref 7–30)
CALCIUM SERPL-MCNC: 9.9 MG/DL (ref 8.5–10.1)
CHLORIDE SERPL-SCNC: 94 MMOL/L (ref 94–109)
CO2 SERPL-SCNC: 40 MMOL/L (ref 20–32)
CREAT SERPL-MCNC: 0.96 MG/DL (ref 0.52–1.04)
ERYTHROCYTE [DISTWIDTH] IN BLOOD BY AUTOMATED COUNT: 13.8 % (ref 10–15)
GFR SERPL CREATININE-BSD FRML MDRD: 55 ML/MIN/{1.73_M2}
GLUCOSE SERPL-MCNC: 101 MG/DL (ref 70–99)
HCT VFR BLD AUTO: 26.9 % (ref 35–47)
HGB BLD-MCNC: 8.3 G/DL (ref 11.7–15.7)
MAGNESIUM SERPL-MCNC: 2.5 MG/DL (ref 1.6–2.3)
MCH RBC QN AUTO: 30.3 PG (ref 26.5–33)
MCHC RBC AUTO-ENTMCNC: 30.9 G/DL (ref 31.5–36.5)
MCV RBC AUTO: 98 FL (ref 78–100)
PLATELET # BLD AUTO: 258 10E9/L (ref 150–450)
POTASSIUM SERPL-SCNC: 4.3 MMOL/L (ref 3.4–5.3)
RBC # BLD AUTO: 2.74 10E12/L (ref 3.8–5.2)
SODIUM SERPL-SCNC: 138 MMOL/L (ref 133–144)
WBC # BLD AUTO: 12.7 10E9/L (ref 4–11)

## 2019-07-01 PROCEDURE — 99309 SBSQ NF CARE MODERATE MDM 30: CPT | Performed by: NURSE PRACTITIONER

## 2019-07-01 RX ORDER — PROCHLORPERAZINE MALEATE 10 MG
10 TABLET ORAL EVERY 6 HOURS PRN
COMMUNITY

## 2019-07-01 RX ORDER — METOPROLOL TARTRATE 100 MG
100 TABLET ORAL 2 TIMES DAILY
COMMUNITY

## 2019-07-01 RX ORDER — FUROSEMIDE 40 MG
40 TABLET ORAL DAILY
COMMUNITY

## 2019-07-01 RX ORDER — CALCIUM CARBONATE/VITAMIN D3 500-10/5ML
400 LIQUID (ML) ORAL DAILY
COMMUNITY

## 2019-07-01 ASSESSMENT — MIFFLIN-ST. JEOR: SCORE: 921.74

## 2019-07-01 NOTE — LETTER
7/1/2019        RE: Janice K Goodell  [address]  [facility] [address]        [place] [facility]  [place] Medical Record Number:  [id number]  Place of Service where encounter took place:  [facility] ([facility]) [[id number]]  Chief Complaint   Patient presents with     Nursing Home Acute       HPI:    Janice K Goodell  is a 83 year old (1935), who is being seen today for an episodic care visit.  HPI information obtained from: facility chart records, facility staff, patient report and family/first contact daughter report. Today's concern is:    1. Esophageal dysphagia    2. Anemia, unspecified type    3. Loss of weight      - Master's daughter approached this NP today asking for help due to not being able to get her mother in for a upper endoscopy with her GI doctor out of [place].  Master continues to have trouble swallowing her pills or even a sip of water as witnessed by this NP.  She winces with the sip of water.  Did not gag but seemed painful for her to swallow.    This NP had started Carafate late last week and do not feel that has made a difference.    Master thought she weighed 118 lbs today and daughter stated that was another 4 pounds down.      Past Medical and Surgical History reviewed in Epic today.    MEDICATIONS:  Current Outpatient Medications   Medication Sig Dispense Refill     albuterol (PROAIR HFA/PROVENTIL HFA/VENTOLIN HFA) 108 (90 Base) MCG/ACT inhaler Inhale 2 puffs into the lungs 4 times daily as needed       BACTROBAN 2 % EX OINT in nose TID prn       cetirizine (ZYRTEC) 10 MG tablet Take 1 tablet by mouth 2 times daily as needed (itch, swelling of lips). 20 tablet 0     desonide (DESOWEN) 0.05 % external cream Apply topically as needed To nose lesion twice a day       diltiazem ER (TIAZAC) 240 MG 24 hr ER beaded capsule Take 240 mg by mouth daily       diphenhydrAMINE-acetaminophen (TYLENOL PM)  MG tablet Take 2 tablets by mouth At Bedtime         docusate sodium (STOOL SOFTENER) 100 MG capsule Take 2 capsules (200 mg) by mouth 2 times daily 120 capsule 0     fluticasone (FLONASE) 50 MCG/ACT spray Spray 1 spray in nostril daily       fluticasone-salmeterol (ADVAIR DISKUS) 500-50 MCG/DOSE diskus inhaler Inhale 1 puff into the lungs 2 times daily       folic acid 800 MCG tablet Take 800 mcg by mouth daily       furosemide (LASIX) 40 MG tablet Take 40 mg by mouth daily       hydrALAZINE (APRESOLINE) 25 MG tablet Take 25 mg by mouth 2 times daily       lisinopril (PRINIVIL/ZESTRIL) 10 MG tablet Take 20 mg by mouth 2 times daily        LORazepam (ATIVAN) 0.5 MG tablet Take 0.5 mg by mouth 2 times daily as needed for anxiety       magnesium oxide (MAG-OX) 400 MG tablet Take 1 tablet (400 mg) by mouth 2 times daily 60 tablet 0     magnesium oxide 400 MG CAPS Take 400 mg by mouth daily       metoprolol tartrate (LOPRESSOR) 100 MG tablet Take 100 mg by mouth 2 times daily       MULTIPLE VITAMIN ESSENTIAL TABS Take 1 tablet by mouth daily       Multiple Vitamins-Minerals (OCUVITE PRESERVISION PO) Take 1 tablet by mouth 2 times daily       Nutritional Supplements (ENSURE COMPLETE) LIQD Take 1 Can by mouth 2 times daily       omeprazole (PRILOSEC) 20 MG CR capsule Take 20 mg by mouth 2 times daily       polyethylene glycol (MIRALAX/GLYCOLAX) packet Take 17 g by mouth daily 30 packet 0     potassium chloride SA (K-DUR/KLOR-CON M) 10 MEQ CR tablet Take 10 mEq by mouth 2 times daily (with meals)       prochlorperazine (COMPAZINE) 10 MG tablet Take 10 mg by mouth every 6 hours as needed for nausea or vomiting       rivaroxaban ANTICOAGULANT (XARELTO) 15 MG TABS tablet Take 15 mg by mouth daily (with dinner)       sucralfate (CARAFATE) 1 GM tablet Take 1 g by mouth 4 times daily (with meals and nightly)       tiotropium (SPIRIVA) 18 MCG capsule Inhale 18 mcg into the lungs daily       triamcinolone (KENALOG) 0.1 % paste 2 times daily as needed For mouth lesion       diltiazem  ER (TIAZAC) 240 MG 24 hr ER beaded capsule Take 1 capsule (240 mg) by mouth daily for 5 doses (Patient taking differently: Take 240 mg by mouth daily ) 5 capsule 0     metoprolol tartrate (LOPRESSOR) 50 MG tablet Take 1 tablet (50 mg) by mouth 2 times daily 60 tablet 0       REVIEW OF SYSTEMS:  4 point ROS including Respiratory, CV, GI and , other than that noted in the HPI,  is negative    Objective:  /62   Pulse 105   Temp 97.1  F (36.2  C)   Resp 20   Ht 1.524 m (5')   Wt 53.5 kg (118 lb)   SpO2 95%   BMI 23.05 kg/m     Exam:  Went over to where the family was sitting and Master was up in the w/c.  Appeared tired.  Alert and oriented x3  Skin is pink dry and warm.  Radial pulse is regular and strong.    Winced when she took a sip from the straw in her water.    Weight on 6/27 was 122 lbs  And today 118 lbs    Blood pressures range from 130-150's/60-70's    Labs:   Component      Latest Ref Rng & Units 7/1/2019   Sodium      133 - 144 mmol/L 138   Potassium      3.4 - 5.3 mmol/L 4.3   Chloride      94 - 109 mmol/L 94   Carbon Dioxide      20 - 32 mmol/L 40 (H)   Anion Gap      3 - 14 mmol/L 4   Glucose      70 - 99 mg/dL 101 (H)   Urea Nitrogen      7 - 30 mg/dL 38 (H)   Creatinine      0.52 - 1.04 mg/dL 0.96   GFR Estimate      >60 mL/min/1.73:m2 55 (L)   GFR Estimate If Black      >60 mL/min/1.73:m2 64   Calcium      8.5 - 10.1 mg/dL 9.9   WBC      4.0 - 11.0 10e9/L 12.7 (H)   RBC Count      3.8 - 5.2 10e12/L 2.74 (L)   Hemoglobin      11.7 - 15.7 g/dL 8.3 (L)   Hematocrit      35.0 - 47.0 % 26.9 (L)   MCV      78 - 100 fl 98   MCH      26.5 - 33.0 pg 30.3   MCHC      31.5 - 36.5 g/dL 30.9 (L)   RDW      10.0 - 15.0 % 13.8   Platelet Count      150 - 450 10e9/L 258   Magnesium      1.6 - 2.3 mg/dL 2.5 (H)       ASSESSMENT/PLAN:  1. Esophageal dysphagia    2. Anemia, unspecified type    3. Loss of weight      - this NP placed a call to the GI clinic's nurse coordinator and left a message.  She did  call this NP back and gave a suggestion but decision was already made on what was going to occur.  This NP also called [facility] GI clinic and suggestion was made to call the  hospital rounding MD to talk with that person to see if they would accept her today since this seems urgent.  In that case it would be wise to send her back to the hospital that already as her records then sending her to a whole new system and explaining what is happening.    Met with the family and Master, decision made that they will take her to [facility] this afternoon.  Staff will have paperwork ready and will call to give a summary for the ED.      Orders written by provider at facility and transcribed by : Kalee Hart MA  1.  Decrease Magnesium to 1 x every day.  Dx: hypomagnesia  2.  Send to [facility] ED for GI evaluation, decreased Hgb, trouble swallowing, weight loss      Electronically signed by:  ELIZA Valadez CNP               Sincerely,        ELIZA Valadez CNP

## 2019-07-01 NOTE — PROGRESS NOTES
[facility]  [facility] Record Number:  [id number]  Place of Service where encounter took place:  [facility] ([facility] [[id number]]  Chief Complaint   Patient presents with     Nursing Home Acute       HPI:    Janice K Goodell  is a 83 year old (1935), who is being seen today for an episodic care visit.  HPI information obtained from: facility chart records, facility staff, patient report and family/first contact daughter report. Today's concern is:    1. Esophageal dysphagia    2. Anemia, unspecified type    3. Loss of weight      - Master's daughter approached this NP today asking for help due to not being able to get her mother in for a upper endoscopy with her GI doctor out of [place].  Master continues to have trouble swallowing her pills or even a sip of water as witnessed by this NP.  She winces with the sip of water.  Did not gag but seemed painful for her to swallow.    This NP had started Carafate late last week and do not feel that has made a difference.    Master thought she weighed 118 lbs today and daughter stated that was another 4 pounds down.      Past Medical and Surgical History reviewed in Epic today.    MEDICATIONS:  Current Outpatient Medications   Medication Sig Dispense Refill     albuterol (PROAIR HFA/PROVENTIL HFA/VENTOLIN HFA) 108 (90 Base) MCG/ACT inhaler Inhale 2 puffs into the lungs 4 times daily as needed       BACTROBAN 2 % EX OINT in nose TID prn       cetirizine (ZYRTEC) 10 MG tablet Take 1 tablet by mouth 2 times daily as needed (itch, swelling of lips). 20 tablet 0     desonide (DESOWEN) 0.05 % external cream Apply topically as needed To nose lesion twice a day       diltiazem ER (TIAZAC) 240 MG 24 hr ER beaded capsule Take 240 mg by mouth daily       diphenhydrAMINE-acetaminophen (TYLENOL PM)  MG tablet Take 2 tablets by mouth At Bedtime        docusate sodium (STOOL SOFTENER) 100 MG capsule Take 2 capsules (200 mg) by mouth 2 times  daily 120 capsule 0     fluticasone (FLONASE) 50 MCG/ACT spray Spray 1 spray in nostril daily       fluticasone-salmeterol (ADVAIR DISKUS) 500-50 MCG/DOSE diskus inhaler Inhale 1 puff into the lungs 2 times daily       folic acid 800 MCG tablet Take 800 mcg by mouth daily       furosemide (LASIX) 40 MG tablet Take 40 mg by mouth daily       hydrALAZINE (APRESOLINE) 25 MG tablet Take 25 mg by mouth 2 times daily       lisinopril (PRINIVIL/ZESTRIL) 10 MG tablet Take 20 mg by mouth 2 times daily        LORazepam (ATIVAN) 0.5 MG tablet Take 0.5 mg by mouth 2 times daily as needed for anxiety       magnesium oxide (MAG-OX) 400 MG tablet Take 1 tablet (400 mg) by mouth 2 times daily 60 tablet 0     magnesium oxide 400 MG CAPS Take 400 mg by mouth daily       metoprolol tartrate (LOPRESSOR) 100 MG tablet Take 100 mg by mouth 2 times daily       MULTIPLE VITAMIN ESSENTIAL TABS Take 1 tablet by mouth daily       Multiple Vitamins-Minerals (OCUVITE PRESERVISION PO) Take 1 tablet by mouth 2 times daily       Nutritional Supplements (ENSURE COMPLETE) LIQD Take 1 Can by mouth 2 times daily       omeprazole (PRILOSEC) 20 MG CR capsule Take 20 mg by mouth 2 times daily       polyethylene glycol (MIRALAX/GLYCOLAX) packet Take 17 g by mouth daily 30 packet 0     potassium chloride SA (K-DUR/KLOR-CON M) 10 MEQ CR tablet Take 10 mEq by mouth 2 times daily (with meals)       prochlorperazine (COMPAZINE) 10 MG tablet Take 10 mg by mouth every 6 hours as needed for nausea or vomiting       rivaroxaban ANTICOAGULANT (XARELTO) 15 MG TABS tablet Take 15 mg by mouth daily (with dinner)       sucralfate (CARAFATE) 1 GM tablet Take 1 g by mouth 4 times daily (with meals and nightly)       tiotropium (SPIRIVA) 18 MCG capsule Inhale 18 mcg into the lungs daily       triamcinolone (KENALOG) 0.1 % paste 2 times daily as needed For mouth lesion       diltiazem ER (TIAZAC) 240 MG 24 hr ER beaded capsule Take 1 capsule (240 mg) by mouth daily for 5  doses (Patient taking differently: Take 240 mg by mouth daily ) 5 capsule 0     metoprolol tartrate (LOPRESSOR) 50 MG tablet Take 1 tablet (50 mg) by mouth 2 times daily 60 tablet 0       REVIEW OF SYSTEMS:  4 point ROS including Respiratory, CV, GI and , other than that noted in the HPI,  is negative    Objective:  /62   Pulse 105   Temp 97.1  F (36.2  C)   Resp 20   Ht 1.524 m (5')   Wt 53.5 kg (118 lb)   SpO2 95%   BMI 23.05 kg/m    Exam:  Went over to where the family was sitting and Master was up in the w/c.  Appeared tired.  Alert and oriented x3  Skin is pink dry and warm.  Radial pulse is regular and strong.    Winced when she took a sip from the straw in her water.    Weight on 6/27 was 122 lbs  And today 118 lbs    Blood pressures range from 130-150's/60-70's    Labs:   Component      Latest Ref Rng & Units 7/1/2019   Sodium      133 - 144 mmol/L 138   Potassium      3.4 - 5.3 mmol/L 4.3   Chloride      94 - 109 mmol/L 94   Carbon Dioxide      20 - 32 mmol/L 40 (H)   Anion Gap      3 - 14 mmol/L 4   Glucose      70 - 99 mg/dL 101 (H)   Urea Nitrogen      7 - 30 mg/dL 38 (H)   Creatinine      0.52 - 1.04 mg/dL 0.96   GFR Estimate      >60 mL/min/1.73:m2 55 (L)   GFR Estimate If Black      >60 mL/min/1.73:m2 64   Calcium      8.5 - 10.1 mg/dL 9.9   WBC      4.0 - 11.0 10e9/L 12.7 (H)   RBC Count      3.8 - 5.2 10e12/L 2.74 (L)   Hemoglobin      11.7 - 15.7 g/dL 8.3 (L)   Hematocrit      35.0 - 47.0 % 26.9 (L)   MCV      78 - 100 fl 98   MCH      26.5 - 33.0 pg 30.3   MCHC      31.5 - 36.5 g/dL 30.9 (L)   RDW      10.0 - 15.0 % 13.8   Platelet Count      150 - 450 10e9/L 258   Magnesium      1.6 - 2.3 mg/dL 2.5 (H)       ASSESSMENT/PLAN:  1. Esophageal dysphagia    2. Anemia, unspecified type    3. Loss of weight      - this NP placed a call to the GI clinic's nurse coordinator and left a message.  She did call this NP back and gave a suggestion but decision was already made on what was going to  occur.  This NP also called [facility] clinic and suggestion was made to call the  hospital rounding MD to talk with that person to see if they would accept her today since this seems urgent.  In that case it would be wise to send her back to the hospital that already as her records then sending her to a whole new system and explaining what is happening.    Met with the family and Master, decision made that they will take her to [facility] this afternoon.  Staff will have paperwork ready and will call to give a summary for the ED.      Orders written by provider at facility and transcribed by : Kalee Hart MA  1.  Decrease Magnesium to 1 x every day.  Dx: hypomagnesia  2.  Send to [facility] ED for GI evaluation, decreased Hgb, trouble swallowing, weight loss      Electronically signed by:  ELIZA Valadez CNP

## 2019-07-13 ENCOUNTER — MEDICAL CORRESPONDENCE (OUTPATIENT)
Dept: HEALTH INFORMATION MANAGEMENT | Facility: CLINIC | Age: 84
End: 2019-07-13

## 2022-06-15 NOTE — DISCHARGE INSTRUCTIONS
Stop the atenolol and switch to metoprolol 50 mg twice a day instead.    I sent your prescription to [facility] pharmacy in [place].    We sent 1 dose home with you that you can take in the morning.    Take a full aspirin daily and keep your appointment with cardiology on Tuesday as scheduled.  Please return to the ED if you worsen or have any concerns.  It was a pleasure visiting with both of you tonight.  I am glad you are feeling better and hope you continue to improve.  I wish you the very best going forward.    Thank you for choosing [facility]. We appreciate the opportunity to meet your urgent medical needs. Please let us know if we could have done anything to make your stay more satisfying.    After discharge, please closely monitor for any new or worsening symptoms. Return to the Emergency Department if you develop any acute worsening signs or symptoms.    If you had lab work, cultures or imaging studies done during your stay, the final results may still be pending. We will call you if your plan of care needs to change. However, if you are not improving as expected, please follow up with your primary care provider or clinic.     Start any prescription medications that were prescribed to you and take them as directed.     Please see additional handouts that may be pertinent to your condition.      
(2) well flexed